# Patient Record
Sex: MALE | Race: WHITE | Employment: OTHER | URBAN - METROPOLITAN AREA
[De-identification: names, ages, dates, MRNs, and addresses within clinical notes are randomized per-mention and may not be internally consistent; named-entity substitution may affect disease eponyms.]

---

## 2017-01-27 ENCOUNTER — OFFICE VISIT (OUTPATIENT)
Dept: FAMILY MEDICINE | Facility: CLINIC | Age: 62
End: 2017-01-27
Payer: COMMERCIAL

## 2017-01-27 ENCOUNTER — TELEPHONE (OUTPATIENT)
Dept: LAB | Facility: CLINIC | Age: 62
End: 2017-01-27

## 2017-01-27 VITALS
HEART RATE: 75 BPM | DIASTOLIC BLOOD PRESSURE: 76 MMHG | SYSTOLIC BLOOD PRESSURE: 134 MMHG | HEIGHT: 72 IN | WEIGHT: 261 LBS | TEMPERATURE: 96.7 F | OXYGEN SATURATION: 96 % | BODY MASS INDEX: 35.35 KG/M2

## 2017-01-27 DIAGNOSIS — E78.5 HYPERLIPIDEMIA LDL GOAL <100: ICD-10-CM

## 2017-01-27 DIAGNOSIS — I10 HYPERTENSION GOAL BP (BLOOD PRESSURE) < 140/90: ICD-10-CM

## 2017-01-27 DIAGNOSIS — Z23 PNEUMOCOCCAL VACCINATION ADMINISTERED AT CURRENT VISIT: ICD-10-CM

## 2017-01-27 DIAGNOSIS — R11.0 NAUSEA: ICD-10-CM

## 2017-01-27 DIAGNOSIS — E11.42 CONTROLLED TYPE 2 DIABETES MELLITUS WITH DIABETIC POLYNEUROPATHY, WITHOUT LONG-TERM CURRENT USE OF INSULIN (H): Primary | ICD-10-CM

## 2017-01-27 LAB
ANION GAP SERPL CALCULATED.3IONS-SCNC: 8 MMOL/L (ref 3–14)
BUN SERPL-MCNC: 17 MG/DL (ref 7–30)
CALCIUM SERPL-MCNC: 9.1 MG/DL (ref 8.5–10.1)
CHLORIDE SERPL-SCNC: 104 MMOL/L (ref 94–109)
CO2 SERPL-SCNC: 25 MMOL/L (ref 20–32)
CREAT SERPL-MCNC: 0.82 MG/DL (ref 0.66–1.25)
GFR SERPL CREATININE-BSD FRML MDRD: ABNORMAL ML/MIN/1.7M2
GLUCOSE SERPL-MCNC: 163 MG/DL (ref 70–99)
HBA1C MFR BLD: 7.1 % (ref 4.3–6)
POTASSIUM SERPL-SCNC: 4.3 MMOL/L (ref 3.4–5.3)
SODIUM SERPL-SCNC: 137 MMOL/L (ref 133–144)

## 2017-01-27 PROCEDURE — 83036 HEMOGLOBIN GLYCOSYLATED A1C: CPT | Performed by: INTERNAL MEDICINE

## 2017-01-27 PROCEDURE — 99207 C FOOT EXAM  NO CHARGE: CPT | Performed by: INTERNAL MEDICINE

## 2017-01-27 PROCEDURE — 90670 PCV13 VACCINE IM: CPT | Performed by: INTERNAL MEDICINE

## 2017-01-27 PROCEDURE — 90471 IMMUNIZATION ADMIN: CPT | Performed by: INTERNAL MEDICINE

## 2017-01-27 PROCEDURE — 80048 BASIC METABOLIC PNL TOTAL CA: CPT | Performed by: INTERNAL MEDICINE

## 2017-01-27 PROCEDURE — 99214 OFFICE O/P EST MOD 30 MIN: CPT | Mod: 25 | Performed by: INTERNAL MEDICINE

## 2017-01-27 PROCEDURE — 36415 COLL VENOUS BLD VENIPUNCTURE: CPT | Performed by: INTERNAL MEDICINE

## 2017-01-27 RX ORDER — SIMVASTATIN 20 MG
20 TABLET ORAL DAILY
Qty: 90 TABLET | Refills: 3 | Status: SHIPPED | OUTPATIENT
Start: 2017-01-27 | End: 2017-02-06

## 2017-01-27 RX ORDER — GLIMEPIRIDE 2 MG/1
TABLET ORAL
Qty: 90 TABLET | Refills: 1 | Status: SHIPPED | OUTPATIENT
Start: 2017-01-27 | End: 2017-02-06

## 2017-01-27 RX ORDER — LISINOPRIL 5 MG/1
5 TABLET ORAL DAILY
Qty: 90 TABLET | Refills: 3 | Status: SHIPPED | OUTPATIENT
Start: 2017-01-27 | End: 2017-02-06
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
## 2017-01-27 NOTE — NURSING NOTE
Chief Complaint   Patient presents with     Diabetes       Initial /76 mmHg  Pulse 75  Temp(Src) 96.7  F (35.9  C) (Oral)  Ht 6' (1.829 m)  Wt 261 lb (118.389 kg)  BMI 35.39 kg/m2  SpO2 96% Estimated body mass index is 35.39 kg/(m^2) as calculated from the following:    Height as of this encounter: 6' (1.829 m).    Weight as of this encounter: 261 lb (118.389 kg).  BP completed using cuff size: cody Olson

## 2017-01-27 NOTE — Clinical Note
[facility] [address] [place] [address]    January 30, 2017    Florian Lawrence  [address] [facility] [id number]          Dear Florian,    These numbers are fine Mr. Lawrence, things look fine. Please let me know if you have any questions for me about all of these lab tests, take care.    Results for orders placed or performed in visit on 01/27/17   Hemoglobin A1c   Result Value Ref Range    Hemoglobin A1C 7.1 (H) 4.3 - 6.0 %   Basic metabolic panel   Result Value Ref Range    Sodium 137 133 - 144 mmol/L    Potassium 4.3 3.4 - 5.3 mmol/L    Chloride 104 94 - 109 mmol/L    Carbon Dioxide 25 20 - 32 mmol/L    Anion Gap 8 3 - 14 mmol/L    Glucose 163 (H) 70 - 99 mg/dL    Urea Nitrogen 17 7 - 30 mg/dL    Creatinine 0.82 0.66 - 1.25 mg/dL    GFR Estimate >90  Non  GFR Calc   >60 mL/min/1.7m2    GFR Estimate If Black >90   GFR Calc   >60 mL/min/1.7m2    Calcium 9.1 8.5 - 10.1 mg/dL       If you have any questions or concerns, please me or my clinic team at [phone].      Sincerely,        Cecilio Pro MD/bt

## 2017-01-27 NOTE — TELEPHONE ENCOUNTER
Dr Pro,     The patient did not leave a urine specimen. The Albumin Random Urine was put in as a future.   If the test is needed the patient will need to be contacted.    Thank you   Lab Staff

## 2017-01-27 NOTE — PATIENT INSTRUCTIONS
[facility]    If you have any questions regarding to your visit please contact your care team:     Team Pink:   Clinic Hours Telephone Number   Internal Medicine:  Dr. Melissa Honeycutt NP       7am-7pm  Monday - Thursday   7am-5pm  Fridays  [phone]  (Appointment scheduling available 24/7)    Questions about your visit?  Team Line  [phone]   Urgent Care - [place] and [facility] - 11am-9pm Monday-Friday Saturday-Sunday- 9am-5pm   [place] - 5pm-9pm Monday-Friday Saturday-Sunday- 9am-5pm  [phone] - [place]   [phone] - [place]       What options do I have for visits at the clinic other than the traditional office visit?  To expand how we care for you, many of our providers are utilizing electronic visits (e-visits) and telephone visits, when medically appropriate, for interactions with their patients rather than a visit in the clinic.   We also offer nurse visits for many medical concerns. Just like any other service, we will bill your insurance company for this type of visit based on time spent on the phone with your provider. Not all insurance companies cover these visits. Please check with your medical insurance if this type of visit is covered. You will be responsible for any charges that are not paid by your insurance.      E-visits via Vidyard:  generally incur a $35.00 fee.  Telephone visits:  Time spent on the phone: *charged based on time that is spent on the phone in increments of 10 minutes. Estimated cost:   5-10 mins $30.00   11-20 mins. $59.00   21-30 mins. $85.00   Use RoboteXt (secure email communication and access to your chart) to send your primary care provider a message or make an appointment. Ask someone on your Team how to sign up for Vidyard.    For a Price Quote for your services, please call our Consumer Price Line at [phone].    As always, Thank you for trusting us with your health care needs!  Nick  BENEDICT Olson

## 2017-01-27 NOTE — MR AVS SNAPSHOT
After Visit Summary   1/27/2017    Florian Lawrence    MRN: [id number]           Patient Information     Date Of Birth          1955        Visit Information        Provider Department      1/27/2017 8:50 AM Cecilio Pro MD [facility]        Today's Diagnoses     Controlled type 2 diabetes mellitus with diabetic polyneuropathy, without long-term current use of insulin (H)    -  1     Hyperlipidemia LDL goal <100         Hypertension goal BP (blood pressure) < 140/90         Nausea         Pneumococcal vaccination administered at current visit           Care Instructions    [facility]    If you have any questions regarding to your visit please contact your care team:     Team Pink:   Clinic Hours Telephone Number   Internal Medicine:  Dr. Melissa Honeycutt NP       7am-7pm  Monday - Thursday   7am-5pm  Fridays  [phone]  (Appointment scheduling available 24/7)    Questions about your visit?  Team Line  [phone]   Urgent Care - [place] - 11am-9pm Monday-Friday Saturday-Sunday- 9am-5pm   [place] - 5pm-9pm Monday-Friday Saturday-Sunday- 9am-5pm  [phone] - [place]   [phone] - [place]       What options do I have for visits at the clinic other than the traditional office visit?  To expand how we care for you, many of our providers are utilizing electronic visits (e-visits) and telephone visits, when medically appropriate, for interactions with their patients rather than a visit in the clinic.   We also offer nurse visits for many medical concerns. Just like any other service, we will bill your insurance company for this type of visit based on time spent on the phone with your provider. Not all insurance companies cover these visits. Please check with your medical insurance if this type of visit is covered. You will be responsible for any charges that are not paid by your insurance.       E-visits via [email or web address]:  generally incur a $35.00 fee.  Telephone visits:  Time spent on the phone: *charged based on time that is spent on the phone in increments of 10 minutes. Estimated cost:   5-10 mins $30.00   11-20 mins. $59.00   21-30 mins. $85.00   Use [email or web address] (secure email communication and access to your chart) to send your primary care provider a message or make an appointment. Ask someone on your Team how to sign up for PerspecSyst.    For a Price Quote for your services, please call our [employer] Line at [phone].    As always, Thank you for trusting us with your health care needs!  Nick MCMULLEN FLyestefanitad          Follow-ups after your visit        Who to contact     If you have questions or need follow up information about today's clinic visit or your schedule please contact [facility] directly at [phone].  Normal or non-critical lab and imaging results will be communicated to you by [email or web address], letter or phone within 4 business days after the clinic has received the results. If you do not hear from us within 7 days, please contact the clinic through PerspecSyst or phone. If you have a critical or abnormal lab result, we will notify you by phone as soon as possible.  Submit refill requests through BooRah or call your pharmacy and they will forward the refill request to us. Please allow 3 business days for your refill to be completed.          Additional Information About Your Visit        [email or web address] Information     BooRah gives you secure access to your electronic health record. If you see a primary care provider, you can also send messages to your care team and make appointments. If you have questions, please call your primary care clinic.  If you do not have a primary care provider, please call [phone] and they will assist you.        Care EveryWhere ID     This is your Care EveryWhere ID. This could be used by other organizations to access your [facility]  records  [id number]        Your Vitals Were     Pulse Temperature Height BMI (Body Mass Index) Pulse Oximetry       75 96.7  F (35.9  C) (Oral) 6' (1.829 m) 35.39 kg/m2 96%        Blood Pressure from Last 3 Encounters:   01/27/17 134/76   11/21/16 148/80   06/27/16 132/74    Weight from Last 3 Encounters:   01/27/17 261 lb (118.389 kg)   11/21/16 259 lb (117.482 kg)   06/27/16 259 lb (117.482 kg)              We Performed the Following     Albumin Random Urine Quantitative     Basic metabolic panel     FOOT EXAM     Hemoglobin A1c     PNEUMOCOCCAL CONJ VACCINE 13 VALENT IM (PREVNAR 13)          Today's Medication Changes          These changes are accurate as of: 1/27/17  9:51 AM.  If you have any questions, ask your nurse or doctor.               These medicines have changed or have updated prescriptions.        Dose/Directions    glimepiride 2 MG tablet   Commonly known as:  AMARYL   This may have changed:  See the new instructions.   Used for:  Controlled type 2 diabetes mellitus with diabetic polyneuropathy, without long-term current use of insulin (H)   Changed by:  Cecilio Pro MD        TAKE 1 TABLET EVERY MORNING BEFORE BREAKFAST   Quantity:  90 tablet   Refills:  1       insulin glargine 100 UNIT/ML injection   Commonly known as:  LANTUS SOLOSTAR   This may have changed:  additional instructions   Used for:  Controlled type 2 diabetes mellitus with diabetic polyneuropathy, without long-term current use of insulin (H)   Changed by:  Cecilio Pro MD        Inject 60 units at bedtime.   Quantity:  60 mL   Refills:  3       metFORMIN 1000 MG tablet   Commonly known as:  GLUCOPHAGE   This may have changed:  additional instructions   Used for:  Controlled type 2 diabetes mellitus with diabetic polyneuropathy, without long-term current use of insulin (H)   Changed by:  Cecilio Pro MD        Take 1 Tablet twice daily with meals.   Quantity:  180 tablet   Refills:  1            Where to get your medicines       These medications were sent to [email or web address] - [place], [place] - [address]  [address] [facility] [address]     Phone:  [phone]    - glimepiride 2 MG tablet  - insulin glargine 100 UNIT/ML injection  - insulin pen needle 31G X 6 MM  - lisinopril 5 MG tablet  - metFORMIN 1000 MG tablet  - simvastatin 20 MG tablet             Primary Care Provider Office Phone # Fax #    Cecilio Pro [phone]       [address] [address]        Thank you!     Thank you for choosing [facility]  for your care. Our goal is always to provide you with excellent care. Hearing back from our patients is one way we can continue to improve our services. Please take a few minutes to complete the written survey that you may receive in the mail after your visit with us. Thank you!             Your Updated Medication List - Protect others around you: Learn how to safely use, store and throw away your medicines at www.disposemymeds.org.          This list is accurate as of: 1/27/17  9:51 AM.  Always use your most recent med list.                   Brand Name Dispense Instructions for use    aspirin 325 MG EC tablet     100 tablet    Take 1 tablet by mouth daily.       blood glucose calibration NORMAL solution     1 Bottle    Use to check blood glucose monitor as needed as directed.       glimepiride 2 MG tablet    AMARYL    90 tablet    TAKE 1 TABLET EVERY MORNING BEFORE BREAKFAST       ibuprofen 200 MG tablet    ADVIL/MOTRIN     Take 200 mg by mouth every 4 hours as needed       insulin glargine 100 UNIT/ML injection    LANTUS SOLOSTAR    60 mL    Inject 60 units at bedtime.       insulin pen needle 31G X 6 MM    ULTICARE MINI    200 each    1 Box 3 times daily Use 3 daily or as directed.       lisinopril 5 MG tablet    PRINIVIL/ZESTRIL    90 tablet    Take 1 tablet (5 mg) by mouth daily       metFORMIN 1000 MG tablet     GLUCOPHAGE    180 tablet    Take 1 Tablet twice daily with meals.       MULTIPLE VITAMIN PO      Take 1 tablet by mouth 2 times daily.       ONE TOUCH ULTRA test strip   Generic drug:  blood glucose monitoring     300 each    USE TO TEST TWICE A DAY       order for DME     3 Box    Equipment being ordered: glucostrips for the  one touch ultra 2 glucometer 2 times a day       sildenafil 100 MG cap/tab    VIAGRA    6 tablet    Take 1 tablet (100 mg) by mouth daily as needed for erectile dysfunction Take 30 min to 4 hours before intercourse.  Never use with nitroglycerin, terazosin or doxazosin.       simvastatin 20 MG tablet    ZOCOR    90 tablet    Take 1 tablet (20 mg) by mouth daily       vitamin D 400 UNITS tablet      Take 400 Units by mouth 2 times daily.

## 2017-03-22 ENCOUNTER — MYC MEDICAL ADVICE (OUTPATIENT)
Dept: FAMILY MEDICINE | Facility: CLINIC | Age: 62
End: 2017-03-22

## 2017-03-22 DIAGNOSIS — L30.9 ECZEMA, UNSPECIFIED TYPE: Primary | ICD-10-CM

## 2017-03-23 PROBLEM — L30.9 ECZEMA, UNSPECIFIED TYPE: Status: ACTIVE | Noted: 2017-03-23

## 2017-03-23 RX ORDER — CLOBETASOL PROPIONATE 0.5 MG/G
CREAM TOPICAL
Qty: 45 G | Refills: 3 | Status: SHIPPED | OUTPATIENT
Start: 2017-03-23 | End: 2018-09-25

## 2017-03-23 NOTE — TELEPHONE ENCOUNTER
clobetasol (TEMOVATE) 0.05 % cream      Last Written Prescription Date:  ?  Last Fill Quantity: ?,   # refills: ?  Last Office Visit with [facility], [facility] or [facility] prescribing provider: 1-27-17  Future Office visit:       Routing refill request to provider for review/approval because:  Drug not active on patient's medication list
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
## 2017-06-09 ENCOUNTER — OFFICE VISIT (OUTPATIENT)
Dept: FAMILY MEDICINE | Facility: CLINIC | Age: 62
End: 2017-06-09
Payer: COMMERCIAL

## 2017-06-09 VITALS
BODY MASS INDEX: 35.53 KG/M2 | SYSTOLIC BLOOD PRESSURE: 136 MMHG | WEIGHT: 262 LBS | OXYGEN SATURATION: 96 % | HEART RATE: 72 BPM | DIASTOLIC BLOOD PRESSURE: 70 MMHG | TEMPERATURE: 97 F

## 2017-06-09 DIAGNOSIS — E11.42 CONTROLLED TYPE 2 DIABETES MELLITUS WITH DIABETIC POLYNEUROPATHY, WITHOUT LONG-TERM CURRENT USE OF INSULIN (H): Primary | ICD-10-CM

## 2017-06-09 DIAGNOSIS — K21.9 GASTROESOPHAGEAL REFLUX DISEASE, ESOPHAGITIS PRESENCE NOT SPECIFIED: ICD-10-CM

## 2017-06-09 DIAGNOSIS — Z13.29 SCREENING FOR THYROID DISORDER: ICD-10-CM

## 2017-06-09 DIAGNOSIS — L30.9 ECZEMA, UNSPECIFIED TYPE: ICD-10-CM

## 2017-06-09 DIAGNOSIS — E78.5 HYPERLIPIDEMIA LDL GOAL <100: ICD-10-CM

## 2017-06-09 DIAGNOSIS — I10 HYPERTENSION GOAL BP (BLOOD PRESSURE) < 140/90: ICD-10-CM

## 2017-06-09 LAB
CHOLEST SERPL-MCNC: 134 MG/DL
HBA1C MFR BLD: 6.6 % (ref 4.3–6)
HDLC SERPL-MCNC: 27 MG/DL
LDLC SERPL CALC-MCNC: ABNORMAL MG/DL
LDLC SERPL DIRECT ASSAY-MCNC: 64 MG/DL
NONHDLC SERPL-MCNC: 107 MG/DL
TRIGL SERPL-MCNC: 441 MG/DL
TSH SERPL DL<=0.005 MIU/L-ACNC: 2.52 MU/L (ref 0.4–4)

## 2017-06-09 PROCEDURE — 36415 COLL VENOUS BLD VENIPUNCTURE: CPT | Performed by: INTERNAL MEDICINE

## 2017-06-09 PROCEDURE — 84443 ASSAY THYROID STIM HORMONE: CPT | Performed by: INTERNAL MEDICINE

## 2017-06-09 PROCEDURE — 83036 HEMOGLOBIN GLYCOSYLATED A1C: CPT | Performed by: INTERNAL MEDICINE

## 2017-06-09 PROCEDURE — 99214 OFFICE O/P EST MOD 30 MIN: CPT | Performed by: INTERNAL MEDICINE

## 2017-06-09 PROCEDURE — 83721 ASSAY OF BLOOD LIPOPROTEIN: CPT | Mod: 59 | Performed by: INTERNAL MEDICINE

## 2017-06-09 PROCEDURE — 80061 LIPID PANEL: CPT | Performed by: INTERNAL MEDICINE

## 2017-06-09 ASSESSMENT — PAIN SCALES - GENERAL: PAINLEVEL: NO PAIN (0)

## 2017-06-09 NOTE — LETTER
[address] [place] [address]    June 13, 2017    Florian Lawrence  [facility] [facility] [id number]          Dear Florian,    All of these tests are within acceptable limits , see you in 9 months !     Results for orders placed or performed in visit on 06/09/17   Lipid panel reflex to direct LDL   Result Value Ref Range    Cholesterol 134 <200 mg/dL    Triglycerides 441 (H) <150 mg/dL    HDL Cholesterol 27 (L) >39 mg/dL    LDL Cholesterol Calculated  <100 mg/dL     Cannot estimate LDL when triglyceride exceeds 400 mg/dL    Non HDL Cholesterol 107 <130 mg/dL   TSH WITH FREE T4 REFLEX   Result Value Ref Range    TSH 2.52 0.40 - 4.00 mU/L   Hemoglobin A1c   Result Value Ref Range    Hemoglobin A1C 6.6 (H) 4.3 - 6.0 %   LDL cholesterol direct   Result Value Ref Range    LDL Cholesterol Direct 64 <100 mg/dL   If you have any questions or concerns, please me or my clinic team at [phone].      Sincerely,      Cecilio Pro MD/bt

## 2017-06-09 NOTE — NURSING NOTE
Chief Complaint   Patient presents with     Diabetes     follow-up       Initial /70  Pulse 72  Temp 97  F (36.1  C) (Oral)  Wt 262 lb (118.8 kg)  SpO2 96%  BMI 35.53 kg/m2 Estimated body mass index is 35.53 kg/(m^2) as calculated from the following:    Height as of 1/27/17: 6' (1.829 m).    Weight as of this encounter: 262 lb (118.8 kg).  Medication Reconciliation: complete   CLIFFORD/MA

## 2017-06-09 NOTE — PATIENT INSTRUCTIONS
"Follow up in 9 months or sooner if any concerns arise.    Honoring Choices - Your Rights: Making Your Own Health Care Treatment Decisions  [place] Law:  [place] law allows you to inform others of your health care wishes. You have the right to state your wishes or appoint an agent in writing so that others will know what you want if you can't tell them because of illness or injury. The information that follows tells about health care directives and how to prepare them. It does not give every detail of the law.  What is a health care directive?  A health care directive is a written document that informs others of your wishes about health care. It allows you to name a person (\"agent\") to decide for you if you are unable to decide. It also allows you to name an agent if you want someone else to decide for you while you still have capacity. You must be at least 18 years old to make a health care directive.  Why have a health care directive?  A health care directive is important if your attending physician determines you can't communicate your health care choices (because of physical or mental incapacity). It is also important if you wish to have someone else make your health care decisions. In some circumstances, your directive may state that you want someone other than an attending physician to decide when you cannot make your own decisions.  Must I have a health care directive? What happens if I don't have one?  You don't have to have a health care directive. But, writing one helps to make sure your wishes are followed. You will still receive medical treatment if you don't have a written directive. Health care providers will listen to what people close to you say about your treatment preferences, but the best way to be sure your wishes are followed is to have a health care directive.  How do I make a health care directive?  There are forms for health care directives. You don't have to use a form, but your health " care directive must meet the following requirements to be legal:    Be in writing, dated, and state your name.    Be signed by you or someone you authorize to sign for you when you can understand and communicate your health care wishes.    Have your signature verified by a  or two witnesses (notaries and witnesses cannot also be named as agent).    Include the appointment of an agent to make health care decisions for you and/or instructions about the health care choices you wish to make.  Before you prepare or revise your directive, you should discuss your health care wishes with your doctor or other health care provider. Information about where to get health care directive forms is given at the end of this document.  What can I put in a health care directive?  You have many choices of what to put in your health care directive. For example, you may include:    The person you trust as your agent to make health care decisions for you. You can name alternate agents, in case the first agent is unavailable, or joint agents.    Your goals, values, preferences, and cultural beliefs about health care.    The types of medical treatment you would want (or not want).    How you want your agent or agents to decide.    Where you want to receive care.    Instructions about artificial nutrition and hydration.    Mental health treatments that use electroshock therapy or neuroleptic medications.    Instructions if you are pregnant.    Donation of organs, tissues and eyes.     arrangements.    Who you would like as your guardian or conservator if there is a court action.  You may be as specific or as general as you wish. You can choose which issues or treatments to deal with in your health care directive.  Are there any limits to what I can put in my health care directive?  There are some limits about what you can put in your health care directive. For instance:    Your agent must be at least 18 years of  age.    Your agent cannot be your health care provider, unless the health care provider is a family member or you give reasons for the naming of the agent in your directive.    You cannot request health care treatment that is outside of reasonable medical practice.    You cannot request assisted suicide.  How long does a health care directive last? Can I change it?  Your health care directive lasts until you change or cancel it. As long as the changes meet the health care directive requirements listed above, you may cancel your directive by any of the following:    A written statement saying you want to cancel it    Destroying it    Telling at least two other people you want to cancel it    Writing a new health care directive.  What should I do with my health care directive after I have signed it?  You should inform others of your health care directive and give people copies of it. You may wish to inform family members, your health care agent or agents, and your health care providers that you have a health care directive. You should give them a copy. It's a good idea to review and update your directive as your needs change. Keep it in a safe place where it is easily found.   We are committed to making your health care wishes known. You may give a copy of your directive to any care team member or bring or mail a copy to any of our locations, and we will keep it in your medical record.  What if I've already prepared a health care document? Is it still good?  Before August 1, 1998, [place] law provided for several other types of directives, including living farr, durable health care wetzel of  and mental health declarations. The law changed so people can use one form for all their health care instructions. Forms created before August 1, 1998 are still legal if they followed the law in effect when written. They are also legal if they meet the requirements of the new law (described above). You may want to  review any existing documents to make sure they say what you want and meet all requirements.  I prepared my directive in another state. Is it still good?  Health care directives prepared in other states are legal if they meet the requirements of the other state's laws or the [place] requirements. But requests for assisted suicide will not be followed.  What if my health care provider refuses to follow my health care directive?  Your health care provider generally will follow your health care directive, or any instructions from your agent, as long as the health care follows reasonable medical practice. But, you or your agent cannot request treatment that will not help you or which the provider cannot provide. If the provider cannot follow the agent's directions about life-sustaining treatment, the provider must inform the agent. The provider must also document the notice in your medical record. The provider must allow the agency to arrange to transfer you to another provider who will follow the agent's directions.  What if I believe a health care provider has not followed health care directive requirements?  Complaints of this type can be filed with the Office of Health Facility Complaints at [phone] ([facility]) or toll free at [phone].  What if I believe a health plan has not followed health care directive requirements?  Complaints of this type can be filed with the [place] Health Information Clearinghouse at [phone] or toll free at [phone].  How to obtain more information  Ask any care team member for information, materials or how to register for a free class on advance care planning and creating a health care directive. Or, visit [email or web address].org/choices, or call [phone] or [phone].   Also: [place] Board on Aging's Senior LinkAge Line, [phone]. Another health care directive form is at: www.[place]aging.  For informational purposes only. Not to replace the advice of your  health care provider.  Copyright   2012 [facility]. All rights reserved. [employer] 1626 - REV 06/16.       [facility]    If you have any questions regarding to your visit please contact your care team:     Team Pink:   Clinic Hours Telephone Number   Internal Medicine:  Dr. Melissa Honeycutt, NP       7am-7pm  Monday - Thursday   7am-5pm  Fridays  [phone]  (Appointment scheduling available 24/7)    Questions about your visit?  Team Line  [phone]   Urgent Care - [place] - 11am-9pm Monday-Friday Saturday-Sunday- 9am-5pm   [place] - 5pm-9pm Monday-Friday Saturday-Sunday- 9am-5pm  [phone] - [place]   [phone] - [place]       What options do I have for visits at the clinic other than the traditional office visit?  To expand how we care for you, many of our providers are utilizing electronic visits (e-visits) and telephone visits, when medically appropriate, for interactions with their patients rather than a visit in the clinic.   We also offer nurse visits for many medical concerns. Just like any other service, we will bill your insurance company for this type of visit based on time spent on the phone with your provider. Not all insurance companies cover these visits. Please check with your medical insurance if this type of visit is covered. You will be responsible for any charges that are not paid by your insurance.      E-visits via Appinions:  generally incur a $35.00 fee.  Telephone visits:  Time spent on the phone: *charged based on time that is spent on the phone in increments of 10 minutes. Estimated cost:   5-10 mins $30.00   11-20 mins. $59.00   21-30 mins. $85.00   Use GLAMSQUADt (secure email communication and access to your chart) to send your primary care provider a message or make an appointment. Ask someone on your Team how to sign up for Appinions.    For a Price Quote for your services, please  call our Consumer Price Line at [phone].    As always, Thank you for trusting us with your health care needs!    Discharged by Daisy KIRKLAND CMA ([facility])

## 2017-06-09 NOTE — MR AVS SNAPSHOT
"              After Visit Summary   6/9/2017    Florian Lawrence    MRN: [id number]           Patient Information     Date Of Birth          1955        Visit Information        Provider Department      6/9/2017 8:50 AM Cecilio Pro MD [facility]        Today's Diagnoses     Controlled type 2 diabetes mellitus with diabetic polyneuropathy, without long-term current use of insulin (H)    -  1    Hyperlipidemia LDL goal <100        Hypertension goal BP (blood pressure) < 140/90        Gastroesophageal reflux disease, esophagitis presence not specified        Eczema, unspecified type          Care Instructions    Follow up in 9 months or sooner if any concerns arise.    Honoring Choices - Your Rights: Making Your Own Health Care Treatment Decisions  [place] Law:  [place] law allows you to inform others of your health care wishes. You have the right to state your wishes or appoint an agent in writing so that others will know what you want if you can't tell them because of illness or injury. The information that follows tells about health care directives and how to prepare them. It does not give every detail of the law.  What is a health care directive?  A health care directive is a written document that informs others of your wishes about health care. It allows you to name a person (\"agent\") to decide for you if you are unable to decide. It also allows you to name an agent if you want someone else to decide for you while you still have capacity. You must be at least 18 years old to make a health care directive.  Why have a health care directive?  A health care directive is important if your attending physician determines you can't communicate your health care choices (because of physical or mental incapacity). It is also important if you wish to have someone else make your health care decisions. In some circumstances, your directive may state that you want someone other than an attending " physician to decide when you cannot make your own decisions.  Must I have a health care directive? What happens if I don't have one?  You don't have to have a health care directive. But, writing one helps to make sure your wishes are followed. You will still receive medical treatment if you don't have a written directive. Health care providers will listen to what people close to you say about your treatment preferences, but the best way to be sure your wishes are followed is to have a health care directive.  How do I make a health care directive?  There are forms for health care directives. You don't have to use a form, but your health care directive must meet the following requirements to be legal:    Be in writing, dated, and state your name.    Be signed by you or someone you authorize to sign for you when you can understand and communicate your health care wishes.    Have your signature verified by a  or two witnesses (notaries and witnesses cannot also be named as agent).    Include the appointment of an agent to make health care decisions for you and/or instructions about the health care choices you wish to make.  Before you prepare or revise your directive, you should discuss your health care wishes with your doctor or other health care provider. Information about where to get health care directive forms is given at the end of this document.  What can I put in a health care directive?  You have many choices of what to put in your health care directive. For example, you may include:    The person you trust as your agent to make health care decisions for you. You can name alternate agents, in case the first agent is unavailable, or joint agents.    Your goals, values, preferences, and cultural beliefs about health care.    The types of medical treatment you would want (or not want).    How you want your agent or agents to decide.    Where you want to receive care.    Instructions about artificial  nutrition and hydration.    Mental health treatments that use electroshock therapy or neuroleptic medications.    Instructions if you are pregnant.    Donation of organs, tissues and eyes.     arrangements.    Who you would like as your guardian or conservator if there is a court action.  You may be as specific or as general as you wish. You can choose which issues or treatments to deal with in your health care directive.  Are there any limits to what I can put in my health care directive?  There are some limits about what you can put in your health care directive. For instance:    Your agent must be at least 18 years of age.    Your agent cannot be your health care provider, unless the health care provider is a family member or you give reasons for the naming of the agent in your directive.    You cannot request health care treatment that is outside of reasonable medical practice.    You cannot request assisted suicide.  How long does a health care directive last? Can I change it?  Your health care directive lasts until you change or cancel it. As long as the changes meet the health care directive requirements listed above, you may cancel your directive by any of the following:    A written statement saying you want to cancel it    Destroying it    Telling at least two other people you want to cancel it    Writing a new health care directive.  What should I do with my health care directive after I have signed it?  You should inform others of your health care directive and give people copies of it. You may wish to inform family members, your health care agent or agents, and your health care providers that you have a health care directive. You should give them a copy. It's a good idea to review and update your directive as your needs change. Keep it in a safe place where it is easily found.   We are committed to making your health care wishes known. You may give a copy of your directive to any care team member  or bring or mail a copy to any of our locations, and we will keep it in your medical record.  What if I've already prepared a health care document? Is it still good?  Before August 1, 1998, [place] law provided for several other types of directives, including living farr, durable health care wetzel of  and mental health declarations. The law changed so people can use one form for all their health care instructions. Forms created before August 1, 1998 are still legal if they followed the law in effect when written. They are also legal if they meet the requirements of the new law (described above). You may want to review any existing documents to make sure they say what you want and meet all requirements.  I prepared my directive in another state. Is it still good?  Health care directives prepared in other states are legal if they meet the requirements of the other state's laws or the [place] requirements. But requests for assisted suicide will not be followed.  What if my health care provider refuses to follow my health care directive?  Your health care provider generally will follow your health care directive, or any instructions from your agent, as long as the health care follows reasonable medical practice. But, you or your agent cannot request treatment that will not help you or which the provider cannot provide. If the provider cannot follow the agent's directions about life-sustaining treatment, the provider must inform the agent. The provider must also document the notice in your medical record. The provider must allow the agency to arrange to transfer you to another provider who will follow the agent's directions.  What if I believe a health care provider has not followed health care directive requirements?  Complaints of this type can be filed with the Office of Health Facility Complaints at [phone] ([facility]) or toll free at [phone].  What if I believe a health plan has not  followed health care directive requirements?  Complaints of this type can be filed with the [place] Health Information Clearinghouse at [phone] or toll free at [phone].  How to obtain more information  Ask any care team member for information, materials or how to register for a free class on advance care planning and creating a health care directive. Or, visit [email or web address], or call [phone] or [phone].   Also: [place] Board on Aging's Senior LinkAge Line, [phone]. Another health care directive form is at: www.mnaging.  For informational purposes only. Not to replace the advice of your health care provider.  Copyright   2012 [employer]. All rights reserved. [employer] 1626 - REV 06/16.       [facility]    If you have any questions regarding to your visit please contact your care team:     Team Pink:   Clinic Hours Telephone Number   Internal Medicine:  Dr. Melissa Honeycutt NP       7am-7pm  Monday - Thursday   7am-5pm  Fridays  [phone]  (Appointment scheduling available 24/7)    Questions about your visit?  Team Line  [phone]   Urgent Care - [facility] [facility] - 11am-9pm Monday-Friday Saturday-Sunday- 9am-5pm   [place] - 5pm-9pm Monday-Friday Saturday-Sunday- 9am-5pm  [phone] - Mae   [phone] - Luke       What options do I have for visits at the clinic other than the traditional office visit?  To expand how we care for you, many of our providers are utilizing electronic visits (e-visits) and telephone visits, when medically appropriate, for interactions with their patients rather than a visit in the clinic.   We also offer nurse visits for many medical concerns. Just like any other service, we will bill your insurance company for this type of visit based on time spent on the phone with your provider. Not all insurance companies cover these visits. Please check  with your medical insurance if this type of visit is covered. You will be responsible for any charges that are not paid by your insurance.      E-visits via [employer]:  generally incur a $35.00 fee.  Telephone visits:  Time spent on the phone: *charged based on time that is spent on the phone in increments of 10 minutes. Estimated cost:   5-10 mins $30.00   11-20 mins. $59.00   21-30 mins. $85.00   Use [employer] (secure email communication and access to your chart) to send your primary care provider a message or make an appointment. Ask someone on your Team how to sign up for [employer].    For a Price Quote for your services, please call our [employer] Line at [phone].    As always, Thank you for trusting us with your health care needs!    Discharged by Daisy KIRKLAND CMA ([facility])            Follow-ups after your visit        Who to contact     If you have questions or need follow up information about today's clinic visit or your schedule please contact [facility] directly at [phone].  Normal or non-critical lab and imaging results will be communicated to you by [employer], letter or phone within 4 business days after the clinic has received the results. If you do not hear from us within 7 days, please contact the clinic through [employer] or phone. If you have a critical or abnormal lab result, we will notify you by phone as soon as possible.  Submit refill requests through [employer] or call your pharmacy and they will forward the refill request to us. Please allow 3 business days for your refill to be completed.          Additional Information About Your Visit        [employer] [facility] Information     [employer] gives you secure access to your electronic health record. If you see a primary care provider, you can also send messages to your care team and make appointments. If you have questions, please call your primary care clinic.  If you do not have a primary care provider, please call [phone] and they will assist  you.        Care EveryWhere ID     This is your Care EveryWhere ID. This could be used by other organizations to access your [facility] medical records  [id number]        Your Vitals Were     Pulse Temperature Pulse Oximetry BMI (Body Mass Index)          72 97  F (36.1  C) (Oral) 96% 35.53 kg/m2         Blood Pressure from Last 3 Encounters:   06/09/17 136/70   01/27/17 134/76   11/21/16 148/80    Weight from Last 3 Encounters:   06/09/17 262 lb (118.8 kg)   01/27/17 261 lb (118.4 kg)   11/21/16 259 lb (117.5 kg)              We Performed the Following     Albumin Random Urine Quantitative     Hemoglobin A1c     Lipid panel reflex to direct LDL     TSH WITH FREE T4 REFLEX          Today's Medication Changes          These changes are accurate as of: 6/9/17  9:19 AM.  If you have any questions, ask your nurse or doctor.               These medicines have changed or have updated prescriptions.        Dose/Directions    blood glucose monitoring test strip   Commonly known as:  ONE TOUCH ULTRA   This may have changed:  See the new instructions.   Used for:  Controlled type 2 diabetes mellitus with diabetic polyneuropathy, without long-term current use of insulin (H)   Changed by:  Cecilio Pro MD        Dose:  1 strip   1 strip by In Vitro route 2 times daily Use to test blood sugar 2 times daily or as directed.   Quantity:  300 each   Refills:  0            Where to get your medicines      These medications were sent to [id number] - [facility], [address] [place] [address]  [address] [facility] [address], [facility] [address]     Phone:  [phone]     blood glucose monitoring test strip    metFORMIN 1000 MG tablet                Primary Care Provider Office Phone # Fax #    Cecilio Pro [phone]       [address] [address]        Thank you!     Thank you for choosing [facility]  for your care. Our goal is  always to provide you with excellent care. Hearing back from our patients is one way we can continue to improve our services. Please take a few minutes to complete the written survey that you may receive in the mail after your visit with us. Thank you!             Your Updated Medication List - Protect others around you: Learn how to safely use, store and throw away your medicines at www.disposemymeds.org.          This list is accurate as of: 6/9/17  9:19 AM.  Always use your most recent med list.                   Brand Name Dispense Instructions for use    aspirin 325 MG EC tablet     100 tablet    Take 1 tablet by mouth daily.       blood glucose calibration NORMAL solution     1 Bottle    Use to check blood glucose monitor as needed as directed.       blood glucose monitoring test strip    ONE TOUCH ULTRA    300 each    1 strip by In Vitro route 2 times daily Use to test blood sugar 2 times daily or as directed.       clobetasol 0.05 % cream    TEMOVATE    45 g    Apply two times a day to affected areas until cleared       glimepiride 2 MG tablet    AMARYL    90 tablet    TAKE 1 TABLET EVERY MORNING BEFORE BREAKFAST       ibuprofen 200 MG tablet    ADVIL/MOTRIN     Take 200 mg by mouth every 4 hours as needed       insulin glargine 100 UNIT/ML injection    LANTUS SOLOSTAR    60 mL    Inject 60 units at bedtime.       insulin pen needle 31G X 6 MM    ULTICARE MINI    100 each    Use 1 per day or as directed       lisinopril 5 MG tablet    PRINIVIL/ZESTRIL    90 tablet    Take 1 tablet (5 mg) by mouth daily       metFORMIN 1000 MG tablet    GLUCOPHAGE    180 tablet    Take 1 Tablet twice daily with meals.       MULTIPLE VITAMIN PO      Take 1 tablet by mouth 2 times daily.       order for DME     3 Box    Equipment being ordered: glucostrips for the  one touch ultra 2 glucometer 2 times a day       sildenafil 100 MG cap/tab    VIAGRA    6 tablet    Take 1 tablet (100 mg) by mouth daily as needed for erectile  dysfunction Take 30 min to 4 hours before intercourse.  Never use with nitroglycerin, terazosin or doxazosin.       simvastatin 20 MG tablet    ZOCOR    90 tablet    Take 1 tablet (20 mg) by mouth daily       vitamin D 400 UNITS tablet      Take 400 Units by mouth 2 times daily.

## 2017-06-23 DIAGNOSIS — E11.42 CONTROLLED TYPE 2 DIABETES MELLITUS WITH DIABETIC POLYNEUROPATHY, WITHOUT LONG-TERM CURRENT USE OF INSULIN (H): ICD-10-CM

## 2017-06-23 RX ORDER — GLIMEPIRIDE 2 MG/1
TABLET ORAL
Qty: 90 TABLET | Refills: 1 | Status: SHIPPED | OUTPATIENT
Start: 2017-06-23 | End: 2018-01-31

## 2017-06-23 NOTE — TELEPHONE ENCOUNTER
glimepiride (AMARYL) 2 MG tablet         Last Written Prescription Date: 2/6/2017  Last Fill Quantity: 90, # refills: 1  Last Office Visit with [facility], [facility] or [facility] prescribing provider:  6/9/2017        BP Readings from Last 3 Encounters:   06/09/17 136/70   01/27/17 134/76   11/21/16 148/80     Lab Results   Component Value Date    MICROL 9 12/04/2015     Lab Results   Component Value Date    UMALCR 6.84 12/04/2015     Creatinine   Date Value Ref Range Status   01/27/2017 0.82 0.66 - 1.25 mg/dL Final   ]  GFR Estimate   Date Value Ref Range Status   01/27/2017 >90  Non  GFR Calc   >60 mL/min/1.7m2 Final   06/27/2016 >90  Non  GFR Calc   >60 mL/min/1.7m2 Final   12/04/2015 >90  Non  GFR Calc   >60 mL/min/1.7m2 Final     GFR Estimate If Black   Date Value Ref Range Status   01/27/2017 >90   GFR Calc   >60 mL/min/1.7m2 Final   06/27/2016 >90   GFR Calc   >60 mL/min/1.7m2 Final   12/04/2015 >90   GFR Calc   >60 mL/min/1.7m2 Final     Lab Results   Component Value Date    CHOL 134 06/09/2017     Lab Results   Component Value Date    HDL 27 06/09/2017     Lab Results   Component Value Date    LDL  06/09/2017     Cannot estimate LDL when triglyceride exceeds 400 mg/dL    LDL 64 06/09/2017     Lab Results   Component Value Date    TRIG 441 06/09/2017     Lab Results   Component Value Date    CHOLHDLRATIO 4.6 06/24/2015     Lab Results   Component Value Date    AST 31 06/24/2015     Lab Results   Component Value Date    ALT 39 06/27/2016     Lab Results   Component Value Date    A1C 6.6 06/09/2017    A1C 7.1 01/27/2017    A1C 6.8 06/27/2016    A1C 6.2 12/04/2015    A1C 6.0 06/19/2015     Potassium   Date Value Ref Range Status   01/27/2017 4.3 3.4 - 5.3 mmol/L Final

## 2017-06-23 NOTE — TELEPHONE ENCOUNTER
Prescription approved per [facility] Refill Protocol.  Scarlett Braun, RN - BC

## 2017-10-19 ENCOUNTER — TELEPHONE (OUTPATIENT)
Dept: INTERNAL MEDICINE | Facility: CLINIC | Age: 62
End: 2017-10-19

## 2017-10-19 NOTE — TELEPHONE ENCOUNTER
Panel Management Review      Patient has the following on his problem list: None      Composite cancer screening  Chart review shows that this patient is due/due soon for the following Colonoscopy  Summary:    Patient is due/failing the following:   COLONOSCOPY    Action needed:   Patient needs office visit for Colonoscopy.    Type of outreach:    Sent letter.    Questions for provider review:    None                                                                                                                                    Caimla Sanchez CMA       Chart routed to  .

## 2017-10-19 NOTE — LETTER
[address] [address] [phone]        October 19, 2017          Florian Lawrence,  [facility] [facility] [address]        Dear Florian Lawrence      Monitoring and managing your preventative and chronic health conditions are very important to us. Our records indicate that you have not scheduled for a Colonoscopy  which was recommended by Dr. Pro for Colon Cancer screening.      If you have received your health care elsewhere, please call the clinic so the information can be documented in your chart.    Please call [phone] or message us through your [employer] account to schedule an appointment or provide information for your chart.     Feel free to contact us if you have any questions or concerns!    I look forward to seeing you and working with you on your health care needs.     Sincerely,         Cecilio Pro / Camila Sanchez, CMA

## 2017-10-29 DIAGNOSIS — E11.42 CONTROLLED TYPE 2 DIABETES MELLITUS WITH DIABETIC POLYNEUROPATHY, WITHOUT LONG-TERM CURRENT USE OF INSULIN (H): ICD-10-CM

## 2017-10-30 ENCOUNTER — TELEPHONE (OUTPATIENT)
Dept: INTERNAL MEDICINE | Facility: CLINIC | Age: 62
End: 2017-10-30

## 2017-10-30 DIAGNOSIS — Z87.19 HISTORY OF DIVERTICULOSIS: Primary | ICD-10-CM

## 2017-10-30 NOTE — TELEPHONE ENCOUNTER
Patient would like to have a referral put in for a Colonoscopy please call patient when order is put in with number.  Chani Nava,

## 2017-11-01 NOTE — TELEPHONE ENCOUNTER
Routing refill request to provider for review/approval because:  Labs not current:  Microalbumin      Scarlett Braun RN - BC

## 2017-11-13 ENCOUNTER — TRANSFERRED RECORDS (OUTPATIENT)
Dept: HEALTH INFORMATION MANAGEMENT | Facility: CLINIC | Age: 62
End: 2017-11-13

## 2017-12-08 ENCOUNTER — HOSPITAL ENCOUNTER (OUTPATIENT)
Facility: AMBULATORY SURGERY CENTER | Age: 62
Discharge: HOME | End: 2017-12-08
Attending: SPECIALIST | Admitting: SPECIALIST
Payer: COMMERCIAL

## 2017-12-08 ENCOUNTER — SURGERY (OUTPATIENT)
Age: 62
End: 2017-12-08

## 2017-12-08 VITALS
OXYGEN SATURATION: 94 % | RESPIRATION RATE: 16 BRPM | TEMPERATURE: 98 F | DIASTOLIC BLOOD PRESSURE: 73 MMHG | SYSTOLIC BLOOD PRESSURE: 111 MMHG

## 2017-12-08 LAB
COLONOSCOPY: NORMAL
GLUCOSE BLDC GLUCOMTR-MCNC: 180 MG/DL (ref 70–99)

## 2017-12-08 PROCEDURE — G8918 PT W/O PREOP ORDER IV AB PRO: HCPCS

## 2017-12-08 PROCEDURE — 45381 COLONOSCOPY SUBMUCOUS NJX: CPT

## 2017-12-08 PROCEDURE — 45385 COLONOSCOPY W/LESION REMOVAL: CPT

## 2017-12-08 PROCEDURE — 88305 TISSUE EXAM BY PATHOLOGIST: CPT | Performed by: SPECIALIST

## 2017-12-08 PROCEDURE — G8907 PT DOC NO EVENTS ON DISCHARG: HCPCS

## 2017-12-08 RX ORDER — LIDOCAINE 40 MG/G
CREAM TOPICAL
Status: DISCONTINUED | OUTPATIENT
Start: 2017-12-08 | End: 2017-12-09 | Stop reason: HOSPADM

## 2017-12-08 RX ORDER — FENTANYL CITRATE 50 UG/ML
INJECTION, SOLUTION INTRAMUSCULAR; INTRAVENOUS PRN
Status: DISCONTINUED | OUTPATIENT
Start: 2017-12-08 | End: 2017-12-08 | Stop reason: HOSPADM

## 2017-12-08 RX ORDER — ONDANSETRON 2 MG/ML
4 INJECTION INTRAMUSCULAR; INTRAVENOUS
Status: DISCONTINUED | OUTPATIENT
Start: 2017-12-08 | End: 2017-12-09 | Stop reason: HOSPADM

## 2017-12-08 RX ADMIN — FENTANYL CITRATE 50 MCG: 50 INJECTION, SOLUTION INTRAMUSCULAR; INTRAVENOUS at 09:06

## 2017-12-08 RX ADMIN — FENTANYL CITRATE 50 MCG: 50 INJECTION, SOLUTION INTRAMUSCULAR; INTRAVENOUS at 09:09

## 2017-12-08 NOTE — BRIEF OP NOTE
[facility] Brief Operative Note    Pre-operative diagnosis: History of diverticulosis, Colonoscopy, Dr Pro referring, BMI 34.9, [facility]; [phone]   Post-operative diagnosis 2 polyps, diverticulosis     Procedure: Procedure(s):  History of diverticulosis, Colonoscopy, Dr Pro referring, BMI 34.9, [facility]; [phone] - Wound Class: II-Clean Contaminated  saline lift and hot snare - Wound Class: II-Clean Contaminated   Surgeon(s): Surgeon(s) and Role:     * Gabriel Matias MD - Primary   Estimated blood loss: * No values recorded between 12/8/2017  9:04 AM and 12/8/2017  9:38 AM *    Specimens:   ID Type Source Tests Collected by Time Destination   A : with saline lift and hot snare Polyp Large Intestine, Cecum SURGICAL PATHOLOGY EXAM Mary Montez RN 12/8/2017  9:19 AM    B : with hot snare Polyp Large Intestine, Sigmoid SURGICAL PATHOLOGY EXAM Mary Montez RN 12/8/2017  9:31 AM       Findings: Please see ProVation procedure note in Chart Review
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
## 2017-12-12 LAB — COPATH REPORT: NORMAL

## 2018-01-31 DIAGNOSIS — E11.42 CONTROLLED TYPE 2 DIABETES MELLITUS WITH DIABETIC POLYNEUROPATHY, WITHOUT LONG-TERM CURRENT USE OF INSULIN (H): ICD-10-CM

## 2018-02-01 RX ORDER — GLIMEPIRIDE 2 MG/1
TABLET ORAL
Qty: 90 TABLET | Refills: 0 | Status: SHIPPED | OUTPATIENT
Start: 2018-02-01 | End: 2018-02-12

## 2018-02-01 NOTE — TELEPHONE ENCOUNTER
"Routing refill request to provider for review/approval because:  Failed [facility] refill protocol, see below:        Requested Prescriptions   Pending Prescriptions Disp Refills     glimepiride (AMARYL) 2 MG tablet [Pharmacy Med Name: GLIMEPIRIDE  2MG  TAB] 90 tablet      Sig: TAKE 1 TABLET BY MOUTH  EVERY MORNING BEFORE  BREAKFAST    Sulfonylurea Agents Failed    2/1/2018  8:41 AM       Failed - Patient has had a Microalbumin in the past 12 mos.    Recent Labs   Lab Test  12/04/15   0947   MICROL  9   UMALCR  6.84            Failed - Patient has documented A1c within the specified period of time.    Recent Labs   Lab Test  06/09/17   0849   A1C  6.6*            Failed - Patient has a recent creatinine (normal) within the past 12 mos.    Recent Labs   Lab Test  01/27/17   0904   CR  0.82            Failed - Patient has had an appointment with authorizing provider within the past 6 mos. or  within next 30 days    Patient had office visit in the last 6 months or has a visit in the next 30 days with authorizing provider.  See \"Patient Info\" tab in inbasket, or \"Choose Columns\" in Meds & Orders section of the refill encounter.           Passed - Patient's BP is less than 140/90    BP Readings from Last 3 Encounters:   12/08/17 111/73   06/09/17 136/70   01/27/17 134/76                Passed - Patient has documented LDL within the past 12 mos.    Recent Labs   Lab Test  06/09/17   0849   LDL  Cannot estimate LDL when triglyceride exceeds 400 mg/dL  64            Passed - Patient is age 18 or older        Scarlett Braun RN - BC      "

## 2018-02-08 DIAGNOSIS — E11.42 CONTROLLED TYPE 2 DIABETES MELLITUS WITH DIABETIC POLYNEUROPATHY, WITHOUT LONG-TERM CURRENT USE OF INSULIN (H): Primary | ICD-10-CM

## 2018-02-09 NOTE — TELEPHONE ENCOUNTER
Please help see to it that appointment is generated , please make appointment within 30 days     Cecilio Pro MD

## 2018-02-09 NOTE — TELEPHONE ENCOUNTER
Spoke to patient in regards to message below. Patient states understanding and scheduled him to see Dr. Pro on 2/12/18.      Camila Sanchez CMA

## 2018-02-09 NOTE — TELEPHONE ENCOUNTER
"Routing refill request to provider for review/approval because:  Failed [facility] refill protocol, see below:      Requested Prescriptions   Pending Prescriptions Disp Refills     metFORMIN (GLUCOPHAGE) 1000 MG tablet [Pharmacy Med Name: MetFORMIN 1000MG TABLET] 180 tablet      Sig: TAKE 1 TABLET BY MOUTH  TWICE A DAY WITH MEALS    Biguanide Agents Failed    2/9/2018  7:02 AM       Failed - Patient has had a Microalbumin in the past 12 mos.    Recent Labs   Lab Test  12/04/15   0947   MICROL  9   UMALCR  6.84            Failed - Patient has documented A1c within the specified period of time.    Recent Labs   Lab Test  06/09/17   0849   A1C  6.6*            Failed - Recent (6 mos) or future visit with authorizing provider's specialty    Patient had office visit in the last 6 months or has a visit in the next 30 days with authorizing provider.  See \"Patient Info\" tab in inbasket, or \"Choose Columns\" in Meds & Orders section of the refill encounter.           Passed - Patient's BP is less than 140/90    BP Readings from Last 3 Encounters:   12/08/17 111/73   06/09/17 136/70   01/27/17 134/76                Passed - Patient has documented LDL within the past 12 mos.    Recent Labs   Lab Test  06/09/17   0849   LDL  Cannot estimate LDL when triglyceride exceeds 400 mg/dL  64            Passed - Patient is age 10 or older       Passed - Patient's CR is NOT>1.4 OR Patient's EGFR is NOT<45 within past 12 mos.    Recent Labs   Lab Test  01/27/17   0904   GFRESTIMATED  >90  Non  GFR Calc     GFRESTBLACK  >90   GFR Calc         Recent Labs   Lab Test  01/27/17   0904   CR  0.82            Passed - Patient does NOT have a diagnosis of CHF.        Scarlett Braun RN - BC      "

## 2018-02-12 ENCOUNTER — OFFICE VISIT (OUTPATIENT)
Dept: INTERNAL MEDICINE | Facility: CLINIC | Age: 63
End: 2018-02-12
Payer: COMMERCIAL

## 2018-02-12 VITALS
DIASTOLIC BLOOD PRESSURE: 82 MMHG | HEART RATE: 92 BPM | HEIGHT: 72 IN | SYSTOLIC BLOOD PRESSURE: 134 MMHG | RESPIRATION RATE: 16 BRPM | OXYGEN SATURATION: 97 % | TEMPERATURE: 98.7 F | WEIGHT: 255 LBS | BODY MASS INDEX: 34.54 KG/M2

## 2018-02-12 DIAGNOSIS — J11.1 INFLUENZA-LIKE ILLNESS: ICD-10-CM

## 2018-02-12 DIAGNOSIS — N52.8 OTHER MALE ERECTILE DYSFUNCTION: ICD-10-CM

## 2018-02-12 DIAGNOSIS — E11.42 CONTROLLED TYPE 2 DIABETES MELLITUS WITH DIABETIC POLYNEUROPATHY, WITHOUT LONG-TERM CURRENT USE OF INSULIN (H): Primary | ICD-10-CM

## 2018-02-12 DIAGNOSIS — I10 HYPERTENSION GOAL BP (BLOOD PRESSURE) < 140/90: ICD-10-CM

## 2018-02-12 DIAGNOSIS — E78.5 HYPERLIPIDEMIA LDL GOAL <100: ICD-10-CM

## 2018-02-12 LAB
DEPRECATED S PYO AG THROAT QL EIA: NORMAL
FLUAV+FLUBV AG SPEC QL: NEGATIVE
FLUAV+FLUBV AG SPEC QL: NEGATIVE
HBA1C MFR BLD: 6.9 % (ref 4.3–6)
SPECIMEN SOURCE: NORMAL
SPECIMEN SOURCE: NORMAL

## 2018-02-12 PROCEDURE — 82043 UR ALBUMIN QUANTITATIVE: CPT | Performed by: INTERNAL MEDICINE

## 2018-02-12 PROCEDURE — 36415 COLL VENOUS BLD VENIPUNCTURE: CPT | Performed by: INTERNAL MEDICINE

## 2018-02-12 PROCEDURE — 87081 CULTURE SCREEN ONLY: CPT | Performed by: INTERNAL MEDICINE

## 2018-02-12 PROCEDURE — 83036 HEMOGLOBIN GLYCOSYLATED A1C: CPT | Performed by: INTERNAL MEDICINE

## 2018-02-12 PROCEDURE — 99214 OFFICE O/P EST MOD 30 MIN: CPT | Performed by: INTERNAL MEDICINE

## 2018-02-12 PROCEDURE — 80048 BASIC METABOLIC PNL TOTAL CA: CPT | Performed by: INTERNAL MEDICINE

## 2018-02-12 PROCEDURE — 87804 INFLUENZA ASSAY W/OPTIC: CPT | Performed by: INTERNAL MEDICINE

## 2018-02-12 PROCEDURE — 87880 STREP A ASSAY W/OPTIC: CPT | Performed by: INTERNAL MEDICINE

## 2018-02-12 RX ORDER — SIMVASTATIN 20 MG
20 TABLET ORAL DAILY
Qty: 90 TABLET | Refills: 3 | Status: SHIPPED | OUTPATIENT
Start: 2018-02-12 | End: 2018-09-25

## 2018-02-12 RX ORDER — GLIMEPIRIDE 2 MG/1
TABLET ORAL
Qty: 90 TABLET | Refills: 1 | Status: SHIPPED | OUTPATIENT
Start: 2018-02-12 | End: 2018-04-01

## 2018-02-12 RX ORDER — LISINOPRIL 5 MG/1
5 TABLET ORAL DAILY
Qty: 90 TABLET | Refills: 3 | Status: SHIPPED | OUTPATIENT
Start: 2018-02-12 | End: 2018-09-25

## 2018-02-12 RX ORDER — SILDENAFIL 100 MG/1
100 TABLET, FILM COATED ORAL DAILY PRN
Qty: 6 TABLET | Refills: 1 | Status: SHIPPED | OUTPATIENT
Start: 2018-02-12 | End: 2018-09-25

## 2018-02-12 NOTE — PATIENT INSTRUCTIONS
[facility]    If you have any questions regarding to your visit please contact your care team:     Team Pink:   Clinic Hours Telephone Number   Internal Medicine:  Dr. Melissa Honeycutt NP       7am-7pm  Monday - Thursday   7am-5pm  Fridays  [phone]  (Appointment scheduling available 24/7)    Questions about your visit?  Team Line  [phone]   Urgent Care - [place] and [facility] - 11am-9pm Monday-Friday Saturday-Sunday- 9am-5pm   [place] - 5pm-9pm Monday-Friday Saturday-Sunday- 9am-5pm  [phone] - [place]   [phone] - [place]       What options do I have for visits at the clinic other than the traditional office visit?  To expand how we care for you, many of our providers are utilizing electronic visits (e-visits) and telephone visits, when medically appropriate, for interactions with their patients rather than a visit in the clinic.   We also offer nurse visits for many medical concerns. Just like any other service, we will bill your insurance company for this type of visit based on time spent on the phone with your provider. Not all insurance companies cover these visits. Please check with your medical insurance if this type of visit is covered. You will be responsible for any charges that are not paid by your insurance.      E-visits via [employer]:  generally incur a $35.00 fee.  Telephone visits:  Time spent on the phone: *charged based on time that is spent on the phone in increments of 10 minutes. Estimated cost:   5-10 mins $30.00   11-20 mins. $59.00   21-30 mins. $85.00   Use [employer] (secure email communication and access to your chart) to send your primary care provider a message or make an appointment. Ask someone on your Team how to sign up for [employer].    For a Price Quote for your services, please call our Consumer Price Line at [phone].    As always, Thank you for trusting us with your health care  needs!    Daisy KIRKLAND CMA ([facility])

## 2018-02-12 NOTE — LETTER
[address] [place] [address]    February 16, 2018    Florian Lawrence  [facility] [facility] [address]          Dear Ira Du is a copy of your results. Negative rapid strep test and negative Influenza A test. Otherwise, all of these tests are within acceptable limits. Things look good !     Results for orders placed or performed in visit on 02/12/18   Hemoglobin A1c   Result Value Ref Range    Hemoglobin A1C 6.9 (H) 4.3 - 6.0 %   Albumin Random Urine Quantitative with Creat Ratio   Result Value Ref Range    Creatinine Urine 160 mg/dL    Albumin Urine mg/L 26 mg/L    Albumin Urine mg/g Cr 16.25 0 - 17 mg/g Cr   Basic metabolic panel   Result Value Ref Range    Sodium 135 133 - 144 mmol/L    Potassium 4.0 3.4 - 5.3 mmol/L    Chloride 103 94 - 109 mmol/L    Carbon Dioxide 20 20 - 32 mmol/L    Anion Gap 12 3 - 14 mmol/L    Glucose 216 (H) 70 - 99 mg/dL    Urea Nitrogen 16 7 - 30 mg/dL    Creatinine 0.82 0.66 - 1.25 mg/dL    GFR Estimate >90 >60 mL/min/1.7m2    GFR Estimate If Black >90 >60 mL/min/1.7m2    Calcium 8.5 8.5 - 10.1 mg/dL   Influenza A/B antigen   Result Value Ref Range    Influenza A/B Agn Specimen Nasal     Influenza A Negative NEG^Negative    Influenza B Negative NEG^Negative   Strep, Rapid Screen   Result Value Ref Range    Specimen Description Throat     Rapid Strep A Screen       NEGATIVE: No Group A streptococcal antigen detected by immunoassay, await culture report.   Beta strep group A culture   Result Value Ref Range    Specimen Description Throat     Culture Micro No beta hemolytic Streptococcus Group A isolated      If you have any questions or concerns, please call myself or my nurse at [phone].    Sincerely,        Cecilio Pro MD/garland
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
## 2018-02-12 NOTE — MR AVS SNAPSHOT
After Visit Summary   2/12/2018    Florian Lawrence    MRN: [id number]           Patient Information     Date Of Birth          1955        Visit Information        Provider Department      2/12/2018 4:10 PM Cecilio Pro MD [facility]        Today's Diagnoses     Controlled type 2 diabetes mellitus with diabetic polyneuropathy, without long-term current use of insulin (H)    -  1    Influenza-like illness        Hypertension goal BP (blood pressure) < 140/90        Hyperlipidemia LDL goal <100        Other male erectile dysfunction          Care Instructions    [facility]    If you have any questions regarding to your visit please contact your care team:     Team Pink:   Clinic Hours Telephone Number   Internal Medicine:  Dr. Melissa Honeycutt NP       7am-7pm  Monday - Thursday   7am-5pm  Fridays  [phone]  (Appointment scheduling available 24/7)    Questions about your visit?  Team Line  [phone]   Urgent Care - [place] - 11am-9pm Monday-Friday Saturday-Sunday- 9am-5pm   [place] - 5pm-9pm Monday-Friday Saturday-Sunday- 9am-5pm  [phone] - [place]   [phone] - [place]       What options do I have for visits at the clinic other than the traditional office visit?  To expand how we care for you, many of our providers are utilizing electronic visits (e-visits) and telephone visits, when medically appropriate, for interactions with their patients rather than a visit in the clinic.   We also offer nurse visits for many medical concerns. Just like any other service, we will bill your insurance company for this type of visit based on time spent on the phone with your provider. Not all insurance companies cover these visits. Please check with your medical insurance if this type of visit is covered. You will be responsible for any charges that are not paid by your insurance.      E-visits  via RotaBan:  generally incur a $35.00 fee.  Telephone visits:  Time spent on the phone: *charged based on time that is spent on the phone in increments of 10 minutes. Estimated cost:   5-10 mins $30.00   11-20 mins. $59.00   21-30 mins. $85.00   Use Luxofthart (secure email communication and access to your chart) to send your primary care provider a message or make an appointment. Ask someone on your Team how to sign up for [employer] Software[employer].    For a Price Quote for your services, please call our [employer] Line at [phone].    As always, Thank you for trusting us with your health care needs!    Daisy KIRKLAND CMA ([facility])            Follow-ups after your visit        Who to contact     If you have questions or need follow up information about today's clinic visit or your schedule please contact [facility] directly at [phone].  Normal or non-critical lab and imaging results will be communicated to you by Luxofthart, letter or phone within 4 business days after the clinic has received the results. If you do not hear from us within 7 days, please contact the clinic through Luxofthart or phone. If you have a critical or abnormal lab result, we will notify you by phone as soon as possible.  Submit refill requests through RotaBan or call your pharmacy and they will forward the refill request to us. Please allow 3 business days for your refill to be completed.          Additional Information About Your Visit        Luxofthart Information     RotaBan gives you secure access to your electronic health record. If you see a primary care provider, you can also send messages to your care team and make appointments. If you have questions, please call your primary care clinic.  If you do not have a primary care provider, please call [phone] and they will assist you.        Care EveryWhere ID     This is your Care EveryWhere ID. This could be used by other organizations to access your [place] medical records  [id number]         Your Vitals Were     Pulse Temperature Respirations Height Pulse Oximetry BMI (Body Mass Index)    92 98.7  F (37.1  C) (Oral) 16 6' (1.829 m) 97% 34.58 kg/m2       Blood Pressure from Last 3 Encounters:   02/12/18 134/82   12/08/17 111/73   06/09/17 136/70    Weight from Last 3 Encounters:   02/12/18 255 lb (115.7 kg)   06/09/17 262 lb (118.8 kg)   01/27/17 261 lb (118.4 kg)              We Performed the Following     Albumin Random Urine Quantitative with Creat Ratio     Basic metabolic panel     Hemoglobin A1c     Influenza A/B antigen     Strep, Rapid Screen          Today's Medication Changes          These changes are accurate as of 2/12/18  4:37 PM.  If you have any questions, ask your nurse or doctor.               These medicines have changed or have updated prescriptions.        Dose/Directions    glimepiride 2 MG tablet   Commonly known as:  AMARYL   This may have changed:  See the new instructions.   Used for:  Controlled type 2 diabetes mellitus with diabetic polyneuropathy, without long-term current use of insulin (H)   Changed by:  Cecilio Pro MD        TAKE 1 TABLET BY MOUTH  EVERY MORNING BEFORE  BREAKFAST   Quantity:  90 tablet   Refills:  1       insulin glargine 100 UNIT/ML injection   Commonly known as:  LANTUS SOLOSTAR   This may have changed:    - how much to take  - how to take this  - when to take this  - additional instructions   Used for:  Controlled type 2 diabetes mellitus with diabetic polyneuropathy, without long-term current use of insulin (H)   Changed by:  Cecilio Pro MD        Dose:  60 Units   Inject 60 Units Subcutaneous At Bedtime Inject 60 units at bedtime. Send patient a 3 month supply   Quantity:  60 mL   Refills:  1       metFORMIN 1000 MG tablet   Commonly known as:  GLUCOPHAGE   This may have changed:  Another medication with the same name was removed. Continue taking this medication, and follow the directions you see here.   Used for:  Controlled type 2  diabetes mellitus with diabetic polyneuropathy, without long-term current use of insulin (H)   Changed by:  Cecilio Pro MD        Dose:  1000 mg   Take 1 tablet (1,000 mg) by mouth 2 times daily (with meals) Please make appointment within 30 days   Quantity:  180 tablet   Refills:  1       sildenafil 100 MG tablet   Commonly known as:  VIAGRA   This may have changed:  reasons to take this   Used for:  Other male erectile dysfunction   Changed by:  Cecilio Pro MD        Dose:  100 mg   Take 1 tablet (100 mg) by mouth daily as needed Take 30 min to 4 hours before intercourse.  Never use with nitroglycerin, terazosin or doxazosin.   Quantity:  6 tablet   Refills:  1            Where to get your medicines      These medications were sent to [id number] - [facility], [address], [place] [address]     Phone:  [phone]     blood glucose monitoring test strip    glimepiride 2 MG tablet    insulin glargine 100 UNIT/ML injection    lisinopril 5 MG tablet    metFORMIN 1000 MG tablet    simvastatin 20 MG tablet         Some of these will need a paper prescription and others can be bought over the counter.  Ask your nurse if you have questions.     Bring a paper prescription for each of these medications     sildenafil 100 MG tablet                Primary Care Provider Office Phone # Fax #    Cecilio Pro [phone] [address] [facility] [address]        Equal Access to Services     DENY FERNANDEZ AH: Hadii jez millardo Somika, waaxda luqadaha, qaybta kaalmada adeegyada, mary ann ji ademaris shanks. So [facility] [phone].    ATENCIÓN: Si habla español, tiene a scott disposición servicios gratuitos de asistencia lingüística. Llrosaline al [phone].    We comply with applicable federal civil rights laws and [place] laws. We do not discriminate on the basis of race, color, national origin, age,  disability, sex, sexual orientation, or gender identity.            Thank you!     Thank you for choosing [facility]  for your care. Our goal is always to provide you with excellent care. Hearing back from our patients is one way we can continue to improve our services. Please take a few minutes to complete the written survey that you may receive in the mail after your visit with us. Thank you!             Your Updated Medication List - Protect others around you: Learn how to safely use, store and throw away your medicines at www.disposemymeds.org.          This list is accurate as of 2/12/18  4:37 PM.  Always use your most recent med list.                   Brand Name Dispense Instructions for use Diagnosis    aspirin 325 MG EC tablet     100 tablet    Take 1 tablet by mouth daily.    Type 2 diabetes, HbA1c goal < 7% (H)       blood glucose calibration NORMAL solution     1 Bottle    Use to check blood glucose monitor as needed as directed.    Type 2 diabetes, controlled, with neuropathy (H)       blood glucose monitoring test strip    ONETOUCH ULTRA    200 each    1 strip by In Vitro route 2 times daily Use to test blood sugar 2 times daily or as directed.    Controlled type 2 diabetes mellitus with diabetic polyneuropathy, without long-term current use of insulin (H)       clobetasol 0.05 % cream    TEMOVATE    45 g    Apply two times a day to affected areas until cleared    Eczema, unspecified type       glimepiride 2 MG tablet    AMARYL    90 tablet    TAKE 1 TABLET BY MOUTH  EVERY MORNING BEFORE  BREAKFAST    Controlled type 2 diabetes mellitus with diabetic polyneuropathy, without long-term current use of insulin (H)       ibuprofen 200 MG tablet    ADVIL/MOTRIN     Take 200 mg by mouth every 4 hours as needed        insulin glargine 100 UNIT/ML injection    LANTUS SOLOSTAR    60 mL    Inject 60 Units Subcutaneous At Bedtime Inject 60 units at bedtime. Send patient a 3 month supply    Controlled  type 2 diabetes mellitus with diabetic polyneuropathy, without long-term current use of insulin (H)       insulin pen needle 31G X 6 MM    ULTICARE MINI    100 each    Use 1 per day or as directed    Controlled type 2 diabetes mellitus with diabetic polyneuropathy, without long-term current use of insulin (H)       lisinopril 5 MG tablet    PRINIVIL/ZESTRIL    90 tablet    Take 1 tablet (5 mg) by mouth daily    Hypertension goal BP (blood pressure) < 140/90, Controlled type 2 diabetes mellitus with diabetic polyneuropathy, without long-term current use of insulin (H)       metFORMIN 1000 MG tablet    GLUCOPHAGE    180 tablet    Take 1 tablet (1,000 mg) by mouth 2 times daily (with meals) Please make appointment within 30 days    Controlled type 2 diabetes mellitus with diabetic polyneuropathy, without long-term current use of insulin (H)       MULTIPLE VITAMIN PO      Take 1 tablet by mouth 2 times daily.        order for DME     3 Box    Equipment being ordered: glucostrips for the  one touch ultra 2 glucometer 2 times a day    Type 2 diabetes, controlled, with neuropathy (H)       sildenafil 100 MG tablet    VIAGRA    6 tablet    Take 1 tablet (100 mg) by mouth daily as needed Take 30 min to 4 hours before intercourse.  Never use with nitroglycerin, terazosin or doxazosin.    Other male erectile dysfunction       simvastatin 20 MG tablet    ZOCOR    90 tablet    Take 1 tablet (20 mg) by mouth daily    Hyperlipidemia LDL goal <100       vitamin D 400 UNITS tablet      Take 400 Units by mouth 2 times daily.

## 2018-02-13 LAB
ANION GAP SERPL CALCULATED.3IONS-SCNC: 12 MMOL/L (ref 3–14)
BACTERIA SPEC CULT: NORMAL
BUN SERPL-MCNC: 16 MG/DL (ref 7–30)
CALCIUM SERPL-MCNC: 8.5 MG/DL (ref 8.5–10.1)
CHLORIDE SERPL-SCNC: 103 MMOL/L (ref 94–109)
CO2 SERPL-SCNC: 20 MMOL/L (ref 20–32)
CREAT SERPL-MCNC: 0.82 MG/DL (ref 0.66–1.25)
CREAT UR-MCNC: 160 MG/DL
GFR SERPL CREATININE-BSD FRML MDRD: >90 ML/MIN/1.7M2
GLUCOSE SERPL-MCNC: 216 MG/DL (ref 70–99)
MICROALBUMIN UR-MCNC: 26 MG/L
MICROALBUMIN/CREAT UR: 16.25 MG/G CR (ref 0–17)
POTASSIUM SERPL-SCNC: 4 MMOL/L (ref 3.4–5.3)
SODIUM SERPL-SCNC: 135 MMOL/L (ref 133–144)
SPECIMEN SOURCE: NORMAL

## 2018-02-16 ENCOUNTER — OFFICE VISIT (OUTPATIENT)
Dept: FAMILY MEDICINE | Facility: CLINIC | Age: 63
End: 2018-02-16
Payer: COMMERCIAL

## 2018-02-16 VITALS
SYSTOLIC BLOOD PRESSURE: 132 MMHG | OXYGEN SATURATION: 97 % | DIASTOLIC BLOOD PRESSURE: 79 MMHG | TEMPERATURE: 98.2 F | WEIGHT: 255 LBS | HEART RATE: 74 BPM | BODY MASS INDEX: 34.58 KG/M2

## 2018-02-16 DIAGNOSIS — J06.9 VIRAL URI WITH COUGH: Primary | ICD-10-CM

## 2018-02-16 PROCEDURE — 99213 OFFICE O/P EST LOW 20 MIN: CPT | Performed by: NURSE PRACTITIONER

## 2018-02-16 RX ORDER — FLUTICASONE PROPIONATE 50 MCG
1-2 SPRAY, SUSPENSION (ML) NASAL DAILY
Qty: 1 BOTTLE | Refills: 1 | Status: SHIPPED | OUTPATIENT
Start: 2018-02-16 | End: 2019-12-17

## 2018-02-16 ASSESSMENT — PAIN SCALES - GENERAL: PAINLEVEL: NO PAIN (0)

## 2018-02-16 NOTE — MR AVS SNAPSHOT
After Visit Summary   2/16/2018    Florian Lawrence    MRN: [id number]           Patient Information     Date Of Birth          1955        Visit Information        Provider Department      2/16/2018 8:40 AM Cintia Krishnan APRN CNP [facility]        Today's Diagnoses     Viral URI with cough    -  1       Follow-ups after your visit        Who to contact     If you have questions or need follow up information about today's clinic visit or your schedule please contact [facility] directly at [phone].  Normal or non-critical lab and imaging results will be communicated to you by Gliphohart, letter or phone within 4 business days after the clinic has received the results. If you do not hear from us within 7 days, please contact the clinic through Gliphohart or phone. If you have a critical or abnormal lab result, we will notify you by phone as soon as possible.  Submit refill requests through Mozy or call your pharmacy and they will forward the refill request to us. Please allow 3 business days for your refill to be completed.          Additional Information About Your Visit        MyChart Information     Mozy gives you secure access to your electronic health record. If you see a primary care provider, you can also send messages to your care team and make appointments. If you have questions, please call your primary care clinic.  If you do not have a primary care provider, please call [phone] and they will assist you.        Care EveryWhere ID     This is your Care EveryWhere ID. This could be used by other organizations to access your [place] medical records  [id number]        Your Vitals Were     Pulse Temperature Pulse Oximetry BMI (Body Mass Index)          74 98.2  F (36.8  C) (Oral) 97% 34.58 kg/m2         Blood Pressure from Last 3 Encounters:   02/16/18 132/79   02/12/18 134/82   12/08/17 111/73    Weight from Last 3  Encounters:   02/16/18 255 lb (115.7 kg)   02/12/18 255 lb (115.7 kg)   06/09/17 262 lb (118.8 kg)              Today, you had the following     No orders found for display         Today's Medication Changes          These changes are accurate as of 2/16/18  9:14 AM.  If you have any questions, ask your nurse or doctor.               Start taking these medicines.        Dose/Directions    fluticasone 50 MCG/ACT spray   Commonly known as:  FLONASE   Used for:  Viral URI with cough   Started by:  Cintia Krishnan APRN CNP        Dose:  1-2 spray   Spray 1-2 sprays into both nostrils daily   Quantity:  1 Bottle   Refills:  1            Where to get your medicines      These medications were sent to [id number] - [place], [place] - [address] AT [place] [address]  [address], [place] [address]     Phone:  [phone]     fluticasone 50 MCG/ACT spray                Primary Care Provider Office Phone # Fax #    Cecilio Pro [phone] [address] [facility] [address]        Equal Access to Services     DENY FERNANDEZ AH: Hadii jez castro hadasho Soomaali, waaxda luqadaha, qaybta kaalmada adeegyada, mary ann shanks. So [facility] [phone].    ATENCIÓN: Si habla español, tiene a scott disposición servicios gratuitos de asistencia lingüística. Llame al [phone].    We comply with applicable federal civil rights laws and [place] laws. We do not discriminate on the basis of race, color, national origin, age, disability, sex, sexual orientation, or gender identity.            Thank you!     Thank you for choosing [facility]  for your care. Our goal is always to provide you with excellent care. Hearing back from our patients is one way we can continue to improve our services. Please take a few minutes to complete the written survey that you may receive in the mail after your visit with us. Thank you!              Your Updated Medication List - Protect others around you: Learn how to safely use, store and throw away your medicines at www.disposemymeds.org.          This list is accurate as of 2/16/18  9:14 AM.  Always use your most recent med list.                   Brand Name Dispense Instructions for use Diagnosis    aspirin 325 MG EC tablet     100 tablet    Take 1 tablet by mouth daily.    Type 2 diabetes, HbA1c goal < 7% (H)       blood glucose calibration NORMAL solution     1 Bottle    Use to check blood glucose monitor as needed as directed.    Type 2 diabetes, controlled, with neuropathy (H)       blood glucose monitoring test strip    ONETOUCH ULTRA    200 each    1 strip by In Vitro route 2 times daily Use to test blood sugar 2 times daily or as directed.    Controlled type 2 diabetes mellitus with diabetic polyneuropathy, without long-term current use of insulin (H)       clobetasol 0.05 % cream    TEMOVATE    45 g    Apply two times a day to affected areas until cleared    Eczema, unspecified type       fluticasone 50 MCG/ACT spray    FLONASE    1 Bottle    Spray 1-2 sprays into both nostrils daily    Viral URI with cough       glimepiride 2 MG tablet    AMARYL    90 tablet    TAKE 1 TABLET BY MOUTH  EVERY MORNING BEFORE  BREAKFAST    Controlled type 2 diabetes mellitus with diabetic polyneuropathy, without long-term current use of insulin (H)       ibuprofen 200 MG tablet    ADVIL/MOTRIN     Take 200 mg by mouth every 4 hours as needed        insulin glargine 100 UNIT/ML injection    LANTUS SOLOSTAR    60 mL    Inject 60 Units Subcutaneous At Bedtime Inject 60 units at bedtime. Send patient a 3 month supply    Controlled type 2 diabetes mellitus with diabetic polyneuropathy, without long-term current use of insulin (H)       insulin pen needle 31G X 6 MM    ULTICARE MINI    100 each    Use 1 per day or as directed    Controlled type 2 diabetes mellitus with diabetic polyneuropathy, without long-term  current use of insulin (H)       lisinopril 5 MG tablet    PRINIVIL/ZESTRIL    90 tablet    Take 1 tablet (5 mg) by mouth daily    Hypertension goal BP (blood pressure) < 140/90, Controlled type 2 diabetes mellitus with diabetic polyneuropathy, without long-term current use of insulin (H)       metFORMIN 1000 MG tablet    GLUCOPHAGE    180 tablet    Take 1 tablet (1,000 mg) by mouth 2 times daily (with meals) Please make appointment within 30 days    Controlled type 2 diabetes mellitus with diabetic polyneuropathy, without long-term current use of insulin (H)       MULTIPLE VITAMIN PO      Take 1 tablet by mouth 2 times daily.        order for DME     3 Box    Equipment being ordered: glucostrips for the  one touch ultra 2 glucometer 2 times a day    Type 2 diabetes, controlled, with neuropathy (H)       sildenafil 100 MG tablet    VIAGRA    6 tablet    Take 1 tablet (100 mg) by mouth daily as needed Take 30 min to 4 hours before intercourse.  Never use with nitroglycerin, terazosin or doxazosin.    Other male erectile dysfunction       simvastatin 20 MG tablet    ZOCOR    90 tablet    Take 1 tablet (20 mg) by mouth daily    Hyperlipidemia LDL goal <100       vitamin D 400 UNITS tablet      Take 400 Units by mouth 2 times daily.
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
## 2018-02-25 DIAGNOSIS — I10 HYPERTENSION GOAL BP (BLOOD PRESSURE) < 140/90: ICD-10-CM

## 2018-02-25 DIAGNOSIS — E78.5 HYPERLIPIDEMIA LDL GOAL <100: ICD-10-CM

## 2018-02-25 DIAGNOSIS — E11.42 CONTROLLED TYPE 2 DIABETES MELLITUS WITH DIABETIC POLYNEUROPATHY, WITHOUT LONG-TERM CURRENT USE OF INSULIN (H): ICD-10-CM

## 2018-02-26 RX ORDER — LISINOPRIL 5 MG/1
TABLET ORAL
Qty: 90 TABLET | Refills: 3 | Status: SHIPPED | OUTPATIENT
Start: 2018-02-26 | End: 2018-12-10

## 2018-02-26 RX ORDER — INSULIN GLARGINE 100 [IU]/ML
INJECTION, SOLUTION SUBCUTANEOUS
Qty: 60 ML | Refills: 1 | Status: SHIPPED | OUTPATIENT
Start: 2018-02-26 | End: 2018-08-10

## 2018-02-26 RX ORDER — SIMVASTATIN 20 MG
TABLET ORAL
Qty: 90 TABLET | Refills: 3 | Status: SHIPPED | OUTPATIENT
Start: 2018-02-26 | End: 2019-04-11

## 2018-02-26 NOTE — TELEPHONE ENCOUNTER
Request is from mail order pharmacy    Resent to [facility] mail order pharmacy      Odette Madden RN

## 2018-04-01 DIAGNOSIS — E11.42 CONTROLLED TYPE 2 DIABETES MELLITUS WITH DIABETIC POLYNEUROPATHY, WITHOUT LONG-TERM CURRENT USE OF INSULIN (H): ICD-10-CM

## 2018-04-04 RX ORDER — GLIMEPIRIDE 2 MG/1
TABLET ORAL
Qty: 90 TABLET | Refills: 0 | Status: SHIPPED | OUTPATIENT
Start: 2018-04-04 | End: 2018-07-16

## 2018-04-04 NOTE — TELEPHONE ENCOUNTER
Prescription approved per [facility] [place] [place] Refill Protocol.    Katy Graham RN  [facility]

## 2018-08-10 DIAGNOSIS — E11.42 CONTROLLED TYPE 2 DIABETES MELLITUS WITH DIABETIC POLYNEUROPATHY, WITHOUT LONG-TERM CURRENT USE OF INSULIN (H): ICD-10-CM

## 2018-09-06 DIAGNOSIS — E11.42 CONTROLLED TYPE 2 DIABETES MELLITUS WITH DIABETIC POLYNEUROPATHY, WITHOUT LONG-TERM CURRENT USE OF INSULIN (H): Primary | ICD-10-CM

## 2018-09-07 RX ORDER — INSULIN GLARGINE 100 [IU]/ML
INJECTION, SOLUTION SUBCUTANEOUS
Qty: 30 ML | Refills: 0 | Status: SHIPPED | OUTPATIENT
Start: 2018-09-07 | End: 2018-09-25
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
## 2018-09-25 ENCOUNTER — OFFICE VISIT (OUTPATIENT)
Dept: FAMILY MEDICINE | Facility: CLINIC | Age: 63
End: 2018-09-25
Payer: COMMERCIAL

## 2018-09-25 ENCOUNTER — RADIANT APPOINTMENT (OUTPATIENT)
Dept: GENERAL RADIOLOGY | Facility: CLINIC | Age: 63
End: 2018-09-25
Attending: INTERNAL MEDICINE
Payer: COMMERCIAL

## 2018-09-25 VITALS
TEMPERATURE: 96.8 F | SYSTOLIC BLOOD PRESSURE: 144 MMHG | BODY MASS INDEX: 35.53 KG/M2 | DIASTOLIC BLOOD PRESSURE: 80 MMHG | WEIGHT: 262 LBS | OXYGEN SATURATION: 96 % | HEART RATE: 74 BPM | RESPIRATION RATE: 18 BRPM

## 2018-09-25 DIAGNOSIS — E11.42 CONTROLLED TYPE 2 DIABETES MELLITUS WITH DIABETIC POLYNEUROPATHY, WITHOUT LONG-TERM CURRENT USE OF INSULIN (H): Primary | ICD-10-CM

## 2018-09-25 DIAGNOSIS — Z13.89 SCREENING FOR DIABETIC PERIPHERAL NEUROPATHY: ICD-10-CM

## 2018-09-25 DIAGNOSIS — Z23 NEED FOR PROPHYLACTIC VACCINATION AND INOCULATION AGAINST INFLUENZA: ICD-10-CM

## 2018-09-25 DIAGNOSIS — I10 HYPERTENSION GOAL BP (BLOOD PRESSURE) < 140/90: ICD-10-CM

## 2018-09-25 DIAGNOSIS — M16.51 POST-TRAUMATIC OSTEOARTHRITIS OF RIGHT HIP: ICD-10-CM

## 2018-09-25 DIAGNOSIS — Z11.4 SCREENING FOR HIV (HUMAN IMMUNODEFICIENCY VIRUS): ICD-10-CM

## 2018-09-25 DIAGNOSIS — L30.9 ECZEMA, UNSPECIFIED TYPE: ICD-10-CM

## 2018-09-25 DIAGNOSIS — N52.8 OTHER MALE ERECTILE DYSFUNCTION: ICD-10-CM

## 2018-09-25 DIAGNOSIS — E78.5 HYPERLIPIDEMIA LDL GOAL <100: ICD-10-CM

## 2018-09-25 LAB
CHOLEST SERPL-MCNC: 122 MG/DL
HBA1C MFR BLD: 6.9 % (ref 0–5.6)
HDLC SERPL-MCNC: 32 MG/DL
HIV 1+2 AB+HIV1 P24 AG SERPL QL IA: NONREACTIVE
LDLC SERPL CALC-MCNC: 18 MG/DL
NONHDLC SERPL-MCNC: 90 MG/DL
TRIGL SERPL-MCNC: 360 MG/DL

## 2018-09-25 PROCEDURE — 87389 HIV-1 AG W/HIV-1&-2 AB AG IA: CPT | Performed by: INTERNAL MEDICINE

## 2018-09-25 PROCEDURE — 99214 OFFICE O/P EST MOD 30 MIN: CPT | Mod: 25 | Performed by: INTERNAL MEDICINE

## 2018-09-25 PROCEDURE — 90471 IMMUNIZATION ADMIN: CPT | Performed by: INTERNAL MEDICINE

## 2018-09-25 PROCEDURE — 83036 HEMOGLOBIN GLYCOSYLATED A1C: CPT | Performed by: INTERNAL MEDICINE

## 2018-09-25 PROCEDURE — 80061 LIPID PANEL: CPT | Performed by: INTERNAL MEDICINE

## 2018-09-25 PROCEDURE — 90686 IIV4 VACC NO PRSV 0.5 ML IM: CPT | Performed by: INTERNAL MEDICINE

## 2018-09-25 PROCEDURE — 99207 C FOOT EXAM  NO CHARGE: CPT | Performed by: INTERNAL MEDICINE

## 2018-09-25 PROCEDURE — 72170 X-RAY EXAM OF PELVIS: CPT

## 2018-09-25 PROCEDURE — 36415 COLL VENOUS BLD VENIPUNCTURE: CPT | Performed by: INTERNAL MEDICINE

## 2018-09-25 RX ORDER — GLIMEPIRIDE 2 MG/1
2 TABLET ORAL
Qty: 90 TABLET | Refills: 3 | Status: SHIPPED | OUTPATIENT
Start: 2018-09-25 | End: 2019-10-14

## 2018-09-25 RX ORDER — CLOBETASOL PROPIONATE 0.5 MG/G
CREAM TOPICAL
Qty: 45 G | Refills: 3 | Status: SHIPPED | OUTPATIENT
Start: 2018-09-25 | End: 2022-12-13

## 2018-09-25 RX ORDER — LISINOPRIL 5 MG/1
5 TABLET ORAL DAILY
Qty: 90 TABLET | Refills: 3 | Status: SHIPPED | OUTPATIENT
Start: 2018-09-25 | End: 2018-12-10

## 2018-09-25 RX ORDER — SIMVASTATIN 20 MG
20 TABLET ORAL DAILY
Qty: 90 TABLET | Refills: 3 | Status: SHIPPED | OUTPATIENT
Start: 2018-09-25 | End: 2019-02-14

## 2018-09-25 RX ORDER — SILDENAFIL 100 MG/1
100 TABLET, FILM COATED ORAL DAILY PRN
Qty: 6 TABLET | Refills: 1 | Status: SHIPPED | OUTPATIENT
Start: 2018-09-25 | End: 2019-04-11

## 2018-09-25 NOTE — MR AVS SNAPSHOT
After Visit Summary   9/25/2018    Florian Lawrence    MRN: [id number]           Patient Information     Date Of Birth          1955        Visit Information        Provider Department      9/25/2018 9:10 AM Cecilio Pro MD [facility]        Today's Diagnoses     Controlled type 2 diabetes mellitus with diabetic polyneuropathy, without long-term current use of insulin (H)    -  1    Screening for diabetic peripheral neuropathy        Need for prophylactic vaccination and inoculation against influenza        Hypertension goal BP (blood pressure) < 140/90        Hyperlipidemia LDL goal <100        Other male erectile dysfunction        Lump        Screening for HIV (human immunodeficiency virus)        Eczema, unspecified type        Post-traumatic osteoarthritis of right hip          Care Instructions    We discussed the new Shingrix vaccine. Take a look at it and if you decide you want to get vaccinated you can get it at basically any pharmacy.            Follow-ups after your visit        Additional Services     PODIATRY/FOOT & ANKLE SURGERY REFERRAL       Your provider has referred you to: [facility]: [facility] [phone]   [email or web address]    Please be aware that coverage of these services is subject to the terms and limitations of your health insurance plan.  Call member services at your health plan with any benefit or coverage questions.      Please bring the following to your appointment:  >>   Any x-rays, CTs or MRIs which have been performed.  Contact the facility where they were done to arrange for  prior to your scheduled appointment.    >>   List of current medications   >>   This referral request   >>   Any documents/labs given to you for this referral                  Future tests that were ordered for you today     Open Future Orders        Priority Expected Expires Ordered    XR Pelvis 1/2 Views Routine 9/25/2018  9/25/2019 9/25/2018            Who to contact     If you have questions or need follow up information about today's clinic visit or your schedule please contact [facility] [place] directly at [phone].  Normal or non-critical lab and imaging results will be communicated to you by MyChart, letter or phone within 4 business days after the clinic has received the results. If you do not hear from us within 7 days, please contact the clinic through [facility] or phone. If you have a critical or abnormal lab result, we will notify you by phone as soon as possible.  Submit refill requests through [facility] or call your pharmacy and they will forward the refill request to us. Please allow 3 business days for your refill to be completed.          Additional Information About Your Visit        [facility] Information     [facility] gives you secure access to your electronic health record. If you see a primary care provider, you can also send messages to your care team and make appointments. If you have questions, please call your primary care clinic.  If you do not have a primary care provider, please call [phone] and they will assist you.        Care EveryWhere ID     This is your Care EveryWhere ID. This could be used by other organizations to access your [facility] medical records  [id number]        Your Vitals Were     Pulse Temperature Respirations Pulse Oximetry BMI (Body Mass Index)       74 96.8  F (36  C) (Oral) 18 96% 35.53 kg/m2        Blood Pressure from Last 3 Encounters:   09/25/18 144/80   02/16/18 132/79   02/12/18 134/82    Weight from Last 3 Encounters:   09/25/18 262 lb (118.8 kg)   02/16/18 255 lb (115.7 kg)   02/12/18 255 lb (115.7 kg)              We Performed the Following     FOOT EXAM  NO CHARGE [30276.114]     HEMOGLOBIN A1C     HIV Screening     Lipid panel reflex to direct LDL Fasting     PODIATRY/FOOT & ANKLE SURGERY REFERRAL          Today's Medication Changes          These changes are  accurate as of 9/25/18  9:44 AM.  If you have any questions, ask your nurse or doctor.               These medicines have changed or have updated prescriptions.        Dose/Directions    glimepiride 2 MG tablet   Commonly known as:  AMARYL   This may have changed:  additional instructions   Used for:  Controlled type 2 diabetes mellitus with diabetic polyneuropathy, without long-term current use of insulin (H)   Changed by:  Cecilio Pro MD        Dose:  2 mg   Take 1 tablet (2 mg) by mouth every morning (before breakfast)   Quantity:  90 tablet   Refills:  3       insulin glargine 100 UNIT/ML injection   Commonly known as:  LANTUS SOLOSTAR   This may have changed:  Another medication with the same name was removed. Continue taking this medication, and follow the directions you see here.   Used for:  Controlled type 2 diabetes mellitus with diabetic polyneuropathy, without long-term current use of insulin (H)   Changed by:  Cecilio Pro MD        Dose:  60 Units   Inject 60 Units Subcutaneous At Bedtime Inject 60 units at bedtime. Send patient a 3 month supply   Quantity:  60 mL   Refills:  1            Where to get your medicines      These medications were sent to [facility] - [address], [facility] [address]     Phone:  [phone]     blood glucose monitoring test strip    clobetasol 0.05 % cream    glimepiride 2 MG tablet    insulin glargine 100 UNIT/ML injection    insulin pen needle 31G X 6 MM    lisinopril 5 MG tablet    metFORMIN 1000 MG tablet    simvastatin 20 MG tablet         Some of these will need a paper prescription and others can be bought over the counter.  Ask your nurse if you have questions.     Bring a paper prescription for each of these medications     sildenafil 100 MG tablet                Primary Care Provider Office Phone # Fax #    Cecilio Pro [phone] [address] [facility]  [place]  [address]        Equal Access to Services     [facility]: Hadii aad ku hadjazzyduyen Powerali, waranda luqisiha, qaybta nevinmikemary ann sorto. So [facility] [phone].    ATENCIÓN: Si habla español, tiene a scott disposición servicios gratuitos de asistencia lingüística. Hallieame al [phone].    We comply with applicable federal civil rights laws and [place] laws. We do not discriminate on the basis of race, color, national origin, age, disability, sex, sexual orientation, or gender identity.            Thank you!     Thank you for choosing [facility]  for your care. Our goal is always to provide you with excellent care. Hearing back from our patients is one way we can continue to improve our services. Please take a few minutes to complete the written survey that you may receive in the mail after your visit with us. Thank you!             Your Updated Medication List - Protect others around you: Learn how to safely use, store and throw away your medicines at www.disposemymeds.org.          This list is accurate as of 9/25/18  9:44 AM.  Always use your most recent med list.                   Brand Name Dispense Instructions for use Diagnosis    aspirin 325 MG EC tablet     100 tablet    Take 1 tablet by mouth daily.    Type 2 diabetes, HbA1c goal < 7% (H)       blood glucose calibration Normal solution     1 Bottle    Use to check blood glucose monitor as needed as directed.    Type 2 diabetes, controlled, with neuropathy (H)       clobetasol 0.05 % cream    TEMOVATE    45 g    Apply two times a day to affected areas until cleared    Eczema, unspecified type       fluticasone 50 MCG/ACT spray    FLONASE    1 Bottle    Spray 1-2 sprays into both nostrils daily    Viral URI with cough       glimepiride 2 MG tablet    AMARYL    90 tablet    Take 1 tablet (2 mg) by mouth every morning (before breakfast)    Controlled type 2 diabetes mellitus with diabetic polyneuropathy, without  long-term current use of insulin (H)       ibuprofen 200 MG tablet    ADVIL/MOTRIN     Take 200 mg by mouth every 4 hours as needed        insulin glargine 100 UNIT/ML injection    LANTUS SOLOSTAR    60 mL    Inject 60 Units Subcutaneous At Bedtime Inject 60 units at bedtime. Send patient a 3 month supply    Controlled type 2 diabetes mellitus with diabetic polyneuropathy, without long-term current use of insulin (H)       insulin pen needle 31G X 6 MM    ULTICARE MINI    100 each    Use 1 per day or as directed    Controlled type 2 diabetes mellitus with diabetic polyneuropathy, without long-term current use of insulin (H)       * lisinopril 5 MG tablet    PRINIVIL/ZESTRIL    90 tablet    TAKE 1 TABLET BY MOUTH  DAILY    Hypertension goal BP (blood pressure) < 140/90, Controlled type 2 diabetes mellitus with diabetic polyneuropathy, without long-term current use of insulin (H)       * lisinopril 5 MG tablet    PRINIVIL/ZESTRIL    90 tablet    Take 1 tablet (5 mg) by mouth daily    Hypertension goal BP (blood pressure) < 140/90, Controlled type 2 diabetes mellitus with diabetic polyneuropathy, without long-term current use of insulin (H)       metFORMIN 1000 MG tablet    GLUCOPHAGE    180 tablet    TAKE 1 TABLET BY MOUTH TWO  TIMES DAILY WITH MEALS    Controlled type 2 diabetes mellitus with diabetic polyneuropathy, without long-term current use of insulin (H)       MULTIPLE VITAMIN PO      Take 1 tablet by mouth 2 times daily.        * ONETOUCH ULTRA test strip   Generic drug:  blood glucose monitoring     200 strip    CHECK BLOOD SUGAR TWO TIMES DAILY OR AS DIRECTED    Controlled type 2 diabetes mellitus with diabetic polyneuropathy, without long-term current use of insulin (H)       * blood glucose monitoring test strip    ONETOUCH ULTRA    200 each    1 strip by In Vitro route 2 times daily Use to test blood sugar 2 times daily or as directed.    Controlled type 2 diabetes mellitus with diabetic polyneuropathy,  without long-term current use of insulin (H)       order for DME     3 Box    Equipment being ordered: glucostrips for the  one touch ultra 2 glucometer 2 times a day    Type 2 diabetes, controlled, with neuropathy (H)       sildenafil 100 MG tablet    VIAGRA    6 tablet    Take 1 tablet (100 mg) by mouth daily as needed Take 30 min to 4 hours before intercourse.  Never use with nitroglycerin, terazosin or doxazosin.    Other male erectile dysfunction       * simvastatin 20 MG tablet    ZOCOR    90 tablet    TAKE 1 TABLET BY MOUTH  DAILY    Hyperlipidemia LDL goal <100       * simvastatin 20 MG tablet    ZOCOR    90 tablet    Take 1 tablet (20 mg) by mouth daily    Hyperlipidemia LDL goal <100       vitamin D 400 units tablet      Take 400 Units by mouth 2 times daily.        * Notice:  This list has 6 medication(s) that are the same as other medications prescribed for you. Read the directions carefully, and ask your doctor or other care provider to review them with you.

## 2018-09-25 NOTE — PATIENT INSTRUCTIONS
We discussed the new Shingrix vaccine. Take a look at it and if you decide you want to get vaccinated you can get it at basically any pharmacy.

## 2018-10-18 ENCOUNTER — OFFICE VISIT (OUTPATIENT)
Dept: FAMILY MEDICINE | Facility: CLINIC | Age: 63
End: 2018-10-18
Payer: COMMERCIAL

## 2018-10-18 VITALS
RESPIRATION RATE: 16 BRPM | TEMPERATURE: 97.6 F | HEART RATE: 77 BPM | SYSTOLIC BLOOD PRESSURE: 142 MMHG | HEIGHT: 72 IN | BODY MASS INDEX: 34.65 KG/M2 | WEIGHT: 255.8 LBS | OXYGEN SATURATION: 97 % | DIASTOLIC BLOOD PRESSURE: 70 MMHG

## 2018-10-18 DIAGNOSIS — B34.9 VIRAL ILLNESS: ICD-10-CM

## 2018-10-18 DIAGNOSIS — R09.81 NASAL CONGESTION: ICD-10-CM

## 2018-10-18 DIAGNOSIS — R05.9 COUGH: Primary | ICD-10-CM

## 2018-10-18 PROCEDURE — 99213 OFFICE O/P EST LOW 20 MIN: CPT | Performed by: NURSE PRACTITIONER

## 2018-10-18 RX ORDER — PREDNISONE 20 MG/1
40 TABLET ORAL DAILY
Qty: 10 TABLET | Refills: 0 | Status: SHIPPED | OUTPATIENT
Start: 2018-10-18 | End: 2019-02-05

## 2018-10-18 ASSESSMENT — PAIN SCALES - GENERAL: PAINLEVEL: MILD PAIN (2)
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
## 2018-10-18 NOTE — MR AVS SNAPSHOT
After Visit Summary   10/18/2018    Florian Lawrence    MRN: [id number]           Patient Information     Date Of Birth          1955        Visit Information        Provider Department      10/18/2018 2:40 PM Amelia Adams NP [facility]        Today's Diagnoses     Cough    -  1    Viral illness          Care Instructions    Flonase nasal spray to clean the sinuses          Follow-ups after your visit        Follow-up notes from your care team     Return for if symptoms worsen or fail to improve.      Who to contact     If you have questions or need follow up information about today's clinic visit or your schedule please contact [facility] directly at [phone].  Normal or non-critical lab and imaging results will be communicated to you by MyChart, letter or phone within 4 business days after the clinic has received the results. If you do not hear from us within 7 days, please contact the clinic through [facility] or phone. If you have a critical or abnormal lab result, we will notify you by phone as soon as possible.  Submit refill requests through [employer] or call your pharmacy and they will forward the refill request to us. Please allow 3 business days for your refill to be completed.          Additional Information About Your Visit        MyChart Information     [employer] gives you secure access to your electronic health record. If you see a primary care provider, you can also send messages to your care team and make appointments. If you have questions, please call your primary care clinic.  If you do not have a primary care provider, please call [phone] and they will assist you.        Care EveryWhere ID     This is your Care EveryWhere ID. This could be used by other organizations to access your [place] medical records  [id number]        Your Vitals Were     Pulse Temperature Respirations Height Pulse Oximetry BMI (Body Mass Index)    77 97.6   F (36.4  C) (Oral) 16 6' (1.829 m) 97% 34.69 kg/m2       Blood Pressure from Last 3 Encounters:   10/18/18 142/70   09/25/18 144/80   02/16/18 132/79    Weight from Last 3 Encounters:   10/18/18 255 lb 12.8 oz (116 kg)   09/25/18 262 lb (118.8 kg)   02/16/18 255 lb (115.7 kg)              Today, you had the following     No orders found for display         Today's Medication Changes          These changes are accurate as of 10/18/18  3:20 PM.  If you have any questions, ask your nurse or doctor.               Start taking these medicines.        Dose/Directions    predniSONE 20 MG tablet   Commonly known as:  DELTASONE   Used for:  Cough   Started by:  Amelia Adams NP        Dose:  40 mg   Take 2 tablets (40 mg) by mouth daily for 5 days   Quantity:  10 tablet   Refills:  0            Where to get your medicines      [facility] [address] [place] [address] AT [place] [address], [facility] [address]     Phone:  [phone]     predniSONE 20 MG tablet                Primary Care Provider Office Phone # Fax #    Cecilio Pro [phone]       [address] [facility] [address]        Equal Access to Services     [facility] AH: Hadii jez castro hadasho Somika, waaxda luqadaha, qaybta kaalmada adeegyada, mary ann webb . So [facility] [phone].    ATENCIÓN: Si habla español, tiene a scott disposición servicios gratuitos de asistencia lingüística. Llame al [phone].    We comply with applicable federal civil rights laws and [place] laws. We do not discriminate on the basis of race, color, national origin, age, disability, sex, sexual orientation, or gender identity.            Thank you!     Thank you for choosing [facility]  for your care. Our goal is always to provide you with excellent care. Hearing back from our patients is one way we can continue to improve our  services. Please take a few minutes to complete the written survey that you may receive in the mail after your visit with us. Thank you!             Your Updated Medication List - Protect others around you: Learn how to safely use, store and throw away your medicines at www.disposemymeds.org.          This list is accurate as of 10/18/18  3:20 PM.  Always use your most recent med list.                   Brand Name Dispense Instructions for use Diagnosis    aspirin 325 MG EC tablet     100 tablet    Take 1 tablet by mouth daily.    Type 2 diabetes, HbA1c goal < 7% (H)       blood glucose calibration Normal solution     1 Bottle    Use to check blood glucose monitor as needed as directed.    Type 2 diabetes, controlled, with neuropathy (H)       clobetasol 0.05 % cream    TEMOVATE    45 g    Apply two times a day to affected areas until cleared    Eczema, unspecified type       fluticasone 50 MCG/ACT spray    FLONASE    1 Bottle    Spray 1-2 sprays into both nostrils daily    Viral URI with cough       glimepiride 2 MG tablet    AMARYL    90 tablet    Take 1 tablet (2 mg) by mouth every morning (before breakfast)    Controlled type 2 diabetes mellitus with diabetic polyneuropathy, without long-term current use of insulin (H)       ibuprofen 200 MG tablet    ADVIL/MOTRIN     Take 200 mg by mouth every 4 hours as needed        insulin glargine 100 UNIT/ML injection    LANTUS SOLOSTAR    60 mL    Inject 60 Units Subcutaneous At Bedtime Inject 60 units at bedtime. Send patient a 3 month supply    Controlled type 2 diabetes mellitus with diabetic polyneuropathy, without long-term current use of insulin (H)       insulin pen needle 31G X 6 MM    ULTICARE MINI    100 each    Use 1 per day or as directed    Controlled type 2 diabetes mellitus with diabetic polyneuropathy, without long-term current use of insulin (H)       * lisinopril 5 MG tablet    PRINIVIL/ZESTRIL    90 tablet    TAKE 1 TABLET BY MOUTH  DAILY     Hypertension goal BP (blood pressure) < 140/90, Controlled type 2 diabetes mellitus with diabetic polyneuropathy, without long-term current use of insulin (H)       * lisinopril 5 MG tablet    PRINIVIL/ZESTRIL    90 tablet    Take 1 tablet (5 mg) by mouth daily    Hypertension goal BP (blood pressure) < 140/90, Controlled type 2 diabetes mellitus with diabetic polyneuropathy, without long-term current use of insulin (H)       metFORMIN 1000 MG tablet    GLUCOPHAGE    180 tablet    TAKE 1 TABLET BY MOUTH TWO  TIMES DAILY WITH MEALS    Controlled type 2 diabetes mellitus with diabetic polyneuropathy, without long-term current use of insulin (H)       MULTIPLE VITAMIN PO      Take 1 tablet by mouth 2 times daily.        * ONETOUCH ULTRA test strip   Generic drug:  blood glucose monitoring     200 strip    CHECK BLOOD SUGAR TWO TIMES DAILY OR AS DIRECTED    Controlled type 2 diabetes mellitus with diabetic polyneuropathy, without long-term current use of insulin (H)       * blood glucose monitoring test strip    ONETOUCH ULTRA    200 each    1 strip by In Vitro route 2 times daily Use to test blood sugar 2 times daily or as directed.    Controlled type 2 diabetes mellitus with diabetic polyneuropathy, without long-term current use of insulin (H)       order for DME     3 Box    Equipment being ordered: glucostrips for the  one touch ultra 2 glucometer 2 times a day    Type 2 diabetes, controlled, with neuropathy (H)       predniSONE 20 MG tablet    DELTASONE    10 tablet    Take 2 tablets (40 mg) by mouth daily for 5 days    Cough       sildenafil 100 MG tablet    VIAGRA    6 tablet    Take 1 tablet (100 mg) by mouth daily as needed Take 30 min to 4 hours before intercourse.  Never use with nitroglycerin, terazosin or doxazosin.    Other male erectile dysfunction       * simvastatin 20 MG tablet    ZOCOR    90 tablet    TAKE 1 TABLET BY MOUTH  DAILY    Hyperlipidemia LDL goal <100       * simvastatin 20 MG tablet     ZOCOR    90 tablet    Take 1 tablet (20 mg) by mouth daily    Hyperlipidemia LDL goal <100       vitamin D 400 units tablet      Take 400 Units by mouth 2 times daily.        * Notice:  This list has 6 medication(s) that are the same as other medications prescribed for you. Read the directions carefully, and ask your doctor or other care provider to review them with you.

## 2018-10-23 ENCOUNTER — OFFICE VISIT (OUTPATIENT)
Dept: FAMILY MEDICINE | Facility: CLINIC | Age: 63
End: 2018-10-23
Payer: COMMERCIAL

## 2018-10-23 VITALS
OXYGEN SATURATION: 96 % | DIASTOLIC BLOOD PRESSURE: 72 MMHG | HEIGHT: 71 IN | TEMPERATURE: 96.6 F | WEIGHT: 257 LBS | BODY MASS INDEX: 35.98 KG/M2 | RESPIRATION RATE: 20 BRPM | SYSTOLIC BLOOD PRESSURE: 118 MMHG | HEART RATE: 66 BPM

## 2018-10-23 DIAGNOSIS — Z13.5 SCREENING FOR DIABETIC RETINOPATHY: ICD-10-CM

## 2018-10-23 DIAGNOSIS — J20.9 ACUTE BRONCHITIS, UNSPECIFIED ORGANISM: ICD-10-CM

## 2018-10-23 DIAGNOSIS — J98.8 WHEEZING-ASSOCIATED RESPIRATORY INFECTION (WARI): Primary | ICD-10-CM

## 2018-10-23 PROCEDURE — 99213 OFFICE O/P EST LOW 20 MIN: CPT | Performed by: INTERNAL MEDICINE

## 2018-10-23 RX ORDER — ALBUTEROL SULFATE 90 UG/1
2 AEROSOL, METERED RESPIRATORY (INHALATION) EVERY 6 HOURS PRN
Qty: 1 INHALER | Refills: 1 | Status: SHIPPED | OUTPATIENT
Start: 2018-10-23 | End: 2019-12-17

## 2018-10-23 ASSESSMENT — PAIN SCALES - GENERAL: PAINLEVEL: NO PAIN (0)
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
## 2018-10-23 NOTE — MR AVS SNAPSHOT
"              After Visit Summary   10/23/2018    Florian Lawrence    MRN: [id number]           Patient Information     Date Of Birth          1955        Visit Information        Provider Department      10/23/2018 2:30 PM Cecilio Pro MD [facility]        Today's Diagnoses     Wheezing-associated respiratory infection (WARI)    -  1    Screening for diabetic retinopathy        Acute bronchitis, unspecified organism           Follow-ups after your visit        Who to contact     If you have questions or need follow up information about today's clinic visit or your schedule please contact [facility] directly at [phone].  Normal or non-critical lab and imaging results will be communicated to you by MyChart, letter or phone within 4 business days after the clinic has received the results. If you do not hear from us within 7 days, please contact the clinic through Quoforehart or phone. If you have a critical or abnormal lab result, we will notify you by phone as soon as possible.  Submit refill requests through Green A or call your pharmacy and they will forward the refill request to us. Please allow 3 business days for your refill to be completed.          Additional Information About Your Visit        MyChart Information     Green A gives you secure access to your electronic health record. If you see a primary care provider, you can also send messages to your care team and make appointments. If you have questions, please call your primary care clinic.  If you do not have a primary care provider, please call [phone] and they will assist you.        Care EveryWhere ID     This is your Care EveryWhere ID. This could be used by other organizations to access your [place] medical records  [id number]        Your Vitals Were     Pulse Temperature Respirations Height Pulse Oximetry BMI (Body Mass Index)    66 96.6  F (35.9  C) (Oral) 20 5' 11\" (1.803 m) 96% 35.84 kg/m2       Blood " Pressure from Last 3 Encounters:   10/23/18 118/72   10/18/18 142/70   09/25/18 144/80    Weight from Last 3 Encounters:   10/23/18 257 lb (116.6 kg)   10/18/18 255 lb 12.8 oz (116 kg)   09/25/18 262 lb (118.8 kg)              Today, you had the following     No orders found for display         Today's Medication Changes          These changes are accurate as of 10/23/18 11:59 PM.  If you have any questions, ask your nurse or doctor.               Start taking these medicines.        Dose/Directions    albuterol 108 (90 Base) MCG/ACT inhaler   Commonly known as:  PROAIR HFA/PROVENTIL HFA/VENTOLIN HFA   Used for:  Acute bronchitis, unspecified organism   Started by:  Cecilio Pro MD        Dose:  2 puff   Inhale 2 puffs into the lungs every 6 hours as needed for shortness of breath / dyspnea or wheezing   Quantity:  1 Inhaler   Refills:  1            Where to get your medicines      These medications were sent to [facility] [address] [place] [address] AT [place] [address], [facility] [address]     Phone:  [phone]     albuterol 108 (90 Base) MCG/ACT inhaler                Primary Care Provider Office Phone # Fax #    Cecilio Pro [phone] [address] [facility] [address]        Equal Access to Services     [facility]: Hadii jez castro hadjazzyo Sotiffanieali, waaxda luqadaha, qaybta kaalmada adeegyada, mary ann shanks. So [facility] [phone].    ATENCIÓN: Si habla español, tiene a scott disposición servicios gratuitos de asistencia lingüística. Llame al [phone].    We comply with applicable federal civil rights laws and [place] laws. We do not discriminate on the basis of race, color, national origin, age, disability, sex, sexual orientation, or gender identity.            Thank you!     Thank you for choosing [facility]  for your care. Our goal is always to provide you with excellent care.  Hearing back from our patients is one way we can continue to improve our services. Please take a few minutes to complete the written survey that you may receive in the mail after your visit with us. Thank you!             Your Updated Medication List - Protect others around you: Learn how to safely use, store and throw away your medicines at www.disposemymeds.org.          This list is accurate as of 10/23/18 11:59 PM.  Always use your most recent med list.                   Brand Name Dispense Instructions for use Diagnosis    albuterol 108 (90 Base) MCG/ACT inhaler    PROAIR HFA/PROVENTIL HFA/VENTOLIN HFA    1 Inhaler    Inhale 2 puffs into the lungs every 6 hours as needed for shortness of breath / dyspnea or wheezing    Acute bronchitis, unspecified organism       aspirin 325 MG EC tablet     100 tablet    Take 1 tablet by mouth daily.    Type 2 diabetes, HbA1c goal < 7% (H)       blood glucose calibration Normal solution     1 Bottle    Use to check blood glucose monitor as needed as directed.    Type 2 diabetes, controlled, with neuropathy (H)       clobetasol 0.05 % cream    TEMOVATE    45 g    Apply two times a day to affected areas until cleared    Eczema, unspecified type       fluticasone 50 MCG/ACT spray    FLONASE    1 Bottle    Spray 1-2 sprays into both nostrils daily    Viral URI with cough       glimepiride 2 MG tablet    AMARYL    90 tablet    Take 1 tablet (2 mg) by mouth every morning (before breakfast)    Controlled type 2 diabetes mellitus with diabetic polyneuropathy, without long-term current use of insulin (H)       ibuprofen 200 MG tablet    ADVIL/MOTRIN     Take 200 mg by mouth every 4 hours as needed        insulin glargine 100 UNIT/ML injection    LANTUS SOLOSTAR    60 mL    Inject 60 Units Subcutaneous At Bedtime Inject 60 units at bedtime. Send patient a 3 month supply    Controlled type 2 diabetes mellitus with diabetic polyneuropathy, without long-term current use of insulin (H)        insulin pen needle 31G X 6 MM    ULTICARE MINI    100 each    Use 1 per day or as directed    Controlled type 2 diabetes mellitus with diabetic polyneuropathy, without long-term current use of insulin (H)       * lisinopril 5 MG tablet    PRINIVIL/ZESTRIL    90 tablet    TAKE 1 TABLET BY MOUTH  DAILY    Hypertension goal BP (blood pressure) < 140/90, Controlled type 2 diabetes mellitus with diabetic polyneuropathy, without long-term current use of insulin (H)       * lisinopril 5 MG tablet    PRINIVIL/ZESTRIL    90 tablet    Take 1 tablet (5 mg) by mouth daily    Hypertension goal BP (blood pressure) < 140/90, Controlled type 2 diabetes mellitus with diabetic polyneuropathy, without long-term current use of insulin (H)       metFORMIN 1000 MG tablet    GLUCOPHAGE    180 tablet    TAKE 1 TABLET BY MOUTH TWO  TIMES DAILY WITH MEALS    Controlled type 2 diabetes mellitus with diabetic polyneuropathy, without long-term current use of insulin (H)       MULTIPLE VITAMIN PO      Take 1 tablet by mouth 2 times daily.        * ONETOUCH ULTRA test strip   Generic drug:  blood glucose monitoring     200 strip    CHECK BLOOD SUGAR TWO TIMES DAILY OR AS DIRECTED    Controlled type 2 diabetes mellitus with diabetic polyneuropathy, without long-term current use of insulin (H)       * blood glucose monitoring test strip    ONETOUCH ULTRA    200 each    1 strip by In Vitro route 2 times daily Use to test blood sugar 2 times daily or as directed.    Controlled type 2 diabetes mellitus with diabetic polyneuropathy, without long-term current use of insulin (H)       order for DME     3 Box    Equipment being ordered: glucostrips for the  one touch ultra 2 glucometer 2 times a day    Type 2 diabetes, controlled, with neuropathy (H)       predniSONE 20 MG tablet    DELTASONE    10 tablet    Take 2 tablets (40 mg) by mouth daily for 5 days    Cough       sildenafil 100 MG tablet    VIAGRA    6 tablet    Take 1 tablet (100 mg) by mouth  daily as needed Take 30 min to 4 hours before intercourse.  Never use with nitroglycerin, terazosin or doxazosin.    Other male erectile dysfunction       * simvastatin 20 MG tablet    ZOCOR    90 tablet    TAKE 1 TABLET BY MOUTH  DAILY    Hyperlipidemia LDL goal <100       * simvastatin 20 MG tablet    ZOCOR    90 tablet    Take 1 tablet (20 mg) by mouth daily    Hyperlipidemia LDL goal <100       vitamin D 400 units tablet      Take 400 Units by mouth 2 times daily.        * Notice:  This list has 6 medication(s) that are the same as other medications prescribed for you. Read the directions carefully, and ask your doctor or other care provider to review them with you.

## 2018-11-15 ENCOUNTER — TRANSFERRED RECORDS (OUTPATIENT)
Dept: HEALTH INFORMATION MANAGEMENT | Facility: CLINIC | Age: 63
End: 2018-11-15

## 2018-11-15 LAB — RETINOPATHY: NEGATIVE

## 2018-12-07 ENCOUNTER — OFFICE VISIT (OUTPATIENT)
Dept: FAMILY MEDICINE | Facility: CLINIC | Age: 63
End: 2018-12-07
Payer: COMMERCIAL

## 2018-12-07 VITALS
SYSTOLIC BLOOD PRESSURE: 118 MMHG | OXYGEN SATURATION: 95 % | TEMPERATURE: 98.2 F | BODY MASS INDEX: 36.32 KG/M2 | WEIGHT: 260.4 LBS | DIASTOLIC BLOOD PRESSURE: 70 MMHG | HEART RATE: 80 BPM

## 2018-12-07 DIAGNOSIS — R22.0 NASAL SWELLING: ICD-10-CM

## 2018-12-07 DIAGNOSIS — R22.0 SWELLING OF FACE: Primary | ICD-10-CM

## 2018-12-07 PROCEDURE — 99213 OFFICE O/P EST LOW 20 MIN: CPT | Performed by: INTERNAL MEDICINE

## 2018-12-07 ASSESSMENT — PAIN SCALES - GENERAL: PAINLEVEL: NO PAIN (0)
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
## 2018-12-07 NOTE — MR AVS SNAPSHOT
After Visit Summary   12/7/2018    Florian Lawrence    MRN: [id number]           Patient Information     Date Of Birth          1955        Visit Information        Provider Department      12/7/2018 2:30 PM Cecilio Pro MD [facility]        Today's Diagnoses     Swelling of face    -  1    Nasal swelling          Care Instructions    [facility]    If you have any questions regarding to your visit please contact your care team:     Team Pink:   Clinic Hours Telephone Number   Internal Medicine:  Dr. Melissa Honeycutt NP 7am-7pm  Monday - Thursday   7am-5pm  Fridays  [phone]  (Appointment scheduling available 24/7)   Urgent Care - [place] - 11am-9pm Monday-Friday Saturday-Sunday- 9am-5pm   [place] - 5pm-9pm Monday-Friday Saturday-Sunday- 9am-5pm  [phone] - [place]  [phone] - [place]       What options do I have for a visit other than an office visit? We offer electronic visits (e-visits) and telephone visits, when medically appropriate.  Please check with your medical insurance to see if these types of visits are covered, as you will be responsible for any charges that are not paid by your insurance.      You can use DataC[employer]d (secure electronic communication) to access to your chart, send your primary care provider a message, or make an appointment. Ask a team member how to get started.     For a price quote for your services, please call our Consumer Price Line at [phone] or our Imaging Cost estimation line at [phone] (for imaging tests).  Daisy KIRKLAND CMA ([facility])            Follow-ups after your visit        Additional Services     ALLERGY/ASTHMA ADULT REFERRAL       Your provider has referred you to: [facility]: [facility] [phone]  [email or web address]    Please be aware that coverage of these services is subject to the terms and  limitations of your health insurance plan.  Call member services at your health plan with any benefit or coverage questions.      Please bring the following with you to your appointment:    (1) Any X-Rays, CTs or MRIs which have been performed.  Contact the facility where they were done to arrange for  prior to your scheduled appointment.    (2) List of current medications  (3) This referral request   (4) Any documents/labs given to you for this referral                  Who to contact     If you have questions or need follow up information about today's clinic visit or your schedule please contact [facility] directly at [phone].  Normal or non-critical lab and imaging results will be communicated to you by MyChart, letter or phone within 4 business days after the clinic has received the results. If you do not hear from us within 7 days, please contact the clinic through Kampylehart or phone. If you have a critical or abnormal lab result, we will notify you by phone as soon as possible.  Submit refill requests through [employer] or call your pharmacy and they will forward the refill request to us. Please allow 3 business days for your refill to be completed.          Additional Information About Your Visit        Kampylehart Information     [employer] gives you secure access to your electronic health record. If you see a primary care provider, you can also send messages to your care team and make appointments. If you have questions, please call your primary care clinic.  If you do not have a primary care provider, please call [phone] and they will assist you.        Care EveryWhere ID     This is your Care EveryWhere ID. This could be used by other organizations to access your [place] medical records  [id number]        Your Vitals Were     Pulse Temperature Pulse Oximetry BMI (Body Mass Index)          80 98.2  F (36.8  C) (Oral) 95% 36.32 kg/m2         Blood Pressure from Last 3 Encounters:    12/07/18 118/70   10/23/18 118/72   10/18/18 142/70    Weight from Last 3 Encounters:   12/07/18 260 lb 6.4 oz (118.1 kg)   10/23/18 257 lb (116.6 kg)   10/18/18 255 lb 12.8 oz (116 kg)              We Performed the Following     ALLERGY/ASTHMA ADULT REFERRAL        Primary Care Provider Office Phone # Fax #    Cecilio Pro [phone] [address] [facility] [address]        Equal Access to Services     [facility]: Hadii aad ku hadasho Soomaali, waaxda luqadaha, qaybta kaalmada adeegyada, mary ann gonzalez haybelén webb . So [facility] [phone].    ATENCIÓN: Si habla español, tiene a scott disposición servicios gratuitos de asistencia lingüística. [facility] [phone].    We comply with applicable federal civil rights laws and [place] laws. We do not discriminate on the basis of race, color, national origin, age, disability, sex, sexual orientation, or gender identity.            Thank you!     Thank you for choosing [facility]  for your care. Our goal is always to provide you with excellent care. Hearing back from our patients is one way we can continue to improve our services. Please take a few minutes to complete the written survey that you may receive in the mail after your visit with us. Thank you!             Your Updated Medication List - Protect others around you: Learn how to safely use, store and throw away your medicines at www.disposemymeds.org.          This list is accurate as of 12/7/18  3:09 PM.  Always use your most recent med list.                   Brand Name Dispense Instructions for use Diagnosis    albuterol 108 (90 Base) MCG/ACT inhaler    PROAIR HFA/PROVENTIL HFA/VENTOLIN HFA    1 Inhaler    Inhale 2 puffs into the lungs every 6 hours as needed for shortness of breath / dyspnea or wheezing    Acute bronchitis, unspecified organism       aspirin 325 MG EC tablet    ASA    100 tablet    Take 1 tablet by mouth daily.    Type 2 diabetes,  HbA1c goal < 7% (H)       blood glucose calibration Normal solution     1 Bottle    Use to check blood glucose monitor as needed as directed.    Type 2 diabetes, controlled, with neuropathy (H)       clobetasol 0.05 % external cream    TEMOVATE    45 g    Apply two times a day to affected areas until cleared    Eczema, unspecified type       fluticasone 50 MCG/ACT nasal spray    FLONASE    1 Bottle    Spray 1-2 sprays into both nostrils daily    Viral URI with cough       glimepiride 2 MG tablet    AMARYL    90 tablet    Take 1 tablet (2 mg) by mouth every morning (before breakfast)    Controlled type 2 diabetes mellitus with diabetic polyneuropathy, without long-term current use of insulin (H)       ibuprofen 200 MG tablet    ADVIL/MOTRIN     Take 200 mg by mouth every 4 hours as needed        insulin glargine 100 UNIT/ML pen    LANTUS SOLOSTAR    60 mL    Inject 60 Units Subcutaneous At Bedtime Inject 60 units at bedtime. Send patient a 3 month supply    Controlled type 2 diabetes mellitus with diabetic polyneuropathy, without long-term current use of insulin (H)       insulin pen needle 31G X 6 MM miscellaneous    ULTICARE MINI    100 each    Use 1 per day or as directed    Controlled type 2 diabetes mellitus with diabetic polyneuropathy, without long-term current use of insulin (H)       * lisinopril 5 MG tablet    PRINIVIL/ZESTRIL    90 tablet    TAKE 1 TABLET BY MOUTH  DAILY    Hypertension goal BP (blood pressure) < 140/90, Controlled type 2 diabetes mellitus with diabetic polyneuropathy, without long-term current use of insulin (H)       * lisinopril 5 MG tablet    PRINIVIL/ZESTRIL    90 tablet    Take 1 tablet (5 mg) by mouth daily    Hypertension goal BP (blood pressure) < 140/90, Controlled type 2 diabetes mellitus with diabetic polyneuropathy, without long-term current use of insulin (H)       metFORMIN 1000 MG tablet    GLUCOPHAGE    180 tablet    TAKE 1 TABLET BY MOUTH TWO  TIMES DAILY WITH MEALS     Controlled type 2 diabetes mellitus with diabetic polyneuropathy, without long-term current use of insulin (H)       MULTIPLE VITAMIN PO      Take 1 tablet by mouth 2 times daily.        * ONETOUCH ULTRA test strip   Generic drug:  blood glucose monitoring     200 strip    CHECK BLOOD SUGAR TWO TIMES DAILY OR AS DIRECTED    Controlled type 2 diabetes mellitus with diabetic polyneuropathy, without long-term current use of insulin (H)       * blood glucose monitoring test strip    ONETOUCH ULTRA    200 each    1 strip by In Vitro route 2 times daily Use to test blood sugar 2 times daily or as directed.    Controlled type 2 diabetes mellitus with diabetic polyneuropathy, without long-term current use of insulin (H)       order for DME     3 Box    Equipment being ordered: glucostrips for the  one touch ultra 2 glucometer 2 times a day    Type 2 diabetes, controlled, with neuropathy (H)       sildenafil 100 MG tablet    VIAGRA    6 tablet    Take 1 tablet (100 mg) by mouth daily as needed Take 30 min to 4 hours before intercourse.  Never use with nitroglycerin, terazosin or doxazosin.    Other male erectile dysfunction       * simvastatin 20 MG tablet    ZOCOR    90 tablet    TAKE 1 TABLET BY MOUTH  DAILY    Hyperlipidemia LDL goal <100       * simvastatin 20 MG tablet    ZOCOR    90 tablet    Take 1 tablet (20 mg) by mouth daily    Hyperlipidemia LDL goal <100       vitamin D 400 units tablet      Take 400 Units by mouth 2 times daily.        * Notice:  This list has 6 medication(s) that are the same as other medications prescribed for you. Read the directions carefully, and ask your doctor or other care provider to review them with you.

## 2018-12-07 NOTE — PATIENT INSTRUCTIONS
[facility]    If you have any questions regarding to your visit please contact your care team:     Team Pink:   Clinic Hours Telephone Number   Internal Medicine:  Dr. Melissa Honeycutt NP 7am-7pm  Monday - Thursday   7am-5pm  Fridays  [phone]  (Appointment scheduling available 24/7)   Urgent Care - [place] and [facility] - 11am-9pm Monday-Friday Saturday-Sunday- 9am-5pm   [place] - 5pm-9pm Monday-Friday Saturday-Sunday- 9am-5pm  [phone] - [place]  [phone] - [place]       What options do I have for a visit other than an office visit? We offer electronic visits (e-visits) and telephone visits, when medically appropriate.  Please check with your medical insurance to see if these types of visits are covered, as you will be responsible for any charges that are not paid by your insurance.      You can use [employer] (secure electronic communication) to access to your chart, send your primary care provider a message, or make an appointment. Ask a team member how to get started.     For a price quote for your services, please call our Consumer Price Line at [phone] or our Imaging Cost estimation line at [phone] (for imaging tests).  Daisy KIRKLAND CMA ([facility])

## 2018-12-10 ENCOUNTER — OFFICE VISIT (OUTPATIENT)
Dept: ALLERGY | Facility: CLINIC | Age: 63
End: 2018-12-10
Payer: COMMERCIAL

## 2018-12-10 VITALS
WEIGHT: 257 LBS | HEART RATE: 90 BPM | RESPIRATION RATE: 14 BRPM | SYSTOLIC BLOOD PRESSURE: 122 MMHG | OXYGEN SATURATION: 98 % | DIASTOLIC BLOOD PRESSURE: 67 MMHG | TEMPERATURE: 97.2 F | BODY MASS INDEX: 35.84 KG/M2

## 2018-12-10 DIAGNOSIS — T78.3XXA ANGIOEDEMA, INITIAL ENCOUNTER: Primary | ICD-10-CM

## 2018-12-10 DIAGNOSIS — Z88.0 PENICILLIN ALLERGY: ICD-10-CM

## 2018-12-10 LAB
ALBUMIN SERPL-MCNC: 3.4 G/DL (ref 3.4–5)
ALP SERPL-CCNC: 52 U/L (ref 40–150)
ALT SERPL W P-5'-P-CCNC: 30 U/L (ref 0–70)
ANION GAP SERPL CALCULATED.3IONS-SCNC: 9 MMOL/L (ref 3–14)
AST SERPL W P-5'-P-CCNC: 29 U/L (ref 0–45)
BASOPHILS # BLD AUTO: 0 10E9/L (ref 0–0.2)
BASOPHILS NFR BLD AUTO: 0.7 %
BILIRUB SERPL-MCNC: 0.7 MG/DL (ref 0.2–1.3)
BUN SERPL-MCNC: 17 MG/DL (ref 7–30)
CALCIUM SERPL-MCNC: 8.6 MG/DL (ref 8.5–10.1)
CHLORIDE SERPL-SCNC: 106 MMOL/L (ref 94–109)
CO2 SERPL-SCNC: 23 MMOL/L (ref 20–32)
CREAT SERPL-MCNC: 0.85 MG/DL (ref 0.66–1.25)
DIFFERENTIAL METHOD BLD: NORMAL
EOSINOPHIL # BLD AUTO: 0.1 10E9/L (ref 0–0.7)
EOSINOPHIL NFR BLD AUTO: 2.8 %
ERYTHROCYTE [DISTWIDTH] IN BLOOD BY AUTOMATED COUNT: 13.4 % (ref 10–15)
GFR SERPL CREATININE-BSD FRML MDRD: >90 ML/MIN/1.7M2
GLUCOSE SERPL-MCNC: 229 MG/DL (ref 70–99)
HCT VFR BLD AUTO: 41 % (ref 40–53)
HGB BLD-MCNC: 14.6 G/DL (ref 13.3–17.7)
LYMPHOCYTES # BLD AUTO: 0.8 10E9/L (ref 0.8–5.3)
LYMPHOCYTES NFR BLD AUTO: 18.5 %
MCH RBC QN AUTO: 31.6 PG (ref 26.5–33)
MCHC RBC AUTO-ENTMCNC: 35.6 G/DL (ref 31.5–36.5)
MCV RBC AUTO: 89 FL (ref 78–100)
MONOCYTES # BLD AUTO: 0.4 10E9/L (ref 0–1.3)
MONOCYTES NFR BLD AUTO: 9.7 %
NEUTROPHILS # BLD AUTO: 3 10E9/L (ref 1.6–8.3)
NEUTROPHILS NFR BLD AUTO: 68.3 %
PLATELET # BLD AUTO: 199 10E9/L (ref 150–450)
POTASSIUM SERPL-SCNC: 4.2 MMOL/L (ref 3.4–5.3)
PROT SERPL-MCNC: 7.5 G/DL (ref 6.8–8.8)
RBC # BLD AUTO: 4.62 10E12/L (ref 4.4–5.9)
SODIUM SERPL-SCNC: 138 MMOL/L (ref 133–144)
TSH SERPL DL<=0.005 MIU/L-ACNC: 1.84 MU/L (ref 0.4–4)
WBC # BLD AUTO: 4.3 10E9/L (ref 4–11)

## 2018-12-10 PROCEDURE — 99000 SPECIMEN HANDLING OFFICE-LAB: CPT | Performed by: ALLERGY & IMMUNOLOGY

## 2018-12-10 PROCEDURE — 85025 COMPLETE CBC W/AUTO DIFF WBC: CPT | Performed by: ALLERGY & IMMUNOLOGY

## 2018-12-10 PROCEDURE — 99204 OFFICE O/P NEW MOD 45 MIN: CPT | Performed by: ALLERGY & IMMUNOLOGY

## 2018-12-10 PROCEDURE — 86160 COMPLEMENT ANTIGEN: CPT | Performed by: ALLERGY & IMMUNOLOGY

## 2018-12-10 PROCEDURE — 36415 COLL VENOUS BLD VENIPUNCTURE: CPT | Performed by: ALLERGY & IMMUNOLOGY

## 2018-12-10 PROCEDURE — 84443 ASSAY THYROID STIM HORMONE: CPT | Performed by: ALLERGY & IMMUNOLOGY

## 2018-12-10 PROCEDURE — 80053 COMPREHEN METABOLIC PANEL: CPT | Performed by: ALLERGY & IMMUNOLOGY

## 2018-12-10 PROCEDURE — 86160 COMPLEMENT ANTIGEN: CPT | Mod: 90 | Performed by: ALLERGY & IMMUNOLOGY

## 2018-12-10 PROCEDURE — 86161 COMPLEMENT/FUNCTION ACTIVITY: CPT | Mod: 90 | Performed by: ALLERGY & IMMUNOLOGY
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
## 2018-12-10 NOTE — Clinical Note
Brenda Pro-I saw Florian this morning. I believe his recurrent angioedema is actually secondary to his lisinopril. I advised him to stop the medication and ordered some additional lab work. It is safe for him to try an ARB as a replacement.Edmond

## 2018-12-10 NOTE — PATIENT INSTRUCTIONS
If you have any questions regarding your allergies, asthma, or what we discussed during your visit today please call the allergy clinic or contact us via [employer].    Maury Pitts/[facility]: [phone]      Your swelling is most likely related to the lisinopril. I would like you to stop this medication. You may have recurrent episodes that persist for the next few weeks to a couple of months.    I will contact Dr. Pro about switching your lisinopril to another medication - reach out to him and his team if you haven't heard from them in the next few days.

## 2018-12-12 LAB
C1INH FUNCTIONAL/C1INH TOTAL MFR SERPL: 89 %
C1INH SERPL-MCNC: 27 MG/DL (ref 21–39)
C4 SERPL-MCNC: 18 MG/DL (ref 15–50)

## 2018-12-14 ENCOUNTER — MYC MEDICAL ADVICE (OUTPATIENT)
Dept: FAMILY MEDICINE | Facility: CLINIC | Age: 63
End: 2018-12-14

## 2018-12-14 DIAGNOSIS — I10 HYPERTENSION GOAL BP (BLOOD PRESSURE) < 140/90: Primary | ICD-10-CM

## 2018-12-14 DIAGNOSIS — E11.42 CONTROLLED TYPE 2 DIABETES MELLITUS WITH DIABETIC POLYNEUROPATHY, WITHOUT LONG-TERM CURRENT USE OF INSULIN (H): ICD-10-CM

## 2018-12-17 RX ORDER — LOSARTAN POTASSIUM 25 MG/1
25 TABLET ORAL DAILY
Qty: 30 TABLET | Refills: 1 | Status: SHIPPED | OUTPATIENT
Start: 2018-12-17 | End: 2018-12-27

## 2018-12-17 RX ORDER — INSULIN GLARGINE 100 [IU]/ML
INJECTION, SOLUTION SUBCUTANEOUS
Qty: 60 ML | Refills: 0 | Status: SHIPPED | OUTPATIENT
Start: 2018-12-17 | End: 2019-06-20

## 2018-12-17 NOTE — TELEPHONE ENCOUNTER
See Recovers message     1. Make sure lisinopril is discontinued from medication administration record   2. Send in losartan ( Cozaar ) 25 milligrams  - 30 pills with refill. If patient has zero issues with this [ as I suspect ] we can extend this to 90 days supplies    Cecilio Pro MD

## 2018-12-18 LAB — C1Q SERPL-MCNC: 138 UG/ML (ref 109–242)

## 2018-12-27 DIAGNOSIS — I10 HYPERTENSION GOAL BP (BLOOD PRESSURE) < 140/90: ICD-10-CM

## 2018-12-28 RX ORDER — LOSARTAN POTASSIUM 25 MG/1
TABLET ORAL
Qty: 60 TABLET | Refills: 3 | Status: SHIPPED | OUTPATIENT
Start: 2018-12-28 | End: 2019-02-14

## 2019-02-05 ENCOUNTER — ANCILLARY PROCEDURE (OUTPATIENT)
Dept: GENERAL RADIOLOGY | Facility: CLINIC | Age: 64
End: 2019-02-05
Payer: COMMERCIAL

## 2019-02-05 ENCOUNTER — OFFICE VISIT (OUTPATIENT)
Dept: ORTHOPEDICS | Facility: CLINIC | Age: 64
End: 2019-02-05
Payer: COMMERCIAL

## 2019-02-05 VITALS
HEART RATE: 69 BPM | OXYGEN SATURATION: 96 % | SYSTOLIC BLOOD PRESSURE: 125 MMHG | WEIGHT: 260 LBS | BODY MASS INDEX: 36.4 KG/M2 | DIASTOLIC BLOOD PRESSURE: 58 MMHG | HEIGHT: 71 IN

## 2019-02-05 DIAGNOSIS — M12.811 ROTATOR CUFF ARTHROPATHY, RIGHT: ICD-10-CM

## 2019-02-05 DIAGNOSIS — S40.011A CONTUSION OF RIGHT SHOULDER, INITIAL ENCOUNTER: ICD-10-CM

## 2019-02-05 DIAGNOSIS — M75.101 TEAR OF RIGHT ROTATOR CUFF, UNSPECIFIED TEAR EXTENT: Primary | ICD-10-CM

## 2019-02-05 DIAGNOSIS — M19.011 OSTEOARTHRITIS OF RIGHT GLENOHUMERAL JOINT: ICD-10-CM

## 2019-02-05 PROCEDURE — 73030 X-RAY EXAM OF SHOULDER: CPT | Mod: RT

## 2019-02-05 PROCEDURE — 99203 OFFICE O/P NEW LOW 30 MIN: CPT | Performed by: ORTHOPAEDIC SURGERY

## 2019-02-05 ASSESSMENT — MIFFLIN-ST. JEOR: SCORE: 1996.48
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
## 2019-02-07 ENCOUNTER — TELEPHONE (OUTPATIENT)
Dept: FAMILY MEDICINE | Facility: CLINIC | Age: 64
End: 2019-02-07

## 2019-02-07 NOTE — TELEPHONE ENCOUNTER
Spoke with patient he was seen on 2/5/19 by Ortho after a fall on 2/4/19.  He was having pain across both shoulders at visit but was having difficulty moving right arm so X-rays were taken for right shoulder area. Since visit he was felt like his strength was getting better.  As pain on right side was getting better he had worsening pain on left side.  The pain is constant 3/10 during the day but with certain movements he will feel a sharp pain in left shoulder rates pain at 7/10 when this happens.   He has tried icing, this helped some, and Aleve and Advil but these do not help the sharp pain he is having.   Please advise   Dina Raymond RN

## 2019-02-07 NOTE — TELEPHONE ENCOUNTER
Was seen by ortho on 2/5/19. The fall and back pain was mentioned at the OV    Left message on voicemail for patient to return call to RN hotline at # [phone].    Odette Madden RN

## 2019-02-07 NOTE — TELEPHONE ENCOUNTER
Reason for call:  Patient reporting a symptom    Symptom or request: back pain.     Duration (how long have symptoms been present):  Monday.     Have you been treated for this before? No    Additional comments:  Patient fell. He saw dr chaudhari. He is now having back pain. Please call and advise.     Phone Number patient can be reached at:  Home number on file [phone] (home)    Best Time:   Any     Can we leave a detailed message on this number:  YES    Call taken on 2/7/2019 at 7:38 AM by Marilyn Cruz

## 2019-02-07 NOTE — TELEPHONE ENCOUNTER
Reason for call:  Other   Patient called regarding (reason for call): call back  Additional comments: Patient would like a call back - pain is getting worse throughout shoulders into left shoulder/ sharp localized pain.    Phone number to reach patient:  Cell number on file:    Telephone Information:   Mobile [phone]       Best Time:  ASAP    Can we leave a detailed message on this number?  YES  3

## 2019-02-08 ENCOUNTER — TELEPHONE (OUTPATIENT)
Dept: ORTHOPEDICS | Facility: CLINIC | Age: 64
End: 2019-02-08

## 2019-02-08 DIAGNOSIS — M75.120 COMPLETE TEAR OF ROTATOR CUFF, UNSPECIFIED LATERALITY: Primary | ICD-10-CM

## 2019-02-08 RX ORDER — HYDROCODONE BITARTRATE AND ACETAMINOPHEN 5; 325 MG/1; MG/1
1 TABLET ORAL EVERY 6 HOURS PRN
Qty: 20 TABLET | Refills: 0 | Status: SHIPPED | OUTPATIENT
Start: 2019-02-08 | End: 2019-02-14

## 2019-02-08 NOTE — TELEPHONE ENCOUNTER
Patient called and left  on RN Hotline.   Patient states he was in on 2/5/2019 for a fall and was asked if he wanted any pain medication.   Patient initially declined pain medications but stated he is still in pain and wondering if he can now have pain medications.     Janet Lee RN

## 2019-02-08 NOTE — TELEPHONE ENCOUNTER
"Advised patient Florian a prescription for:    HYDROcodone-acetaminophen (NORCO) 5-325 MG tablet 20 tablet 0 2/8/2019  --   Sig - Route: Take 1 tablet by mouth every 6 hours as needed for pain - Oral     Is at the [facility] desk for . Signs and symptoms of concern discussed. He will consider UC.ED if pain increases. I asked he call if no improvement over weekend and we will have him return for exam. He states he can use arm in general arc of motion but gets \"sharp\" pain medial aspect of mid scapula.  N other questions or concerns. Henok Du picked up envelope 2-8-19  "

## 2019-02-08 NOTE — TELEPHONE ENCOUNTER
Patient called back RN hotline stating he is in constant pain and pain is sharp.  This is making it very hard for him to sleep.  He was seen by Dr. Curry on 2/5/19 and declined pain medication at visit as he thought he wouldn't need it.  Patient is wondering if he can have something for the pain.  Patient was seen for a right shoulder injury.  Please advise   Dina Raymond RN

## 2019-02-11 ENCOUNTER — TELEPHONE (OUTPATIENT)
Dept: ORTHOPEDICS | Facility: CLINIC | Age: 64
End: 2019-02-11

## 2019-02-11 NOTE — TELEPHONE ENCOUNTER
Please advise if ok for referral to a specialist or if he needs seen by PCP first Odette Madden RN

## 2019-02-11 NOTE — TELEPHONE ENCOUNTER
Patient is calling back looking to see if he can get a referrel to see a orthopedic that see's back pain. Please call back

## 2019-02-11 NOTE — TELEPHONE ENCOUNTER
Reason for Call:  Other call back    Detailed comments: Patient was seen 2-5-19 for shoulder/arm pain. Patient is now experiencing back pain and would like to see you. Patient would like to know if he should see you or someone else.    Phone Number Patient can be reached at: Home number on file [phone] (home)    Best Time: any    Can we leave a detailed message on this number? YES    Call taken on 2/11/2019 at 7:14 AM by Carlie Holt

## 2019-02-11 NOTE — TELEPHONE ENCOUNTER
Please get more details / more history. Probably he should see me first. We have to determine if his case warrants a lumbar mri evaluation and or other radiographic image, and does he need sports medicine consultation or neurosurgical consultation or orthopedic consultation , what are his treatment options etc. Unfortunately I only have a 1/2 day clinic tomorrow and will be back in the office a week from this Thursday     Perhaps he could see one of my partners , Amanda Honeycutt, [facility] nurse practitioner  Or Dr Melissa Malin ?    Cecilio Pro MD

## 2019-02-11 NOTE — TELEPHONE ENCOUNTER
Please notify patient and ask him to f/u with Dr. Pro or one of his partners for further eval and possible imaging first  Then we can determine proper treatment or what type of specialist he should see    Odette Madden RN
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
## 2019-02-14 ENCOUNTER — OFFICE VISIT (OUTPATIENT)
Dept: FAMILY MEDICINE | Facility: CLINIC | Age: 64
End: 2019-02-14
Payer: COMMERCIAL

## 2019-02-14 ENCOUNTER — ANCILLARY PROCEDURE (OUTPATIENT)
Dept: GENERAL RADIOLOGY | Facility: CLINIC | Age: 64
End: 2019-02-14
Attending: NURSE PRACTITIONER
Payer: COMMERCIAL

## 2019-02-14 VITALS
RESPIRATION RATE: 20 BRPM | DIASTOLIC BLOOD PRESSURE: 74 MMHG | BODY MASS INDEX: 35.9 KG/M2 | OXYGEN SATURATION: 96 % | SYSTOLIC BLOOD PRESSURE: 144 MMHG | HEART RATE: 74 BPM | WEIGHT: 256.4 LBS | HEIGHT: 71 IN | TEMPERATURE: 97.8 F

## 2019-02-14 DIAGNOSIS — M54.6 ACUTE MIDLINE THORACIC BACK PAIN: ICD-10-CM

## 2019-02-14 DIAGNOSIS — I10 HYPERTENSION GOAL BP (BLOOD PRESSURE) < 140/90: ICD-10-CM
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
## 2019-02-14 DIAGNOSIS — M75.120 COMPLETE TEAR OF ROTATOR CUFF, UNSPECIFIED LATERALITY: ICD-10-CM

## 2019-02-14 PROCEDURE — 72072 X-RAY EXAM THORAC SPINE 3VWS: CPT

## 2019-02-14 PROCEDURE — 72100 X-RAY EXAM L-S SPINE 2/3 VWS: CPT

## 2019-02-14 PROCEDURE — 99213 OFFICE O/P EST LOW 20 MIN: CPT | Performed by: NURSE PRACTITIONER

## 2019-02-14 RX ORDER — LOSARTAN POTASSIUM 25 MG/1
25 TABLET ORAL DAILY
Qty: 60 TABLET | Refills: 3 | Status: SHIPPED | OUTPATIENT
Start: 2019-02-14 | End: 2019-04-11

## 2019-02-14 RX ORDER — HYDROCODONE BITARTRATE AND ACETAMINOPHEN 5; 325 MG/1; MG/1
1 TABLET ORAL EVERY 6 HOURS PRN
Qty: 20 TABLET | Refills: 0 | Status: SHIPPED | OUTPATIENT
Start: 2019-02-14 | End: 2019-12-17

## 2019-02-14 RX ORDER — METHOCARBAMOL 500 MG/1
500-1000 TABLET, FILM COATED ORAL 3 TIMES DAILY PRN
Qty: 30 TABLET | Refills: 1 | Status: SHIPPED | OUTPATIENT
Start: 2019-02-14 | End: 2019-12-17

## 2019-02-14 RX ORDER — LOSARTAN POTASSIUM 25 MG/1
25 TABLET ORAL DAILY
Qty: 60 TABLET | Refills: 3 | Status: CANCELLED | OUTPATIENT
Start: 2019-02-14

## 2019-02-14 ASSESSMENT — MIFFLIN-ST. JEOR: SCORE: 1980.15

## 2019-02-14 ASSESSMENT — PAIN SCALES - GENERAL: PAINLEVEL: MODERATE PAIN (4)

## 2019-02-14 NOTE — PATIENT INSTRUCTIONS
[facility]    If you have any questions regarding to your visit please contact your care team:     Team Pink:   Clinic Hours Telephone Number   Internal Medicine:  Dr. Melissa Honeycutt NP 7am-7pm  Monday - Thursday   7am-5pm  Fridays  [phone]  (Appointment scheduling available 24/7)   Urgent Care - [place] and [facility] - 11am-9pm Monday-Friday Saturday-Sunday- 9am-5pm   [place] - 5pm-9pm Monday-Friday Saturday-Sunday- 9am-5pm  [phone] - [place]  [phone] - [place]       What options do I have for a visit other than an office visit? We offer electronic visits (e-visits) and telephone visits, when medically appropriate.  Please check with your medical insurance to see if these types of visits are covered, as you will be responsible for any charges that are not paid by your insurance.      You can use [employer] (secure electronic communication) to access to your chart, send your primary care provider a message, or make an appointment. Ask a team member how to get started.     For a price quote for your services, please call our Consumer Price Line at [phone] or our Imaging Cost estimation line at [phone] (for imaging tests).  Jeannie GONZALEZ CMA

## 2019-02-26 ENCOUNTER — OFFICE VISIT (OUTPATIENT)
Dept: ORTHOPEDICS | Facility: CLINIC | Age: 64
End: 2019-02-26
Payer: COMMERCIAL

## 2019-02-26 VITALS — OXYGEN SATURATION: 95 % | SYSTOLIC BLOOD PRESSURE: 145 MMHG | HEART RATE: 82 BPM | DIASTOLIC BLOOD PRESSURE: 74 MMHG

## 2019-02-26 DIAGNOSIS — R07.81 RIB PAIN ON RIGHT SIDE: ICD-10-CM

## 2019-02-26 DIAGNOSIS — M19.011 OSTEOARTHRITIS OF RIGHT GLENOHUMERAL JOINT: ICD-10-CM

## 2019-02-26 DIAGNOSIS — M75.101 TEAR OF RIGHT ROTATOR CUFF, UNSPECIFIED TEAR EXTENT: Primary | ICD-10-CM

## 2019-02-26 PROCEDURE — 99213 OFFICE O/P EST LOW 20 MIN: CPT | Performed by: ORTHOPAEDIC SURGERY

## 2019-02-26 ASSESSMENT — PAIN SCALES - GENERAL: PAINLEVEL: SEVERE PAIN (6)

## 2019-02-26 NOTE — LETTER
2/26/2019         RE: Florian Lawrence  [address] [address]        Dear Colleague,    Thank you for referring your patient, Florian Lawrence, to the [facility]. Please see a copy of my visit note below.    SUBJECTIVE:  Florian Lawrence is here in follow up of  Right shoulder pain, history of proximal humerus fracture 2011, with new injury from a fall 3 weeks ago. Possible new vs old rotator cuff tear. Posttraumatic osteoarthritis of the glenohumeral joint. Here for re-exam.     Present symptoms: The pain around his shoulder and associated weakness has improved. Continues to have pain in the upper back between shoulder blades, pain with coughing or sneezing since the injury. Slow improvement. Takes 1 tablet of hydrocodone before bed. States that pain is a constant 1/10.  Treatment up to this point: Pain medication, rest    Review of Systems:  Constitutional/General: Negative for fever, chills, change in weight  Integumentary/Skin: Negative for worrisome rashes, moles, or lesions  Neuro: Negative for weakness, dizziness, or paresthesias   Psychiatric: negative for changes in mood or affect    OBJECTIVE:  Physical Exam:  /74 (BP Location: Left arm, Patient Position: Sitting, Cuff Size: Adult Regular)   Pulse 82   SpO2 95%   General Appearance: healthy, alert and no distress   Skin: no suspicious lesions or rashes  Neuro: Normal strength and tone, mentation intact and speech normal  Vascular: good pulses, and capillary refill   Lymph: no lymphadenopathy   Psych:  mentation appears normal and affect normal/bright  Resp: no increased work of breathing    Neck Exam:  Cervical range of motion: Reduced, but does not cause neck pain or reproduce shoulder pain.     Right Shoulder Exam:  Shoulder Inspection: Mild kyphosis of thoracic spine. No scapular thoracic crepitations.   Tender: mild over rhomboids and thoracic paraspinal muscles. Main area of discomfort is underneath the  scapula  Non-tender: Anterior rib cage, Posterior lower ribs.   Range of Motion:   Active: forward flexion: 140, internal rotation: L3   Passive: forward flexion: Full, external rotation: 40  Strength: forward flexion: 5/5, external rotation: 4+/5, Liftoff: Able      XRAY  Obtained 2/5/19 of the Right Shoulder: 3-views, reviewed in the office with the patient by myself today and show no obvious ribs fractures.  Obtained 2/14/19 of the Thoracic and Lumbar spine: 3-views, reviewed in the office with the patient by myself today and show no evidence of fracture, but the entire rib cage is not in view.            ASSESSMENT:  1. Scapulothoracic pain, possible rib contusion or occult rib fracture.   2. Acute shoulder pain is back to baseline, possible small right rotator cuff tear.   3. OA of right glenohumeral joint.     PLAN:  Can try massage/ physical therapy modalities, but wait another 3 weeks before attempting.   Offered to refer to physical therapy today, but patient defers at this time.   Return to clinic if back, chest or shoulder pain worsens. Signs discussed which could indicate a vascular problem, or pneumonia.    If he is still having problems with the mid-back area in 3 weeks, I suggested he call and we will arrange PT.     MARITA Curry MD  Dept. Orthopedic Surgery  [facility]    This document serves as a record of the services and decisions personally performed and made by Dr. MARITA Curry MD. It was created on his behalf by Jaleesa Mejía, a trained medical scribe. The creation of this record is based on the provider's personal observations and the statements of the patient. This document has been checked and approved by the attending provider.   Jaleesa Mejía February 26, 2019 8:15 AM    Again, thank you for allowing me to participate in the care of your patient.        Sincerely,        Atul Curry MD

## 2019-02-26 NOTE — PROGRESS NOTES
SUBJECTIVE:  Florian Lawrence is here in follow up of  Right shoulder pain, history of proximal humerus fracture 2011, with new injury from a fall 3 weeks ago. Possible new vs old rotator cuff tear. Posttraumatic osteoarthritis of the glenohumeral joint. Here for re-exam.     Present symptoms: The pain around his shoulder and associated weakness has improved. Continues to have pain in the upper back between shoulder blades, pain with coughing or sneezing since the injury. Slow improvement. Takes 1 tablet of hydrocodone before bed. States that pain is a constant 1/10.  Treatment up to this point: Pain medication, rest    Review of Systems:  Constitutional/General: Negative for fever, chills, change in weight  Integumentary/Skin: Negative for worrisome rashes, moles, or lesions  Neuro: Negative for weakness, dizziness, or paresthesias   Psychiatric: negative for changes in mood or affect    OBJECTIVE:  Physical Exam:  /74 (BP Location: Left arm, Patient Position: Sitting, Cuff Size: Adult Regular)   Pulse 82   SpO2 95%   General Appearance: healthy, alert and no distress   Skin: no suspicious lesions or rashes  Neuro: Normal strength and tone, mentation intact and speech normal  Vascular: good pulses, and capillary refill   Lymph: no lymphadenopathy   Psych:  mentation appears normal and affect normal/bright  Resp: no increased work of breathing    Neck Exam:  Cervical range of motion: Reduced, but does not cause neck pain or reproduce shoulder pain.     Right Shoulder Exam:  Shoulder Inspection: Mild kyphosis of thoracic spine. No scapular thoracic crepitations.   Tender: mild over rhomboids and thoracic paraspinal muscles. Main area of discomfort is underneath the scapula  Non-tender: Anterior rib cage, Posterior lower ribs.   Range of Motion:   Active: forward flexion: 140, internal rotation: L3   Passive: forward flexion: Full, external rotation: 40  Strength: forward flexion: 5/5, external rotation: 4+/5,  Liftoff: Able      XRAY  Obtained 2/5/19 of the Right Shoulder: 3-views, reviewed in the office with the patient by myself today and show no obvious ribs fractures.  Obtained 2/14/19 of the Thoracic and Lumbar spine: 3-views, reviewed in the office with the patient by myself today and show no evidence of fracture, but the entire rib cage is not in view.            ASSESSMENT:  1. Scapulothoracic pain, possible rib contusion or occult rib fracture.   2. Acute shoulder pain is back to baseline, possible small right rotator cuff tear.   3. OA of right glenohumeral joint.     PLAN:  Can try massage/ physical therapy modalities, but wait another 3 weeks before attempting.   Offered to refer to physical therapy today, but patient defers at this time.   Return to clinic if back, chest or shoulder pain worsens. Signs discussed which could indicate a vascular problem, or pneumonia.    If he is still having problems with the mid-back area in 3 weeks, I suggested he call and we will arrange PT.     MARITA Curry MD  Dept. Orthopedic Surgery  [facility]    This document serves as a record of the services and decisions personally performed and made by Dr. MARITA Curry MD. It was created on his behalf by Jaleesa Mejía, a trained medical scribe. The creation of this record is based on the provider's personal observations and the statements of the patient. This document has been checked and approved by the attending provider.   Jaleesa Mejía February 26, 2019 8:15 AM

## 2019-04-05 ENCOUNTER — MYC MEDICAL ADVICE (OUTPATIENT)
Dept: FAMILY MEDICINE | Facility: CLINIC | Age: 64
End: 2019-04-05

## 2019-04-05 DIAGNOSIS — E11.42 CONTROLLED TYPE 2 DIABETES MELLITUS WITH DIABETIC POLYNEUROPATHY, WITHOUT LONG-TERM CURRENT USE OF INSULIN (H): ICD-10-CM

## 2019-04-08 NOTE — TELEPHONE ENCOUNTER
It has been 6 full months since our last office visit and patient is due for a follow up appointment regarding his diabetes mellitus. We can extend all refills but only for one month. Please help see to it that appointment is generated . We can't do this again. Further refill requests without appointment should be declined.    Lab Results   Component Value Date    A1C 6.9 09/25/2018    A1C 6.9 02/12/2018    A1C 6.6 06/09/2017    A1C 7.1 01/27/2017    A1C 6.8 06/27/2016         Cecilio Pro MD

## 2019-04-08 NOTE — TELEPHONE ENCOUNTER
"Routing refill request to provider for review/approval because:  Labs out of range:  BP  Labs not current:  A1C  Patient has appointment scheduled for 4/11/2019    Requested Prescriptions   Pending Prescriptions Disp Refills     metFORMIN (GLUCOPHAGE) 1000 MG tablet 180 tablet 3     Sig: TAKE 1 TABLET BY MOUTH TWO  TIMES DAILY WITH MEALS       Biguanide Agents Failed - 4/8/2019  7:55 AM        Failed - Blood pressure less than 140/90 in past 6 months     BP Readings from Last 3 Encounters:   02/26/19 145/74   02/14/19 144/74   02/05/19 125/58                 Failed - Patient has documented A1c within the specified period of time.     If HgbA1C is 8 or greater, it needs to be on file within the past 3 months.  If less than 8, must be on file within the past 6 months.     Recent Labs   Lab Test 09/25/18  0901   A1C 6.9*             Passed - Patient has documented LDL within the past 12 mos.     Recent Labs   Lab Test 09/25/18  0901   LDL 18             Passed - Patient has had a Microalbumin in the past 15 mos.     Recent Labs   Lab Test 02/12/18  1645   MICROL 26   UMALCR 16.25             Passed - Patient is age 10 or older        Passed - Patient's CR is NOT>1.4 OR Patient's EGFR is NOT<45 within past 12 mos.     Recent Labs   Lab Test 12/10/18  0836   GFRESTIMATED >90   GFRESTBLACK >90       Recent Labs   Lab Test 12/10/18  0836   CR 0.85             Passed - Patient does NOT have a diagnosis of CHF.        Passed - Medication is active on med list        Passed - Recent (6 mo) or future (30 days) visit within the authorizing provider's specialty     Patient had office visit in the last 6 months or has a visit in the next 30 days with authorizing provider or within the authorizing provider's specialty.  See \"Patient Info\" tab in inbasket, or \"Choose Columns\" in Meds & Orders section of the refill encounter.            Jnaet Lee RN  "

## 2019-04-11 ENCOUNTER — OFFICE VISIT (OUTPATIENT)
Dept: FAMILY MEDICINE | Facility: CLINIC | Age: 64
End: 2019-04-11
Payer: COMMERCIAL

## 2019-04-11 VITALS
OXYGEN SATURATION: 95 % | WEIGHT: 260 LBS | TEMPERATURE: 97.4 F | SYSTOLIC BLOOD PRESSURE: 132 MMHG | HEART RATE: 77 BPM | RESPIRATION RATE: 18 BRPM | DIASTOLIC BLOOD PRESSURE: 74 MMHG | BODY MASS INDEX: 36.26 KG/M2

## 2019-04-11 DIAGNOSIS — N52.8 OTHER MALE ERECTILE DYSFUNCTION: ICD-10-CM

## 2019-04-11 DIAGNOSIS — I10 HYPERTENSION GOAL BP (BLOOD PRESSURE) < 140/90: ICD-10-CM

## 2019-04-11 DIAGNOSIS — E78.5 HYPERLIPIDEMIA LDL GOAL <100: ICD-10-CM

## 2019-04-11 DIAGNOSIS — E11.42 CONTROLLED TYPE 2 DIABETES MELLITUS WITH DIABETIC POLYNEUROPATHY, WITHOUT LONG-TERM CURRENT USE OF INSULIN (H): Primary | ICD-10-CM

## 2019-04-11 LAB — HBA1C MFR BLD: 7.3 % (ref 0–5.6)

## 2019-04-11 PROCEDURE — 99214 OFFICE O/P EST MOD 30 MIN: CPT | Performed by: INTERNAL MEDICINE

## 2019-04-11 PROCEDURE — 36415 COLL VENOUS BLD VENIPUNCTURE: CPT | Performed by: INTERNAL MEDICINE

## 2019-04-11 PROCEDURE — 83036 HEMOGLOBIN GLYCOSYLATED A1C: CPT | Performed by: INTERNAL MEDICINE

## 2019-04-11 RX ORDER — SILDENAFIL 100 MG/1
100 TABLET, FILM COATED ORAL DAILY PRN
Qty: 6 TABLET | Refills: 1 | Status: SHIPPED | OUTPATIENT
Start: 2019-04-11 | End: 2024-07-29

## 2019-04-11 RX ORDER — LOSARTAN POTASSIUM 25 MG/1
25 TABLET ORAL DAILY
Qty: 180 TABLET | Refills: 1 | Status: SHIPPED | OUTPATIENT
Start: 2019-04-11 | End: 2019-08-22

## 2019-04-11 RX ORDER — SIMVASTATIN 20 MG
20 TABLET ORAL DAILY
Qty: 90 TABLET | Refills: 1 | Status: SHIPPED | OUTPATIENT
Start: 2019-04-11 | End: 2019-08-22

## 2019-04-11 RX ORDER — GLIMEPIRIDE 2 MG/1
2 TABLET ORAL
Qty: 90 TABLET | Refills: 3 | Status: CANCELLED | OUTPATIENT
Start: 2019-04-11

## 2019-04-11 RX ORDER — GLIMEPIRIDE 4 MG/1
4 TABLET ORAL
Qty: 90 TABLET | Refills: 1 | Status: SHIPPED | OUTPATIENT
Start: 2019-04-11 | End: 2019-08-22

## 2019-04-11 NOTE — PATIENT INSTRUCTIONS
1) Please remember to bring your Glucometer with you at next visit for hemoglobin A1C test with the lab in order that we can download your blood glucose readings. This doesn't need to be a face to face visit with me.
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
## 2019-04-15 ENCOUNTER — MYC MEDICAL ADVICE (OUTPATIENT)
Dept: FAMILY MEDICINE | Facility: CLINIC | Age: 64
End: 2019-04-15

## 2019-04-15 DIAGNOSIS — E11.42 CONTROLLED TYPE 2 DIABETES MELLITUS WITH DIABETIC POLYNEUROPATHY, WITHOUT LONG-TERM CURRENT USE OF INSULIN (H): ICD-10-CM

## 2019-04-16 ENCOUNTER — TELEPHONE (OUTPATIENT)
Dept: FAMILY MEDICINE | Facility: CLINIC | Age: 64
End: 2019-04-16

## 2019-04-16 DIAGNOSIS — E11.42 CONTROLLED TYPE 2 DIABETES MELLITUS WITH DIABETIC POLYNEUROPATHY, WITHOUT LONG-TERM CURRENT USE OF INSULIN (H): ICD-10-CM

## 2019-04-16 NOTE — TELEPHONE ENCOUNTER
Prior Authorization Retail Medication Request    Medication/Dose: insulin pen needle (ULTICARE MINI) 31G X 6 MM miscellaneous  ICD code (if different than what is on RX):    Previously Tried and Failed:    Rationale:  Insurance does not cover medication. Would you like to start PA or change medication    Insurance Name:    Insurance ID:        Pharmacy Information (if different than what is on RX)  Name:    Phone:

## 2019-04-22 NOTE — TELEPHONE ENCOUNTER
Prior Authorization Not Needed per Insurance    Medication: insulin pen needle (ULTICARE MINI) 31G X 6 MM miscellaneous  Insurance Company: [employer] - Phone [phone] Fax [phone]  Expected CoPay:      Pharmacy Filling the Rx: [facility] [id number] [place] [address] [address] [facility] [address] [place] [address]  Pharmacy Notified:  yes  Patient Notified:  no    I called the pharmacy to double check which insurance they are billing the pen needles too and they are billing his [employer] Medicare plan and they only cover BD Pen Needles.  Unless the provider would like to pursue the PA through his [employer] coverage, the pharmacy would just like a new order sent over the BD Pen Needles as they do not cover Ulticare Pen Needles.

## 2019-05-17 DIAGNOSIS — E11.42 CONTROLLED TYPE 2 DIABETES MELLITUS WITH DIABETIC POLYNEUROPATHY, WITHOUT LONG-TERM CURRENT USE OF INSULIN (H): ICD-10-CM

## 2019-05-17 NOTE — TELEPHONE ENCOUNTER
"Routing refill request to provider for review/approval because:  Labs not current:  Microalbumin    Requested Prescriptions   Pending Prescriptions Disp Refills     metFORMIN (GLUCOPHAGE) 1000 MG tablet [Pharmacy Med Name: METFORMIN 1000MG TABLETS] 60 tablet 0     Sig: TAKE 1 TABLET BY MOUTH TWICE DAILY WITH MEALS       Biguanide Agents Failed - 5/17/2019  7:35 AM        Failed - Patient has had a Microalbumin in the past 15 mos.     Recent Labs   Lab Test 02/12/18  1645   MICROL 26   UMALCR 16.25             Passed - Blood pressure less than 140/90 in past 6 months     BP Readings from Last 3 Encounters:   04/11/19 132/74   02/26/19 145/74   02/14/19 144/74                 Passed - Patient has documented LDL within the past 12 mos.     Recent Labs   Lab Test 09/25/18  0901   LDL 18             Passed - Patient is age 10 or older        Passed - Patient has documented A1c within the specified period of time.     If HgbA1C is 8 or greater, it needs to be on file within the past 3 months.  If less than 8, must be on file within the past 6 months.     Recent Labs   Lab Test 04/11/19  1644   A1C 7.3*             Passed - Patient's CR is NOT>1.4 OR Patient's EGFR is NOT<45 within past 12 mos.     Recent Labs   Lab Test 12/10/18  0836   GFRESTIMATED >90   GFRESTBLACK >90       Recent Labs   Lab Test 12/10/18  0836   CR 0.85             Passed - Patient does NOT have a diagnosis of CHF.        Passed - Medication is active on med list        Passed - Recent (6 mo) or future (30 days) visit within the authorizing provider's specialty     Patient had office visit in the last 6 months or has a visit in the next 30 days with authorizing provider or within the authorizing provider's specialty.  See \"Patient Info\" tab in inbasket, or \"Choose Columns\" in Meds & Orders section of the refill encounter.            Janet Lee RN  "

## 2019-06-20 ENCOUNTER — OFFICE VISIT (OUTPATIENT)
Dept: FAMILY MEDICINE | Facility: CLINIC | Age: 64
End: 2019-06-20
Payer: COMMERCIAL

## 2019-06-20 VITALS
TEMPERATURE: 97.4 F | OXYGEN SATURATION: 94 % | DIASTOLIC BLOOD PRESSURE: 72 MMHG | BODY MASS INDEX: 36.26 KG/M2 | SYSTOLIC BLOOD PRESSURE: 136 MMHG | RESPIRATION RATE: 20 BRPM | WEIGHT: 260 LBS | HEART RATE: 93 BPM

## 2019-06-20 DIAGNOSIS — E11.42 CONTROLLED TYPE 2 DIABETES MELLITUS WITH DIABETIC POLYNEUROPATHY, WITHOUT LONG-TERM CURRENT USE OF INSULIN (H): Primary | ICD-10-CM

## 2019-06-20 LAB — HBA1C MFR BLD: 7.1 % (ref 0–5.6)

## 2019-06-20 PROCEDURE — 83036 HEMOGLOBIN GLYCOSYLATED A1C: CPT | Performed by: INTERNAL MEDICINE

## 2019-06-20 PROCEDURE — 36415 COLL VENOUS BLD VENIPUNCTURE: CPT | Performed by: INTERNAL MEDICINE
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
## 2019-06-24 ENCOUNTER — TELEPHONE (OUTPATIENT)
Dept: FAMILY MEDICINE | Facility: CLINIC | Age: 64
End: 2019-06-24

## 2019-06-24 NOTE — TELEPHONE ENCOUNTER
Fax request from pharmacy Lantus solostar is not covered and requires PA. Preferred Basaglar, Levemir, tresiba or medical necessity PA only.      Prior Authorization Retail Medication Request    Medication/Dose: Lantus solostar  ICD code (if different than what is on RX):    Previously Tried and Failed:    Rationale:      Insurance Name:  [phone]  Insurance ID:  [id number]      Pharmacy Information (if different than what is on RX)  Name:    Phone:

## 2019-07-05 NOTE — TELEPHONE ENCOUNTER
Central Prior Authorization Team   Phone: [phone]    Prior Authorization Not Needed per Insurance    Medication: insulin glargine (LANTUS SOLOSTAR PEN) 100 UNIT/ML pen- NOT NEEDED  Insurance Company: [employer] - Phone [phone] Fax [phone]  Expected CoPay:      Pharmacy Filling the Rx: [facility] [address] [place] [address] AT [place] [id number]  Pharmacy Notified: Yes  Patient Notified: Yes    Medication changed to Basaglar per insurance formulary.

## 2019-08-22 ENCOUNTER — ANCILLARY PROCEDURE (OUTPATIENT)
Dept: GENERAL RADIOLOGY | Facility: CLINIC | Age: 64
End: 2019-08-22
Attending: INTERNAL MEDICINE
Payer: COMMERCIAL

## 2019-08-22 ENCOUNTER — OFFICE VISIT (OUTPATIENT)
Dept: FAMILY MEDICINE | Facility: CLINIC | Age: 64
End: 2019-08-22
Payer: COMMERCIAL

## 2019-08-22 VITALS
RESPIRATION RATE: 20 BRPM | HEART RATE: 75 BPM | SYSTOLIC BLOOD PRESSURE: 126 MMHG | BODY MASS INDEX: 35.98 KG/M2 | DIASTOLIC BLOOD PRESSURE: 76 MMHG | OXYGEN SATURATION: 97 % | WEIGHT: 258 LBS | TEMPERATURE: 97.2 F

## 2019-08-22 DIAGNOSIS — M62.08 DIASTASIS RECTI: ICD-10-CM

## 2019-08-22 DIAGNOSIS — E78.5 HYPERLIPIDEMIA LDL GOAL <100: ICD-10-CM

## 2019-08-22 DIAGNOSIS — M16.51 POST-TRAUMATIC OSTEOARTHRITIS OF RIGHT HIP: ICD-10-CM

## 2019-08-22 DIAGNOSIS — E11.42 CONTROLLED TYPE 2 DIABETES MELLITUS WITH DIABETIC POLYNEUROPATHY, WITHOUT LONG-TERM CURRENT USE OF INSULIN (H): Primary | ICD-10-CM

## 2019-08-22 DIAGNOSIS — I10 HYPERTENSION GOAL BP (BLOOD PRESSURE) < 140/90: ICD-10-CM

## 2019-08-22 PROCEDURE — 82043 UR ALBUMIN QUANTITATIVE: CPT | Performed by: INTERNAL MEDICINE

## 2019-08-22 PROCEDURE — 73502 X-RAY EXAM HIP UNI 2-3 VIEWS: CPT

## 2019-08-22 PROCEDURE — 99214 OFFICE O/P EST MOD 30 MIN: CPT | Performed by: INTERNAL MEDICINE

## 2019-08-22 RX ORDER — SIMVASTATIN 20 MG
20 TABLET ORAL DAILY
Qty: 90 TABLET | Refills: 1 | Status: SHIPPED | OUTPATIENT
Start: 2019-08-22 | End: 2020-07-06

## 2019-08-22 RX ORDER — LOSARTAN POTASSIUM 25 MG/1
25 TABLET ORAL DAILY
Qty: 180 TABLET | Refills: 1 | Status: SHIPPED | OUTPATIENT
Start: 2019-08-22 | End: 2020-04-29

## 2019-08-22 RX ORDER — GLIMEPIRIDE 4 MG/1
4 TABLET ORAL
Qty: 90 TABLET | Refills: 1 | Status: SHIPPED | OUTPATIENT
Start: 2019-08-22 | End: 2020-05-21
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
## 2019-08-22 NOTE — LETTER
[address] [place] [address]    August 26, 2019    Florian Lawrence  [facility] [facility] [address]          Dear Florian,    All of these tests are within acceptable limits , things look good !       Enclosed is a copy of your results.     Results for orders placed or performed in visit on 08/22/19   Albumin Random Urine Quantitative with Creat Ratio   Result Value Ref Range    Creatinine Urine 228 mg/dL    Albumin Urine mg/L 27 mg/L    Albumin Urine mg/g Cr 11.97 0 - 17 mg/g Cr       If you have any questions or concerns, please call myself or my nurse at [phone].      Sincerely,        Cecilio Pro MD/sherron

## 2019-08-22 NOTE — PATIENT INSTRUCTIONS
1) I refilled your medications and your diabetes is well controlled. Come back for hemoglobin A1C test in 6 months.     2) I placed a referral for you to see orthopedics and discuss further hip issues as well as treatment options for this.

## 2019-08-22 NOTE — PROGRESS NOTES
Last appointment with me was 6-. For complete details please see most recent previous office visit with me. I had thought next appointment would be in September ? We have fallen slightly out of the usual pattern of biannual office visits. Need to re-correlate / recalibrate our schedules of laboratory studies and office visits for diabetic care !    Preventative healthcare maintenance goals including      Health Maintenance   Topic Date Due     PREVENTIVE CARE VISIT  1955     ZOSTER IMMUNIZATION (1 of 2) 03/27/2005     MICROALBUMIN  02/12/2019     INFLUENZA VACCINE (1) 09/01/2019     LIPID  09/25/2019     DIABETIC FOOT EXAM  09/25/2019     EYE EXAM  11/15/2019     BMP  12/10/2019     A1C  12/20/2019     TSH W/FREE T4 REFLEX  12/10/2020     ADVANCE CARE PLANNING  06/09/2022     DTAP/TDAP/TD IMMUNIZATION (4 - Td) 12/18/2024     COLONOSCOPY  12/08/2027     HEPATITIS C SCREENING  Completed     HIV SCREENING  Completed     PHQ-2  Completed     IPV IMMUNIZATION  Aged Out     MENINGITIS IMMUNIZATION  Aged Out        Lab Results   Component Value Date    A1C 7.1 06/20/2019    A1C 7.3 04/11/2019    A1C 6.9 09/25/2018    A1C 6.9 02/12/2018    A1C 6.6 06/09/2017

## 2019-08-23 LAB
CREAT UR-MCNC: 228 MG/DL
MICROALBUMIN UR-MCNC: 27 MG/L
MICROALBUMIN/CREAT UR: 11.97 MG/G CR (ref 0–17)
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
## 2019-08-27 ENCOUNTER — OFFICE VISIT (OUTPATIENT)
Dept: ORTHOPEDICS | Facility: CLINIC | Age: 64
End: 2019-08-27
Payer: COMMERCIAL

## 2019-08-27 VITALS
SYSTOLIC BLOOD PRESSURE: 122 MMHG | OXYGEN SATURATION: 96 % | HEIGHT: 71 IN | BODY MASS INDEX: 36.12 KG/M2 | HEART RATE: 78 BPM | WEIGHT: 258 LBS | DIASTOLIC BLOOD PRESSURE: 70 MMHG

## 2019-08-27 DIAGNOSIS — M16.51 POST-TRAUMATIC OSTEOARTHRITIS OF RIGHT HIP: Primary | ICD-10-CM

## 2019-08-27 PROCEDURE — 99214 OFFICE O/P EST MOD 30 MIN: CPT | Performed by: ORTHOPAEDIC SURGERY

## 2019-08-27 ASSESSMENT — MIFFLIN-ST. JEOR: SCORE: 1982.41
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
## 2019-08-29 DIAGNOSIS — M16.11 OSTEOARTHRITIS OF RIGHT HIP: Primary | ICD-10-CM

## 2019-08-30 ENCOUNTER — ANCILLARY PROCEDURE (OUTPATIENT)
Dept: GENERAL RADIOLOGY | Facility: CLINIC | Age: 64
End: 2019-08-30
Attending: ORTHOPAEDIC SURGERY
Payer: COMMERCIAL

## 2019-08-30 DIAGNOSIS — M16.11 OSTEOARTHRITIS OF RIGHT HIP: ICD-10-CM

## 2019-08-30 PROCEDURE — 73501 X-RAY EXAM HIP UNI 1 VIEW: CPT

## 2019-09-16 ENCOUNTER — MYC MEDICAL ADVICE (OUTPATIENT)
Dept: ORTHOPEDICS | Facility: OTHER | Age: 64
End: 2019-09-16

## 2019-09-16 DIAGNOSIS — M16.51 POST-TRAUMATIC OSTEOARTHRITIS OF RIGHT HIP: Primary | ICD-10-CM

## 2019-09-17 ENCOUNTER — TELEPHONE (OUTPATIENT)
Dept: ORTHOPEDICS | Facility: CLINIC | Age: 64
End: 2019-09-17

## 2019-09-17 NOTE — TELEPHONE ENCOUNTER
Type of surgery: right total hip arthroplasty CPT code 60284  Osteoarthritis of right hip [M16.11]  - Primary   Location of surgery: [facility]  Date and time of surgery: 10-28-19  7:30am  Surgeon: Dr Curry  Pre-Op Appt Date: 10-14-19  Post-Op Appt Date: 11-12-19   Packet sent out: Yes  Pre-cert/Authorization completed:  Has been approved for 2 days. [id number].   Date: 09/18/2019    Sent to  and was received. 09/30/2019    Insurance valid. 10/01/2019

## 2019-10-14 ENCOUNTER — OFFICE VISIT (OUTPATIENT)
Dept: FAMILY MEDICINE | Facility: CLINIC | Age: 64
End: 2019-10-14
Payer: COMMERCIAL

## 2019-10-14 VITALS
OXYGEN SATURATION: 95 % | TEMPERATURE: 96.7 F | WEIGHT: 257 LBS | SYSTOLIC BLOOD PRESSURE: 128 MMHG | BODY MASS INDEX: 35.84 KG/M2 | HEART RATE: 79 BPM | RESPIRATION RATE: 20 BRPM | DIASTOLIC BLOOD PRESSURE: 70 MMHG

## 2019-10-14 DIAGNOSIS — Z23 NEED FOR PROPHYLACTIC VACCINATION AND INOCULATION AGAINST INFLUENZA: ICD-10-CM

## 2019-10-14 DIAGNOSIS — Z01.818 PREOP GENERAL PHYSICAL EXAM: Primary | ICD-10-CM

## 2019-10-14 DIAGNOSIS — E11.42 CONTROLLED TYPE 2 DIABETES MELLITUS WITH DIABETIC POLYNEUROPATHY, WITHOUT LONG-TERM CURRENT USE OF INSULIN (H): ICD-10-CM

## 2019-10-14 LAB
ANION GAP SERPL CALCULATED.3IONS-SCNC: 9 MMOL/L (ref 3–14)
BUN SERPL-MCNC: 24 MG/DL (ref 7–30)
CALCIUM SERPL-MCNC: 9.3 MG/DL (ref 8.5–10.1)
CHLORIDE SERPL-SCNC: 105 MMOL/L (ref 94–109)
CO2 SERPL-SCNC: 23 MMOL/L (ref 20–32)
CREAT SERPL-MCNC: 0.7 MG/DL (ref 0.66–1.25)
GFR SERPL CREATININE-BSD FRML MDRD: >90 ML/MIN/{1.73_M2}
GLUCOSE SERPL-MCNC: 224 MG/DL (ref 70–99)
HBA1C MFR BLD: 6.8 % (ref 0–5.6)
HGB BLD-MCNC: 15.6 G/DL (ref 13.3–17.7)
POTASSIUM SERPL-SCNC: 4.2 MMOL/L (ref 3.4–5.3)
SODIUM SERPL-SCNC: 137 MMOL/L (ref 133–144)

## 2019-10-14 PROCEDURE — 85018 HEMOGLOBIN: CPT | Performed by: INTERNAL MEDICINE

## 2019-10-14 PROCEDURE — 90471 IMMUNIZATION ADMIN: CPT | Performed by: INTERNAL MEDICINE

## 2019-10-14 PROCEDURE — 36415 COLL VENOUS BLD VENIPUNCTURE: CPT | Performed by: INTERNAL MEDICINE

## 2019-10-14 PROCEDURE — 80048 BASIC METABOLIC PNL TOTAL CA: CPT | Performed by: INTERNAL MEDICINE

## 2019-10-14 PROCEDURE — 93000 ELECTROCARDIOGRAM COMPLETE: CPT | Performed by: INTERNAL MEDICINE

## 2019-10-14 PROCEDURE — 83036 HEMOGLOBIN GLYCOSYLATED A1C: CPT | Performed by: INTERNAL MEDICINE

## 2019-10-14 PROCEDURE — 99215 OFFICE O/P EST HI 40 MIN: CPT | Mod: 25 | Performed by: INTERNAL MEDICINE

## 2019-10-14 PROCEDURE — 90682 RIV4 VACC RECOMBINANT DNA IM: CPT | Performed by: INTERNAL MEDICINE

## 2019-10-14 NOTE — PATIENT INSTRUCTIONS
Before Your Surgery      Call your surgeon if there is any change in your health. This includes signs of a cold or flu (such as a sore throat, runny nose, cough, rash or fever).    Do not smoke, drink alcohol or take over the counter medicine (unless your surgeon or primary care doctor tells you to) for the 24 hours before and after surgery.    If you take prescribed drugs: Follow your doctor s orders about which medicines to take and which to stop until after surgery.    Eating and drinking prior to surgery: follow the instructions from your surgeon    Take a shower or bath the night before surgery. Use the soap your surgeon gave you to gently clean your skin. If you do not have soap from your surgeon, use your regular soap. Do not shave or scrub the surgery site.  Wear clean pajamas and have clean sheets on your bed.     The night before the surgery take only 40 units of Basaglar  (insulin glargine injection)   Morning of the surgery skip the Metformin ( Glucophage)  And the glimepiride [ Amaryl ]   The week before surgery no aspirin or non-steroidal anti-inflammatory drugs such as ADVIL (ibuprofen) or Naproxen [Aleve] but acetaminophen is ok    All other medications are ok to take up to and including the night before the surgery and morning of the surgery
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
## 2019-10-14 NOTE — LETTER
[address] [place] [address]    October 15, 2019    Florian Lawrence  [facility] [facility] [address]          Dear Florian,    Enclosed is a copy of your results.  These are all okay for surgery.    Results for orders placed or performed in visit on 10/14/19   Basic metabolic panel   Result Value Ref Range    Sodium 137 133 - 144 mmol/L    Potassium 4.2 3.4 - 5.3 mmol/L    Chloride 105 94 - 109 mmol/L    Carbon Dioxide 23 20 - 32 mmol/L    Anion Gap 9 3 - 14 mmol/L    Glucose 224 (H) 70 - 99 mg/dL    Urea Nitrogen 24 7 - 30 mg/dL    Creatinine 0.70 0.66 - 1.25 mg/dL    GFR Estimate >90 >60 mL/min/[1.73_m2]    GFR Estimate If Black >90 >60 mL/min/[1.73_m2]    Calcium 9.3 8.5 - 10.1 mg/dL   Hemoglobin   Result Value Ref Range    Hemoglobin 15.6 13.3 - 17.7 g/dL   Hemoglobin A1c   Result Value Ref Range    Hemoglobin A1C 6.8 (H) 0 - 5.6 %     If you have any questions or concerns, please call myself or my nurse at [phone].    Sincerely,        Cecilio Pro MD/[place]

## 2019-11-03 ENCOUNTER — HEALTH MAINTENANCE LETTER (OUTPATIENT)
Age: 64
End: 2019-11-03

## 2019-11-04 ENCOUNTER — MYC MEDICAL ADVICE (OUTPATIENT)
Dept: ORTHOPEDICS | Facility: CLINIC | Age: 64
End: 2019-11-04

## 2019-11-04 NOTE — PROGRESS NOTES
"Florian Lawrence is a 64 year old male who's here early for the first postoperative visit, status post right total hip arthroplasty and hardware removal from 10/28/2019. It has been approximately 1 week since surgery. He had a very painful pop/crack that occurred when he arose from a chair 2 days ago. The pain resolved quickly. He was asked to come in to make sure that he did not have a periprosthetic fracture. He reports today that the pop occurred while he was trying to get out of a chair. He felt a sudden snap in his right hip. He can't pinpoint where he felt the snapping. He noticed a \"line\" down his leg.    He gets occasional sharp pain in the groin area but nothing like the previous episode he had. He also mentions an allergic reaction on the bridge of his nose.    He has had persistent drainage postoperatively from the drain site in the hospital, and was put on Keflex for 7 days. He reports no drainage for the past 5 days.    Drug Hx: He continues to take Aspirin twice a day and Keflex four times a day.     Review of Systems:  Constitutional/General: Negative for fever, chills, change in weight  Integumentary/Skin: Negative for worrisome rashes, moles, or lesions  Neuro: Negative for weakness, dizziness, or paresthesias   Psychiatric: negative for changes in mood or affect    This document serves as a record of the services and decisions personally performed and made by MARITA Curry MD. It was created on his behalf by Kiara Ornelas, a trained medical scribe. The creation of this document is based the provider's statements to the medical scribe.    Scribe Kiara Ornelas 11:37 AM 11/5/2019       OBJECTIVE:  Physical Exam:  /76 (BP Location: Left arm, Patient Position: Sitting, Cuff Size: Adult Large)   Pulse 99   Temp 97.5  F (36.4  C)   Ht 1.803 m (5' 11\")   Wt 116.6 kg (257 lb)   SpO2 97%   BMI 35.84 kg/m    General Appearance: healthy, alert and no distress   Skin: no suspicious lesions " "or rashes  Neuro: Normal strength and tone, mentation intact and speech normal  Vascular: good pulses, and capillary refill   Lymph: no lymphadenopathy   Psych:  mentation appears normal and affect normal/bright  Resp: no increased work of breathing    Right Hip Exam:  Inspection: Wound looks good, no evidence of infection. No drainage. Leg length appears roughly equal.  The \"line\" he noticed is his old incision.  Tenderness: No calf tenderness  ROM:   Abduction Strength: good   Flexion Strength: slightly weak and painful   Rotation of the hip: some pain    Radiographs from 11/5/2019 were reviewed with the patient, and demonstrate: no fractures. The components are in good position.     Impression:  Doing well, status post total hip arthroplasty and hardware removal.  Unsure about what the snap was, but could be related to the capsular repair or vastus lateralis repair    Plan:  1. Scar Management.   2. Continue working on home exercise program.   3. Continue taking Aspirin twice a day.  4. Follow up in six weeks postoperatively.   5. At the end of the of the visit, he mentioned he had swelling at the bridge of his nose similar to a reaction he had to Lisinopril that was deemed to be an allergic reaction. There is no evidence of allergic reaction on exam, I advised him to continue to take antibiotics until he finishes and he will follow up with his primary care physician.    The information in this document, created by a scribe for me, accurately reflects the services I personally performed and the decisions made by me. I have reviewed and approved this document for accuracy.     MARITA Curry MD  Dept. Orthopedic Surgery  [facility]      "

## 2019-11-04 NOTE — TELEPHONE ENCOUNTER
Advised patient Dr Curry would like him to RTC for an Xray and examination of operative site. P: Appt made for 11/5/19 @ [facility] and transportation is available at that time.  Henok Jackson OPA

## 2019-11-05 ENCOUNTER — ANCILLARY PROCEDURE (OUTPATIENT)
Dept: GENERAL RADIOLOGY | Facility: CLINIC | Age: 64
End: 2019-11-05
Attending: ORTHOPAEDIC SURGERY
Payer: COMMERCIAL

## 2019-11-05 ENCOUNTER — OFFICE VISIT (OUTPATIENT)
Dept: ORTHOPEDICS | Facility: CLINIC | Age: 64
End: 2019-11-05
Payer: COMMERCIAL

## 2019-11-05 VITALS
WEIGHT: 257 LBS | OXYGEN SATURATION: 97 % | HEIGHT: 71 IN | HEART RATE: 99 BPM | SYSTOLIC BLOOD PRESSURE: 124 MMHG | TEMPERATURE: 97.5 F | DIASTOLIC BLOOD PRESSURE: 76 MMHG | BODY MASS INDEX: 35.98 KG/M2

## 2019-11-05 DIAGNOSIS — Z96.641 STATUS POST TOTAL REPLACEMENT OF RIGHT HIP: ICD-10-CM

## 2019-11-05 DIAGNOSIS — Z96.641 STATUS POST TOTAL REPLACEMENT OF RIGHT HIP: Primary | ICD-10-CM

## 2019-11-05 PROCEDURE — 73502 X-RAY EXAM HIP UNI 2-3 VIEWS: CPT

## 2019-11-05 PROCEDURE — 99024 POSTOP FOLLOW-UP VISIT: CPT | Performed by: ORTHOPAEDIC SURGERY

## 2019-11-05 ASSESSMENT — MIFFLIN-ST. JEOR: SCORE: 1977.87

## 2019-11-05 NOTE — LETTER
11/5/2019         RE: Florian Lawrence  [address] [facility] [address]        Dear Colleague,    Thank you for referring your patient, Florian Lawrence, to the [facility]. Please see a copy of my visit note below.    Florian Lawrence is a 64 year old male who's here early for the first postoperative visit, status post right total hip arthroplasty and hardware removal from 10/28/2019. It has been approximately 1 week since surgery. He had a very painful pop/crack that occurred when he arose from a chair 2 days ago. The pain resolved quickly. He was asked to come in to make sure that he did not have a periprosthetic fracture. He reports today that the pop occurred while he was trying to get out of a chair. He felt a sudden snap in his right hip. He can't pinpoint where he felt the snapping. Afterwards, he felt a sharp pain throughout his right whole leg. He gets occasional sharp pain in the groin area but nothing like the previous episode he had. He also mentions an allergic reaction on the bridge of his nose.    He has had persistent drainage postoperatively from the drain site in the hospital, and was put on Keflex for 7 days. He reports no drainage for the past 5 days.    Drug Hx: He continues to take Aspirin twice a day and Keflex four times a day.     Review of Systems:  Constitutional/General: Negative for fever, chills, change in weight  Integumentary/Skin: Negative for worrisome rashes, moles, or lesions  Neuro: Negative for weakness, dizziness, or paresthesias   Psychiatric: negative for changes in mood or affect    This document serves as a record of the services and decisions personally performed and made by MARITA Curry MD. It was created on his behalf by Kiara Ornelas, a trained medical scribe. The creation of this document is based the provider's statements to the medical scribe.    Scribe Kiara Ornelas 11:37 AM 11/5/2019       OBJECTIVE:  Physical Exam:  BP  "124/76 (BP Location: Left arm, Patient Position: Sitting, Cuff Size: Adult Large)   Pulse 99   Temp 97.5  F (36.4  C)   Ht 1.803 m (5' 11\")   Wt 116.6 kg (257 lb)   SpO2 97%   BMI 35.84 kg/m     General Appearance: healthy, alert and no distress   Skin: no suspicious lesions or rashes  Neuro: Normal strength and tone, mentation intact and speech normal  Vascular: good pulses, and capillary refill   Lymph: no lymphadenopathy   Psych:  mentation appears normal and affect normal/bright  Resp: no increased work of breathing    Right Hip Exam:  Inspection: Wound looks good, no evidence of infection. Leg length appears roughly equal.  Tenderness: No calf tenderness  ROM:   Abduction Strength: good   Flexion Strength: slightly weak and painful   Rotation of the hip: some pain    Radiographs from 11/5/2019 were reviewed with the patient, and demonstrate: no fractures. The components are in good position.     Impression:  Doing well, status post total hip arthroplasty and hardware removal.    Plan:  1. Scar Management.   2. Continue working on home exercise program.   3. Continue taking Aspirin twice a day.  4. Follow up in six weeks postoperatively.   5. At the end of the of the visit, he mentioned he had swelling at the bridge of his nose similar to a reaction he had to Lisinopril that was deemed to be an allergic reaction. There is no evidence of allergic reaction on exam, I advised him to continue to take antibiotics until he finishes and he will follow up with his primary care physician.    The information in this document, created by a scribe for me, accurately reflects the services I personally performed and the decisions made by me. I have reviewed and approved this document for accuracy.     MARITA Curry MD  Dept. Orthopedic Surgery  [facility]        Again, thank you for allowing me to participate in the care of your patient.        Sincerely,        Atul Curry MD    "

## 2019-11-18 ENCOUNTER — TRANSFERRED RECORDS (OUTPATIENT)
Dept: HEALTH INFORMATION MANAGEMENT | Facility: CLINIC | Age: 64
End: 2019-11-18

## 2019-11-25 ENCOUNTER — TELEPHONE (OUTPATIENT)
Dept: FAMILY MEDICINE | Facility: CLINIC | Age: 64
End: 2019-11-25

## 2019-11-25 ENCOUNTER — TELEPHONE (OUTPATIENT)
Dept: ORTHOPEDICS | Facility: CLINIC | Age: 64
End: 2019-11-25

## 2019-11-25 NOTE — TELEPHONE ENCOUNTER
Please abstract the following data from this visit with this patient into the appropriate field in Epic:    Tests that can be patient reported without a hard copy:      Other Tests found in the patient's chart through Chart Review/Care Everywhere:    Eye exam with ophthalmology on this date: 11/15/18-please add negative for retinopathy.    Note to [employer]: If this section is blank, no results were found via Chart Review/Care Everywhere.

## 2019-11-25 NOTE — TELEPHONE ENCOUNTER
Reason for Call:  Other call back    Detailed comments: Reva with [facility] is holding on the line millie Lawrence.    He is there for a cleaning, is 4 weeks too soon since surgery with Dr. Curry?      Phone Number Patient can be reached at: Other phone number:  n/a    Best Time: n/a    Can we leave a detailed message on this number? Not Applicable    Call taken on 11/25/2019 at 2:15 PM by Martha Holloway

## 2019-11-25 NOTE — TELEPHONE ENCOUNTER
Called and informed pt that he should wait at least 4-6 months after surgery to see the dentist, yes even for a cleaning.  Shaneka Jaquez CMA CMA 11/25/2019 2:38 PM

## 2019-12-12 NOTE — PROGRESS NOTES
"SUBJECTIVE:   Florian Lawrence is here for follow up of status post right total hip arthroplasty and hardware removal from 10/28/2019. It has been approximately 7 weeks since surgery. He gets occassional twinges in the groin area if he over does it. He reports trouble lifting his right leg with ambulation, which he thinks might be due to not walking a lot. He reports ocassional discoloration in the bottom of his right leg. He says that his ROM has improved significantly. He has finished his course of antibiotics and aspirin. Overall, he reports he's doing much better than his last visit.     Review of Systems:  Constitutional/General: Negative for fever, chills, change in weight  Integumentary/Skin: Negative for worrisome rashes, moles, or lesions  Neuro: Negative for weakness, dizziness, or paresthesias   Psychiatric: negative for changes in mood or affect    This document serves as a record of the services and decisions personally performed and made by MARITA Curry MD. It was created on his behalf by Kiara Ornelas, a trained medical scribe. The creation of this document is based the provider's statements to the medical scribe.    Scribe Kiara Ornelas 10:07 AM 12/17/2019       OBJECTIVE:  Physical Exam:  /68 (BP Location: Left arm, Patient Position: Sitting, Cuff Size: Adult Regular)   Pulse 84   Ht 1.803 m (5' 11\")   Wt 116.6 kg (257 lb)   SpO2 96%   BMI 35.84 kg/m    General Appearance: healthy, alert and no distress   Skin: no suspicious lesions or rashes  Neuro: Normal strength and tone, mentation intact and speech normal  Vascular: good pulses, and capillary refill   Lymph: no lymphadenopathy   Psych:  mentation appears normal and affect normal/bright  Resp: no increased work of breathing    Right Hip Exam:  Pelvis is level. Stands on equal weight bilaterally.  Normal gait.  Good flexion and abduction strength.  Pain in the groin area with internal rotation range of motion    ASSESSMENT: "   Doing well, status post total hip arthroplasty and hardware removal.    PLAN:   I suggested physical therapy in order to fine-tune his strength and walking ability/confidence.   We discussed the importance of dental prophylaxis.     Return to clinic: 6 weeks    The information in this document, created by a scribe for me, accurately reflects the services I personally performed and the decisions made by me. I have reviewed and approved this document for accuracy.     MARITA Curry MD  Dept. Orthopedic Surgery  [facility]

## 2019-12-17 ENCOUNTER — OFFICE VISIT (OUTPATIENT)
Dept: ORTHOPEDICS | Facility: CLINIC | Age: 64
End: 2019-12-17
Payer: COMMERCIAL

## 2019-12-17 VITALS
WEIGHT: 257 LBS | HEART RATE: 84 BPM | SYSTOLIC BLOOD PRESSURE: 114 MMHG | HEIGHT: 71 IN | OXYGEN SATURATION: 96 % | DIASTOLIC BLOOD PRESSURE: 68 MMHG | BODY MASS INDEX: 35.98 KG/M2

## 2019-12-17 DIAGNOSIS — Z96.641 STATUS POST TOTAL REPLACEMENT OF RIGHT HIP: Primary | ICD-10-CM

## 2019-12-17 PROCEDURE — 99024 POSTOP FOLLOW-UP VISIT: CPT | Performed by: ORTHOPAEDIC SURGERY

## 2019-12-17 ASSESSMENT — MIFFLIN-ST. JEOR: SCORE: 1977.87

## 2019-12-17 NOTE — LETTER
"    12/17/2019         RE: Florian Lawrence  [address] [address]        Dear Colleague,    Thank you for referring your patient, Florian Lawrence, to the [facility]. Please see a copy of my visit note below.    SUBJECTIVE:   Florian Lawrence is here for follow up of status post right total hip arthroplasty and hardware removal from 10/28/2019. It has been approximately 7 weeks since surgery. He gets occassional twinges in the groin area if he over does it. He reports trouble lifting his right leg with ambulation, which he thinks might be due to not walking a lot. He reports ocassional discoloration in the bottom of his right leg. He says that his ROM has improved significantly. He has finished his course of antibiotics and aspirin. Overall, he reports he's doing much better than his last visit.     Review of Systems:  Constitutional/General: Negative for fever, chills, change in weight  Integumentary/Skin: Negative for worrisome rashes, moles, or lesions  Neuro: Negative for weakness, dizziness, or paresthesias   Psychiatric: negative for changes in mood or affect    This document serves as a record of the services and decisions personally performed and made by MARITA Curry MD. It was created on his behalf by Kiara Ornelas, a trained medical scribe. The creation of this document is based the provider's statements to the medical scribe.    Scribe Kiara Greene Said 10:07 AM 12/17/2019       OBJECTIVE:  Physical Exam:  /68 (BP Location: Left arm, Patient Position: Sitting, Cuff Size: Adult Regular)   Pulse 84   Ht 1.803 m (5' 11\")   Wt 116.6 kg (257 lb)   SpO2 96%   BMI 35.84 kg/m     General Appearance: healthy, alert and no distress   Skin: no suspicious lesions or rashes  Neuro: Normal strength and tone, mentation intact and speech normal  Vascular: good pulses, and capillary refill   Lymph: no lymphadenopathy   Psych:  mentation appears normal and affect " normal/bright  Resp: no increased work of breathing    Right Hip Exam:  Pelvis is level. Stands on equal weight bilaterally.  Normal gait.  Good flexion and abduction strength.  Pain in the groin area with internal rotation range of motion    ASSESSMENT:   Doing well, status post total hip arthroplasty and hardware removal.    PLAN:   I suggested physical therapy in order to fine-tune his strength and walking ability/confidence.   We discussed the importance of dental prophylaxis.     Return to clinic: 6 weeks    The information in this document, created by a scribe for me, accurately reflects the services I personally performed and the decisions made by me. I have reviewed and approved this document for accuracy.     MARITA Curry MD  Dept. Orthopedic Surgery  [facility]       Again, thank you for allowing me to participate in the care of your patient.        Sincerely,        Atul Curry MD

## 2019-12-20 ENCOUNTER — THERAPY VISIT (OUTPATIENT)
Dept: PHYSICAL THERAPY | Facility: CLINIC | Age: 64
End: 2019-12-20
Attending: ORTHOPAEDIC SURGERY
Payer: COMMERCIAL

## 2019-12-20 DIAGNOSIS — R29.898 WEAKNESS OF RIGHT HIP: ICD-10-CM

## 2019-12-20 DIAGNOSIS — Z96.641 STATUS POST TOTAL REPLACEMENT OF RIGHT HIP: ICD-10-CM

## 2019-12-20 DIAGNOSIS — R53.81 PHYSICAL DECONDITIONING: ICD-10-CM

## 2019-12-20 PROCEDURE — 97110 THERAPEUTIC EXERCISES: CPT | Mod: GP | Performed by: PHYSICAL THERAPIST

## 2019-12-20 PROCEDURE — 97161 PT EVAL LOW COMPLEX 20 MIN: CPT | Mod: GP | Performed by: PHYSICAL THERAPIST

## 2019-12-20 ASSESSMENT — ACTIVITIES OF DAILY LIVING (ADL)
WALKING_DOWN_STEEP_HILLS: NO DIFFICULTY AT ALL
STEPPING_UP_AND_DOWN_CURBS: NO DIFFICULTY AT ALL
WALKING_APPROXIMATELY_10_MINUTES: MODERATE DIFFICULTY
HOS_ADL_ITEM_SCORE_TOTAL: 51
SITTING_FOR_15_MINUTES: NO DIFFICULTY AT ALL
ROLLING_OVER_IN_BED: NO DIFFICULTY AT ALL
GOING_DOWN_1_FLIGHT_OF_STAIRS: NO DIFFICULTY AT ALL
DEEP_SQUATTING: MODERATE DIFFICULTY
WALKING_UP_STEEP_HILLS: SLIGHT DIFFICULTY
GOING_UP_1_FLIGHT_OF_STAIRS: SLIGHT DIFFICULTY
HOS_ADL_SCORE(%): 75
PUTTING_ON_SOCKS_AND_SHOES: SLIGHT DIFFICULTY
STANDING_FOR_15_MINUTES: SLIGHT DIFFICULTY
GETTING_INTO_AND_OUT_OF_AN_AVERAGE_CAR: SLIGHT DIFFICULTY
LIGHT_TO_MODERATE_WORK: NO DIFFICULTY AT ALL
WALKING_15_MINUTES_OR_GREATER: MODERATE DIFFICULTY
TWISTING/PIVOTING_ON_INVOLVED_LEG: MODERATE DIFFICULTY
HOW_WOULD_YOU_RATE_YOUR_CURRENT_LEVEL_OF_FUNCTION_DURING_YOUR_USUAL_ACTIVITIES_OF_DAILY_LIVING_FROM_0_TO_100_WITH_100_BEING_YOUR_LEVEL_OF_FUNCTION_PRIOR_TO_YOUR_HIP_PROBLEM_AND_0_BEING_THE_INABILITY_TO_PERFORM_ANY_OF_YOUR_USUAL_DAILY_ACTIVITIES?: 75
HEAVY_WORK: MODERATE DIFFICULTY
HOS_ADL_HIGHEST_POTENTIAL_SCORE: 68
HOS_ADL_COUNT: 17
GETTING_INTO_AND_OUT_OF_A_BATHTUB: NO DIFFICULTY AT ALL
WALKING_INITIALLY: SLIGHT DIFFICULTY
RECREATIONAL_ACTIVITIES: MODERATE DIFFICULTY

## 2019-12-20 NOTE — PROGRESS NOTES
"Physical Therapy Initial Evaluation   R Hip  Precautions/Restrictions/MD instructions: [employer] Dr. Curry   Therapist Impression:   Pt is a 65 y/o Male, with acute on chronic history of hip pain 6 wks s/p R Hip replacement. Pt presents with s/s consistent with post-operative deconditioning. These impairments limit their ability to complete daily tasks without feelings of weakness. Skilled PT services necessary in order to reduce impairments and improve independent function.    Subjective:   Chief Complaint: R hip weakness - doesn't feel like \"his hip\" yet  DOI/onset: 10/28/2019 DOS: 10/28/2019  Location: General lateral hip/groin Quality: weakness  Frequency: Consistent Radiates: N/A  Pain scale: 1/10  Time of day: Frequently throughout Sleeping: N/A   Exacerbated by: upstairs, putting socks on,  Relieved by: Exercise Progression: Unchanging  Previous Treatment: Surgical intervention JO ANN Effect of prior treatment: N/A   PMH and/or surgical history: Overweight, diabetes. Allergies: Penicilin, Lisinopril. R JO ANN  Imaging: MRI/Xray    Occupation:  Job duties: Computer work, walking, repetitive tasks   Current HEP/Exercise regimen: Exercises from post-op Patient's goals: Get back to full activity level without feeling of weakness  Medications: HTN  General health as reported by patient: Good  Return to MD: PRN  Red Flags: None             Objective:  HIP:    SL Balance:   R: 10 sec (L hip drop)  L: 20 sec    Gait: Shortened step length on R, lateral trunk lean    Functional:   - Lateral step down: Lateral hip drop    PROM: NT - Total hip precautions    Strength:   L R   HIP     Flex 4/5 4/5   Ext 4/5 3+/5   Add (0-45-90 degrees: supine) 4/5 4+/5   Abd 4/5 3+/5   KNEE     Flex 4/5 4/5   Ext 4/5 4/5       Palpation: Non-tender        Assessment/Plan:    Patient is a 64 year old male with right side hip complaints.    Patient has the following significant findings with corresponding treatment plan.            "     Diagnosis 1:  R Hip deconditioning  Decreased strength - therapeutic exercise, therapeutic activities and home program  Impaired balance - neuro re-education, gait training, therapeutic activities and home program  Decreased proprioception - neuro re-education, gait training, therapeutic activities and home program  Impaired gait - gait training and home program  Impaired muscle performance - neuro re-education and home program  Decreased function - therapeutic activities and home program    Therapy Evaluation Codes:   1) History comprised of:   Personal factors that impact the plan of care:      None.    Comorbidity factors that impact the plan of care are:      None.     Medications impacting care: None.  2) Examination of Body Systems comprised of:   Body structures and functions that impact the plan of care:      Hip.   Activity limitations that impact the plan of care are:      Dressing, Running, Stairs, Walking and Working.  3) Clinical presentation characteristics are:   Stable/Uncomplicated.  4) Decision-Making    Low complexity using standardized patient assessment instrument and/or measureable assessment of functional outcome.  Cumulative Therapy Evaluation is: Low complexity.    Previous and current functional limitations:  (See Goal Flow Sheet for this information)    Short term and Long term goals: (See Goal Flow Sheet for this information)     Communication ability:  Patient appears to be able to clearly communicate and understand verbal and written communication and follow directions correctly.  Treatment Explanation - The following has been discussed with the patient:   RX ordered/plan of care  Anticipated outcomes  Possible risks and side effects  This patient would benefit from PT intervention to resume normal activities.   Rehab potential is excellent.    Frequency:  1 X week, once daily  Duration:  for 6 weeks  Discharge Plan:  Achieve all LTG.  Independent in home treatment program.  Reach  maximal therapeutic benefit.    Please refer to the daily flowsheet for treatment today, total treatment time and time spent performing 1:1 timed codes.

## 2019-12-23 PROBLEM — R53.81 PHYSICAL DECONDITIONING: Status: ACTIVE | Noted: 2019-12-23

## 2019-12-23 PROBLEM — R29.898 WEAKNESS OF RIGHT HIP: Status: ACTIVE | Noted: 2019-12-23

## 2020-01-03 ENCOUNTER — THERAPY VISIT (OUTPATIENT)
Dept: PHYSICAL THERAPY | Facility: CLINIC | Age: 65
End: 2020-01-03
Payer: COMMERCIAL

## 2020-01-03 DIAGNOSIS — R29.898 WEAKNESS OF RIGHT HIP: ICD-10-CM

## 2020-01-03 DIAGNOSIS — R53.81 PHYSICAL DECONDITIONING: ICD-10-CM

## 2020-01-03 PROCEDURE — 97110 THERAPEUTIC EXERCISES: CPT | Mod: GP | Performed by: PHYSICAL THERAPIST

## 2020-01-03 PROCEDURE — 97530 THERAPEUTIC ACTIVITIES: CPT | Mod: GP | Performed by: PHYSICAL THERAPIST

## 2020-01-17 ENCOUNTER — THERAPY VISIT (OUTPATIENT)
Dept: PHYSICAL THERAPY | Facility: CLINIC | Age: 65
End: 2020-01-17
Payer: COMMERCIAL

## 2020-01-17 DIAGNOSIS — R29.898 WEAKNESS OF RIGHT HIP: ICD-10-CM

## 2020-01-17 DIAGNOSIS — R53.81 PHYSICAL DECONDITIONING: ICD-10-CM

## 2020-01-17 PROCEDURE — 97110 THERAPEUTIC EXERCISES: CPT | Mod: GP | Performed by: PHYSICAL THERAPIST

## 2020-01-17 PROCEDURE — 97112 NEUROMUSCULAR REEDUCATION: CPT | Mod: GP | Performed by: PHYSICAL THERAPIST

## 2020-01-31 ENCOUNTER — THERAPY VISIT (OUTPATIENT)
Dept: PHYSICAL THERAPY | Facility: CLINIC | Age: 65
End: 2020-01-31
Payer: COMMERCIAL

## 2020-01-31 DIAGNOSIS — R29.898 WEAKNESS OF RIGHT HIP: ICD-10-CM

## 2020-01-31 DIAGNOSIS — R53.81 PHYSICAL DECONDITIONING: ICD-10-CM

## 2020-01-31 PROCEDURE — 97116 GAIT TRAINING THERAPY: CPT | Mod: GP | Performed by: PHYSICAL THERAPIST

## 2020-01-31 PROCEDURE — 97110 THERAPEUTIC EXERCISES: CPT | Mod: GP | Performed by: PHYSICAL THERAPIST

## 2020-02-21 ENCOUNTER — THERAPY VISIT (OUTPATIENT)
Dept: PHYSICAL THERAPY | Facility: CLINIC | Age: 65
End: 2020-02-21
Payer: COMMERCIAL

## 2020-02-21 DIAGNOSIS — R29.898 WEAKNESS OF RIGHT HIP: ICD-10-CM

## 2020-02-21 DIAGNOSIS — R53.81 PHYSICAL DECONDITIONING: ICD-10-CM

## 2020-02-21 PROCEDURE — 97110 THERAPEUTIC EXERCISES: CPT | Mod: GP | Performed by: PHYSICAL THERAPIST

## 2020-02-21 PROCEDURE — 97112 NEUROMUSCULAR REEDUCATION: CPT | Mod: GP | Performed by: PHYSICAL THERAPIST

## 2020-03-23 ENCOUNTER — TELEPHONE (OUTPATIENT)
Dept: FAMILY MEDICINE | Facility: CLINIC | Age: 65
End: 2020-03-23

## 2020-03-23 DIAGNOSIS — E78.5 HYPERLIPIDEMIA LDL GOAL <100: ICD-10-CM

## 2020-03-23 DIAGNOSIS — E11.42 CONTROLLED TYPE 2 DIABETES MELLITUS WITH DIABETIC POLYNEUROPATHY, WITHOUT LONG-TERM CURRENT USE OF INSULIN (H): Primary | ICD-10-CM

## 2020-03-23 DIAGNOSIS — I10 HYPERTENSION GOAL BP (BLOOD PRESSURE) < 140/90: ICD-10-CM

## 2020-03-23 NOTE — TELEPHONE ENCOUNTER
Refills provided for one month    In one month , needs pre-visit laboratory studies and telephone MD visit 2-3 days later    Cecilio Pro MD

## 2020-03-23 NOTE — TELEPHONE ENCOUNTER
"Routing refill request to provider for review/approval because:  Labs not current:  LDL    Requested Prescriptions   Pending Prescriptions Disp Refills     metFORMIN (GLUCOPHAGE) 1000 MG tablet [Pharmacy Med Name: METFORMIN 1000MG TABLETS] 60 tablet      Sig: TAKE 1 TABLET BY MOUTH TWICE DAILY WITH MEALS       Biguanide Agents Failed - 3/23/2020  7:31 AM        Failed - Patient has documented LDL within the past 12 mos.     Recent Labs   Lab Test 09/25/18  0901   LDL 18             Passed - Blood pressure less than 140/90 in past 6 months     BP Readings from Last 3 Encounters:   12/17/19 114/68   11/05/19 124/76   10/14/19 128/70                 Passed - Patient has had a Microalbumin in the past 15 mos.     Recent Labs   Lab Test 08/22/19  1734   MICROL 27   UMALCR 11.97             Passed - Patient is age 10 or older        Passed - Patient has documented A1c within the specified period of time.     If HgbA1C is 8 or greater, it needs to be on file within the past 3 months.  If less than 8, must be on file within the past 6 months.     Recent Labs   Lab Test 10/14/19  0900   A1C 6.8*             Passed - Patient's CR is NOT>1.4 OR Patient's EGFR is NOT<45 within past 12 mos.     Recent Labs   Lab Test 10/14/19  0900   GFRESTIMATED >90   GFRESTBLACK >90       Recent Labs   Lab Test 10/14/19  0900   CR 0.70             Passed - Patient does NOT have a diagnosis of CHF.        Passed - Medication is active on med list        Passed - Recent (6 mo) or future (30 days) visit within the authorizing provider's specialty     Patient had office visit in the last 6 months or has a visit in the next 30 days with authorizing provider or within the authorizing provider's specialty.  See \"Patient Info\" tab in inbasket, or \"Choose Columns\" in Meds & Orders section of the refill encounter.               Janet Pyle RN  "

## 2020-03-24 NOTE — TELEPHONE ENCOUNTER
Please call and schedule lab-only appointment and then a telephone visit 2-3 days later.     Janet Pyle RN

## 2020-03-25 DIAGNOSIS — E11.42 CONTROLLED TYPE 2 DIABETES MELLITUS WITH DIABETIC POLYNEUROPATHY, WITHOUT LONG-TERM CURRENT USE OF INSULIN (H): ICD-10-CM

## 2020-03-25 DIAGNOSIS — I10 HYPERTENSION GOAL BP (BLOOD PRESSURE) < 140/90: ICD-10-CM

## 2020-03-25 DIAGNOSIS — E78.5 HYPERLIPIDEMIA LDL GOAL <100: ICD-10-CM

## 2020-03-25 LAB
ANION GAP SERPL CALCULATED.3IONS-SCNC: 7 MMOL/L (ref 3–14)
BUN SERPL-MCNC: 19 MG/DL (ref 7–30)
CALCIUM SERPL-MCNC: 8.8 MG/DL (ref 8.5–10.1)
CHLORIDE SERPL-SCNC: 107 MMOL/L (ref 94–109)
CHOLEST SERPL-MCNC: 133 MG/DL
CO2 SERPL-SCNC: 22 MMOL/L (ref 20–32)
CREAT SERPL-MCNC: 0.78 MG/DL (ref 0.66–1.25)
GFR SERPL CREATININE-BSD FRML MDRD: >90 ML/MIN/{1.73_M2}
GLUCOSE SERPL-MCNC: 202 MG/DL (ref 70–99)
HBA1C MFR BLD: 8.1 % (ref 0–5.6)
HDLC SERPL-MCNC: 32 MG/DL
LDLC SERPL CALC-MCNC: 48 MG/DL
NONHDLC SERPL-MCNC: 101 MG/DL
POTASSIUM SERPL-SCNC: 4.1 MMOL/L (ref 3.4–5.3)
SODIUM SERPL-SCNC: 136 MMOL/L (ref 133–144)
TRIGL SERPL-MCNC: 263 MG/DL

## 2020-03-25 PROCEDURE — 36415 COLL VENOUS BLD VENIPUNCTURE: CPT | Performed by: INTERNAL MEDICINE

## 2020-03-25 PROCEDURE — 80061 LIPID PANEL: CPT | Performed by: INTERNAL MEDICINE

## 2020-03-25 PROCEDURE — 80048 BASIC METABOLIC PNL TOTAL CA: CPT | Performed by: INTERNAL MEDICINE

## 2020-03-25 PROCEDURE — 83036 HEMOGLOBIN GLYCOSYLATED A1C: CPT | Performed by: INTERNAL MEDICINE

## 2020-04-02 ENCOUNTER — VIRTUAL VISIT (OUTPATIENT)
Dept: INTERNAL MEDICINE | Facility: CLINIC | Age: 65
End: 2020-04-02
Payer: MEDICARE

## 2020-04-02 DIAGNOSIS — E78.5 HYPERLIPIDEMIA LDL GOAL <100: Primary | ICD-10-CM

## 2020-04-02 DIAGNOSIS — E11.42 CONTROLLED TYPE 2 DIABETES MELLITUS WITH DIABETIC POLYNEUROPATHY, WITHOUT LONG-TERM CURRENT USE OF INSULIN (H): ICD-10-CM

## 2020-04-02 DIAGNOSIS — E66.01 MORBID OBESITY (H): ICD-10-CM

## 2020-04-02 PROCEDURE — 99442 ZZC PHYSICIAN TELEPHONE EVALUATION 11-20 MIN: CPT | Performed by: INTERNAL MEDICINE
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
## 2020-04-15 PROBLEM — R53.81 PHYSICAL DECONDITIONING: Status: RESOLVED | Noted: 2019-12-23 | Resolved: 2020-04-15

## 2020-04-15 PROBLEM — R29.898 WEAKNESS OF RIGHT HIP: Status: RESOLVED | Noted: 2019-12-23 | Resolved: 2020-04-15

## 2020-04-15 NOTE — PROGRESS NOTES
Patient is discharged from PT.  Please refer to SOAP note dated 2/21/20 for last known functional status as well as final objective/subjective values.

## 2020-04-19 DIAGNOSIS — E11.42 CONTROLLED TYPE 2 DIABETES MELLITUS WITH DIABETIC POLYNEUROPATHY, WITHOUT LONG-TERM CURRENT USE OF INSULIN (H): ICD-10-CM

## 2020-04-22 ENCOUNTER — MYC MEDICAL ADVICE (OUTPATIENT)
Dept: ORTHOPEDICS | Facility: CLINIC | Age: 65
End: 2020-04-22

## 2020-04-22 DIAGNOSIS — Z96.641 STATUS POST TOTAL REPLACEMENT OF RIGHT HIP: ICD-10-CM

## 2020-04-22 RX ORDER — CLINDAMYCIN HCL 150 MG
CAPSULE ORAL
Qty: 3 CAPSULE | Refills: 1 | Status: SHIPPED | OUTPATIENT
Start: 2020-04-22 | End: 2020-12-18

## 2020-04-22 NOTE — PROGRESS NOTES
"Florian Lawrence is a 65 year old male who is being evaluated via a billable telephone visit.      The patient has been notified of following:     \"This telephone visit will be conducted via a call between you and your physician/provider. We have found that certain health care needs can be provided without the need for a physical exam.  This service lets us provide the care you need with a short phone conversation.  If a prescription is necessary we can send it directly to your pharmacy.  If lab work is needed we can place an order for that and you can then stop by our lab to have the test done at a later time.    Telephone visits are billed at different rates depending on your insurance coverage. During this emergency period, for some insurers they may be billed the same as an in-person visit.  Please reach out to your insurance provider with any questions.    If during the course of the call the physician/provider feels a telephone visit is not appropriate, you will not be charged for this service.\"    Patient has given verbal consent for Telephone visit?  Yes    How would you like to obtain your AVS? Edith    Phone call duration: 22 minutes    SUBJECTIVE:   Florian Lawrence for follow up of status post right total hip arthroplasty and hardware removal from 10/28/2019. It has been approximately almost 6 months since surgery.     He reports that he was walking great, and doing very well until 4/19, and got out of a chair and had significant pain in the hip.  He noted that the incision line was red  Pain near the incision line into the buttocks.  Lateral   Reports being back to how he was preop  Limps when first gets up and walking.  Stairs are difficult  Has had a low grade fever of 99 and felt chills/ warmth.   Temp then went up to 101.3 6 days ago. Had a low grade temp after that.  Has been taking his temp frequently since then, and no increased temps the last 5 days.  Leg feels weaker.  No history of low back " "problems  Some loss of appetite last week, but is back now.  No general fatigue.  He was told by [employer] that he \"may\" have covid 19 based on symptoms.    Had drainage for a few days postoperative, and took keflex.    Review of Systems:  Constitutional/General:see above  Integumentary/Skin: Negative for worrisome rashes, moles, or lesions  Neuro: Negative for weakness, dizziness, or paresthesias   Psychiatric: negative for changes in mood or affect    OBJECTIVE:  He sounds well.  No increased work of breathing.  Normal thought patterns    ASSESSMENT:   Possible acute infection in his total hip arthroplasty.  He had hardware removal with a washer left behind.  The fevers could be Covid 19, but the coincidence with the pain is very concerning, along with the redness.    PLAN:   ESR, CRP, CBC with diff ordered  Covid 19 screening would be nice if possible   If all tests are within normal limits, then I would be less concerned, and maybe this is some greater trochanteric bursitis.  If they are not normal, then he should be seen later this week for exam and xrays.  Another option, to avoid a clinic visit, would be to go straight to an aspiration for cell count, cultures, gm stain, and alpha defensin if the labs are abnormal.  With an acute onset like this, it may be possible to salvage the hip with an acute clean out rather than 2-stage revision.      MARITA Curry MD  Dept. Orthopedic Surgery  [facility]       "

## 2020-04-22 NOTE — TELEPHONE ENCOUNTER
Pt needs r/f of abx post surg hip replacement. Erx sent    Monique Ramey RN Specialty Triage 4/22/2020 8:19 AM

## 2020-04-23 ENCOUNTER — VIRTUAL VISIT (OUTPATIENT)
Dept: FAMILY MEDICINE | Facility: OTHER | Age: 65
End: 2020-04-23

## 2020-04-23 NOTE — PROGRESS NOTES
"Date: 2020 12:15:38  Clinician: Garrett Mueller  Clinician NPI: [id number]  Patient: Florian Lawrence  Patient : 1955  Patient Address: [facility] [address] , [place] [address]  Patient Phone: [phone]  Visit Protocol: URI  Patient Summary:  Florian is a 65 year old ( : 1955 ) male who initiated a Visit for COVID-19 (Coronavirus) evaluation and screening. When asked the question \"Please sign me up to receive news, health information and promotions. \", Florian responded \"No\".    Florian states his symptoms started gradually 3-4 days ago. After his symptoms started, they improved and then got worse again.   His symptoms consist of a cough, nasal congestion, malaise, myalgia, and a headache.   Symptom details     Nasal secretions: The color of his mucus is clear.    Cough: Florian coughs a few times an hour and his cough is not more bothersome at night. Phlegm does not come into his throat when he coughs. He does not believe his cough is caused by post-nasal drip.     Headache: He states the headache is mild (1-3 on a 10 point pain scale).      Florian denies having rhinitis, enlarged lymph nodes, chills, diarrhea, facial pain or pressure, sore throat, ear pain, fever, vomiting, nausea, teeth pain, wheezing, anosmia, and ageusia. He also denies having a sinus infection within the past year and having recent facial or sinus surgery in the past 60 days. He is not experiencing dyspnea.   Precipitating events  He has not recently been exposed to someone with influenza. Florian has not been in close contact with any high risk individuals.   Pertinent COVID-19 (Coronavirus) information  Florian has not traveled internationally or to the areas where COVID-19 (Coronavirus) is widespread, including cruise ship travel in the last 14 days before the start of his symptoms.   Florian does not work or volunteer as healthcare worker or a  and does not work or volunteer in a healthcare facility.   He " does not live with a healthcare worker.   Florian has not had a close contact with a laboratory-confirmed COVID-19 patient within 14 days of symptom onset. He also has not had a close contact with a suspected COVID-19 patient within 14 days of symptom onset.   Pertinent medical history  Florian has taken an antibiotic medication in the past month. Antibiotic details as reported by the patient (free text): Clindamycin 150 mg 3 Capsules  prior to Dental procedure March 23   Florian does not need a return to work/school note.   Weight: 250 lbs   Florian does not smoke or use smokeless tobacco.   Additional information as reported by the patient (free text): Hip Replacement Oct. 28 2019.   Weight: 250 lbs    MEDICATIONS: metformin oral, glimepiride oral, losartan oral, simvastatin oral, Basaglar KwikPen U-100 Insulin subcutaneous, ALLERGIES: Penicillins, lisinopril  Clinician Response:  Dear Florian,    Dear Florian  Your symptoms show that you may have coronavirus (COVID-19). This illness can cause fever, cough and trouble breathing. Many people get a mild case and get better on their own. Some people can get very sick.&nbsp; I recommend taking Tylenol as needed for pain or fever. &nbsp; I would be seen in person if you develop worsening shortness of breath, cough or fever.&nbsp; I would quarantine yourself as described below.&nbsp;  Will I be tested for COVID-19?  Because the virus is spreading, we are no longer testing most patients. You may request testing if:   You are very ill. For example, you're on chemotherapy, dialysis or home hospice care. (Contact your specialty clinic or program.)   You live in a nursing home or other long-term care facility. (Talk to your nurse manager or medical director.)   You're a health care worker. (Contact your employee health office.)   How can I protect others?  Without a test, we can't know for sure that you have COVID-19. For safety, it's very important to follow these rules.   First, stay home and away from others (self-isolate) until:   You've had no fever---and no medicine that reduces fever---for 3 full days (72 hours). And...    Your other symptoms have gotten better. For example, your cough or breathing has improved. And...   At least 7 days have passed since your symptoms started.   During this time:   Don't go to work, school or anywhere else.    Stay away from others in your home. No hugging, kissing or shaking hands.   Don't let anyone visit.   Cover your mouth and nose with a mask, tissue or wash cloth to avoid spreading germs.   Wash your hands and face often. Use soap and water.   How can I take care of myself?   1.Take Tylenol (acetaminophen) for fever or pain. If you have liver or kidney problems, ask your family doctor if it's okay to take Tylenol.        Adults can take either:    650 mg (two 325 mg pills) every 4 to 6 hours, or...   1,000 mg (two 500 mg pills) every 8 hours as needed.    Note: Don't take more than 3,000 mg in one day.   For children, check the Tylenol bottle for the right dose. The dose is based on the child's age or weight.   2.If you have other health problems (like cancer, heart failure, an organ transplant or severe kidney disease): Call your specialty clinic if you don't feel better in the next 2 days.       3.Know when to call 911: If your breathing is so bad that it keeps you from doing normal activities, call 911 or go to the emergency room. Tell them that you've been staying home and may have COVID-19.   Where can I get more information?  To learn more about COVID-19 and how to care for yourself at home, please visit the CDC website at https://www.cdc.gov/coronavirus/2019-ncov/about/steps-when-sick.html.  For more about your care at [facility], please visit [email or web address]         Diagnosis: Cough  Diagnosis ICD: R05

## 2020-04-28 ENCOUNTER — VIRTUAL VISIT (OUTPATIENT)
Dept: ORTHOPEDICS | Facility: CLINIC | Age: 65
End: 2020-04-28
Payer: MEDICARE

## 2020-04-28 DIAGNOSIS — Z96.649 PAIN DUE TO TOTAL HIP REPLACEMENT, INITIAL ENCOUNTER (H): Primary | ICD-10-CM

## 2020-04-28 DIAGNOSIS — T84.84XA PAIN DUE TO TOTAL HIP REPLACEMENT, INITIAL ENCOUNTER (H): Primary | ICD-10-CM

## 2020-04-28 DIAGNOSIS — R50.82 POST-PROCEDURAL FEVER: ICD-10-CM

## 2020-04-28 PROCEDURE — 99443 ZZC PHYSICIAN TELEPHONE EVALUATION 21-30 MIN: CPT | Performed by: ORTHOPAEDIC SURGERY

## 2020-04-29 DIAGNOSIS — Z96.649 PAIN DUE TO TOTAL HIP REPLACEMENT, INITIAL ENCOUNTER (H): Primary | ICD-10-CM

## 2020-04-29 DIAGNOSIS — Z96.649 PAIN DUE TO TOTAL HIP REPLACEMENT, INITIAL ENCOUNTER (H): ICD-10-CM

## 2020-04-29 DIAGNOSIS — R50.82 POST-PROCEDURAL FEVER: ICD-10-CM

## 2020-04-29 DIAGNOSIS — T84.84XD PAIN IN HIP REGION AFTER TOTAL HIP REPLACEMENT, SUBSEQUENT ENCOUNTER: Primary | ICD-10-CM

## 2020-04-29 DIAGNOSIS — I10 HYPERTENSION GOAL BP (BLOOD PRESSURE) < 140/90: ICD-10-CM

## 2020-04-29 DIAGNOSIS — T84.84XA PAIN DUE TO TOTAL HIP REPLACEMENT, INITIAL ENCOUNTER (H): ICD-10-CM

## 2020-04-29 DIAGNOSIS — T84.84XA PAIN DUE TO TOTAL HIP REPLACEMENT, INITIAL ENCOUNTER (H): Primary | ICD-10-CM

## 2020-04-29 DIAGNOSIS — Z96.649 PAIN IN HIP REGION AFTER TOTAL HIP REPLACEMENT, SUBSEQUENT ENCOUNTER: Primary | ICD-10-CM

## 2020-04-29 LAB
BASOPHILS # BLD AUTO: 0 10E9/L (ref 0–0.2)
BASOPHILS NFR BLD AUTO: 0.3 %
CRP SERPL-MCNC: 73.9 MG/L (ref 0–8)
DIFFERENTIAL METHOD BLD: ABNORMAL
EOSINOPHIL # BLD AUTO: 0.1 10E9/L (ref 0–0.7)
EOSINOPHIL NFR BLD AUTO: 0.9 %
ERYTHROCYTE [DISTWIDTH] IN BLOOD BY AUTOMATED COUNT: 13 % (ref 10–15)
ERYTHROCYTE [SEDIMENTATION RATE] IN BLOOD BY WESTERGREN METHOD: 68 MM/H (ref 0–20)
HCT VFR BLD AUTO: 40 % (ref 40–53)
HGB BLD-MCNC: 13.8 G/DL (ref 13.3–17.7)
LYMPHOCYTES # BLD AUTO: 0.7 10E9/L (ref 0.8–5.3)
LYMPHOCYTES NFR BLD AUTO: 9.9 %
MCH RBC QN AUTO: 30.7 PG (ref 26.5–33)
MCHC RBC AUTO-ENTMCNC: 34.5 G/DL (ref 31.5–36.5)
MCV RBC AUTO: 89 FL (ref 78–100)
MONOCYTES # BLD AUTO: 0.7 10E9/L (ref 0–1.3)
MONOCYTES NFR BLD AUTO: 9.8 %
NEUTROPHILS # BLD AUTO: 5.8 10E9/L (ref 1.6–8.3)
NEUTROPHILS NFR BLD AUTO: 79.1 %
PLATELET # BLD AUTO: 367 10E9/L (ref 150–450)
RBC # BLD AUTO: 4.49 10E12/L (ref 4.4–5.9)
WBC # BLD AUTO: 7.4 10E9/L (ref 4–11)

## 2020-04-29 PROCEDURE — 85652 RBC SED RATE AUTOMATED: CPT | Performed by: ORTHOPAEDIC SURGERY

## 2020-04-29 PROCEDURE — 86769 SARS-COV-2 COVID-19 ANTIBODY: CPT | Mod: 90 | Performed by: ORTHOPAEDIC SURGERY

## 2020-04-29 PROCEDURE — 85025 COMPLETE CBC W/AUTO DIFF WBC: CPT | Performed by: ORTHOPAEDIC SURGERY

## 2020-04-29 PROCEDURE — 86140 C-REACTIVE PROTEIN: CPT | Performed by: ORTHOPAEDIC SURGERY

## 2020-04-29 PROCEDURE — 36415 COLL VENOUS BLD VENIPUNCTURE: CPT | Performed by: ORTHOPAEDIC SURGERY

## 2020-04-29 PROCEDURE — 99000 SPECIMEN HANDLING OFFICE-LAB: CPT | Performed by: ORTHOPAEDIC SURGERY

## 2020-04-29 RX ORDER — LOSARTAN POTASSIUM 25 MG/1
TABLET ORAL
Qty: 180 TABLET | Refills: 1 | Status: SHIPPED | OUTPATIENT
Start: 2020-04-29 | End: 2020-09-30

## 2020-04-30 NOTE — TELEPHONE ENCOUNTER
Keren ro. If they do the aspiration, they send the fluids to [facility], can not send to [facility].     [phone]

## 2020-05-01 NOTE — TELEPHONE ENCOUNTER
I called [facility] and confirmed these labs will be viewable thru [facility].  Dr Curry aware and will f/u with patient with treatment plan.  Henok Jackson OPA

## 2020-05-02 LAB
COVID-19 SPIKE RBD ABY TITER: NORMAL
COVID-19 SPIKE RBD ABY: NEGATIVE

## 2020-05-05 ENCOUNTER — TELEPHONE (OUTPATIENT)
Dept: ORTHOPEDICS | Facility: CLINIC | Age: 65
End: 2020-05-05

## 2020-05-05 DIAGNOSIS — Z96.649 PAIN DUE TO TOTAL HIP REPLACEMENT, SEQUELA: Primary | ICD-10-CM

## 2020-05-05 DIAGNOSIS — T84.84XS PAIN DUE TO TOTAL HIP REPLACEMENT, SEQUELA: Primary | ICD-10-CM

## 2020-05-05 NOTE — TELEPHONE ENCOUNTER
Called and talked with patient, we rescheduled his hip aspirate to tomorrow with Dr. Terry.  Orders are placed for cultures.  We will keep his appointment with Dr. Curry for the week after to discuss results.  Hina To RN

## 2020-05-05 NOTE — TELEPHONE ENCOUNTER
[facility] Call Center    Phone Message    May a detailed message be left on voicemail: yes     Reason for Call: Other: Pt rescheduled today's appt due to his test being rescheduled until 5/11. Pt reshceduled appt with Dr. Baker on 5/12. Pt would like a call back if this does not give enough time for Dr. Baker to get result of test. Please advise.      Action Taken: Message routed to:  Adult Clinics: Orthopedics p [id number]    Travel Screening: Not Applicable

## 2020-05-06 ENCOUNTER — OFFICE VISIT (OUTPATIENT)
Dept: ORTHOPEDICS | Facility: CLINIC | Age: 65
End: 2020-05-06
Payer: MEDICARE

## 2020-05-06 VITALS
SYSTOLIC BLOOD PRESSURE: 120 MMHG | WEIGHT: 257 LBS | TEMPERATURE: 98.2 F | BODY MASS INDEX: 35.84 KG/M2 | DIASTOLIC BLOOD PRESSURE: 74 MMHG

## 2020-05-06 DIAGNOSIS — M25.451 RIGHT HIP JOINT EFFUSION: Primary | ICD-10-CM

## 2020-05-06 LAB
GRAM STN SPEC: NORMAL
Lab: NORMAL
SPECIMEN SOURCE: NORMAL

## 2020-05-06 PROCEDURE — 87077 CULTURE AEROBIC IDENTIFY: CPT | Performed by: PREVENTIVE MEDICINE

## 2020-05-06 PROCEDURE — 83516 IMMUNOASSAY NONANTIBODY: CPT | Mod: 90 | Performed by: PREVENTIVE MEDICINE

## 2020-05-06 PROCEDURE — 86140 C-REACTIVE PROTEIN: CPT | Mod: 90 | Performed by: PREVENTIVE MEDICINE

## 2020-05-06 PROCEDURE — 87075 CULTR BACTERIA EXCEPT BLOOD: CPT | Performed by: PREVENTIVE MEDICINE

## 2020-05-06 PROCEDURE — 87181 SC STD AGAR DILUTION PER AGT: CPT | Performed by: PREVENTIVE MEDICINE

## 2020-05-06 PROCEDURE — 87070 CULTURE OTHR SPECIMN AEROBIC: CPT | Performed by: PREVENTIVE MEDICINE

## 2020-05-06 PROCEDURE — 89051 BODY FLUID CELL COUNT: CPT | Performed by: PREVENTIVE MEDICINE

## 2020-05-06 PROCEDURE — 99207 ZZC NO CHARGE INJECTABLE MED/DRUG: CPT | Performed by: PREVENTIVE MEDICINE

## 2020-05-06 PROCEDURE — 87205 SMEAR GRAM STAIN: CPT | Performed by: PREVENTIVE MEDICINE

## 2020-05-06 PROCEDURE — 84311 SPECTROPHOTOMETRY: CPT | Mod: 90 | Performed by: PREVENTIVE MEDICINE

## 2020-05-06 PROCEDURE — 87186 SC STD MICRODIL/AGAR DIL: CPT | Mod: XU | Performed by: PREVENTIVE MEDICINE

## 2020-05-06 PROCEDURE — 20611 DRAIN/INJ JOINT/BURSA W/US: CPT | Mod: RT | Performed by: PREVENTIVE MEDICINE

## 2020-05-06 PROCEDURE — 99000 SPECIMEN HANDLING OFFICE-LAB: CPT | Performed by: PREVENTIVE MEDICINE

## 2020-05-06 PROCEDURE — 87076 CULTURE ANAEROBE IDENT EACH: CPT | Performed by: PREVENTIVE MEDICINE

## 2020-05-06 PROCEDURE — 99207 ZZC DROP WITH A PROCEDURE: CPT | Performed by: PREVENTIVE MEDICINE

## 2020-05-06 NOTE — PROGRESS NOTES
Large Joint Injection/Arthocentesis: R hip joint    Date/Time: 5/6/2020 10:36 AM  Performed by: Emory Terry MD  Authorized by: Emory Terry MD     Indications:  Pain  Needle Size:  22 G  Guidance: ultrasound    Approach:  Anterior  Location:  Hip      Site:  R hip joint  Aspirate amount (mL):  1  Aspirate:  Bloody  Outcome:  Tolerated well, no immediate complications  Procedure discussed: discussed risks, benefits, and alternatives    Consent Given by:  Patient  Timeout: timeout called immediately prior to procedure    Prep: patient was prepped and draped in usual sterile fashion

## 2020-05-06 NOTE — LETTER
5/6/2020         RE: Florian Lawrence  [address] [facility] [address]        Dear Colleague,    Thank you for referring your patient, Florian Lawrence, to the [facility]. Please see a copy of my visit note below.          [facility] FOLLOW-UP VISIT 5/6/2020    Florian Lawrence's chief complaint for this visit includes:  Chief Complaint   Patient presents with     RECHECK     right hip aspiration      PCP: Cecilio Pro    Referring Provider:  No referring provider defined for this encounter.    /74   Temp 98.2  F (36.8  C)   Wt 116.6 kg (257 lb)   BMI 35.84 kg/m    Data Unavailable       Interval History:     Follow up reason: right hip      Medical History:    Any recent changes to your medical history? No    Any new medication prescribed since last visit? No    Review of Systems:    Do you have fever, chills, weight loss? No    Do you have any vision problems? No    Do you have any chest pain or edema? No    Do you have any shortness of breath or wheezing?  No    Do you have stomach problems? No    Do you have any urinary track issues? No    Do you have any numbness or focal weakness? No    Do you have diabetes? No    Do you have problems with bleeding or clotting? No    Do you have an rashes or other skin lesions? No    Florian presents for a right hip aspiration, had hip replacement surgery with Dr Curry last fall and there is concern for possible hip infection  Will perform aspiration and send for labs that Dr Curry already ordered to screen for infection.      Large Joint Injection/Arthocentesis: R hip joint    Date/Time: 5/6/2020 10:37 AM  Performed by: Emory Terry MD  Authorized by: Emory Terry MD     Indications:  Diagnostic evaluation  Needle Size:  20 G  Guidance: ultrasound    Approach:  Anterior  Location:  Hip      Site:  R hip joint  Aspirate amount (mL):  2.5  Aspirate:  Blood-tinged  Aspirate analysis: sent for lab  analysis    Outcome:  Tolerated well, no immediate complications  Timeout: timeout called immediately prior to procedure    Prep: patient was prepped and draped in usual sterile fashion        Diagnosis: right hip effusion s/p total hip replacement    Intraarticular Hip aspiration- Ultrasound Guided  The patient was informed of the risks and the benefits of the procedure and a written consent was signed.  The patient s right hip was prepped with chlorhexidine in sterile fashion.   aspiration was performed using sterile technique.  Under ultrasound guidance a 3.5-inch 20-gauge needle was used to enter the femoracetabular joint effusion.  Anterior approach was used, needle placement was visualized and documented with ultrasound.  Ultrasound visualization was necessary due to decreased joint space in the setting of replaced hip and identifying effusion. aspiration performed long axis to the probe.  Effusion was identified, and 2.5 ml of blood tinged aspirate was obtained.  Images were permanently stored for the patient's record.  There were no complications. The patient tolerated the procedure well. There was negligible bleeding.   The patient was instructed to ice the hip upon leaving clinic and refrain from overuse over the next 3 days.   The patient was instructed to call or go to the emergency room with any unusual pain, swelling, redness, or if otherwise concerned.      Large Joint Injection/Arthocentesis: R hip joint    Date/Time: 5/6/2020 10:36 AM  Performed by: Emory Terry MD  Authorized by: Emory Terry MD     Indications:  Pain  Needle Size:  22 G  Guidance: ultrasound    Approach:  Anterior  Location:  Hip      Site:  R hip joint  Aspirate amount (mL):  1  Aspirate:  Bloody  Outcome:  Tolerated well, no immediate complications  Procedure discussed: discussed risks, benefits, and alternatives    Consent Given by:  Patient  Timeout: timeout called immediately prior to procedure    Prep:  patient was prepped and draped in usual sterile fashion            Again, thank you for allowing me to participate in the care of your patient.        Sincerely,        Emory Terry MD

## 2020-05-06 NOTE — PROGRESS NOTES
SUBJECTIVE:   Florian Lawrence is here in follow up of his right hip, possible septic total hip arthroplasty.  His pain got a lot better last week.  No more fever/chills.    He did have his right hip aspirated with Dr. Terry on 5/6/20.  A very limited amount of fluid was obtained, and swab cultures of the fluid were sent    Labs: his APR's were elevated, thus aspiration ordered.  Synvoasure:  Still pending?  Cell count: not enough fluid apparently for a full cbc:     5/6/2020 10:30   Body Fluid Analysis Source Right Hip   Color Fluid Slightly Bloody   Appearance Fluid Cloudy   WBC Fluid 15     Bacteria cultured, mixed, consistent with skin lea.  Possible contaminant  Fungal not done  Culture Micro  Abnormal     Light growth   Enterococcus faecalis   not isolated or reported on routine culture   P       Culture Micro  Culture in progress P     Culture Micro  Critical Value/Significant Value, preliminary result only, called to and read back by   Dr. Terry on 5.8.20 at 1320. bw  P       Resulting Agency  [facility]    Susceptibility      Enterococcus faecalis      ELVIA (Preliminary)      AMPICILLIN  <=2.0 ug/mL  Sensitive      VANCOMYCIN  1.0 ug/mL  Sensitive            Culture Micro  Abnormal     Light growth   Staphylococcus hominis     Culture Micro  Critical Value/Significant Value, preliminary result only, called to and read back by   Dr. Terry on 5.8.20 at 1320. bw    Resulting Agency  [facility]    Susceptibility      Staphylococcus hominis      ELVIA      CIPROFLOXACIN  <=0.5 ug/mL  Sensitive      CLINDAMYCIN  <=0.25 ug/mL  Sensitive      ERYTHROMYCIN  <=0.25 ug/mL  Sensitive      GENTAMICIN  <=0.5 ug/mL  Sensitive      LEVOFLOXACIN  <=0.12 ug/mL  Sensitive      OXACILLIN   Resistant      TETRACYCLINE  <=1 ug/mL  Sensitive      VANCOMYCIN  1 ug/mL  Sensitive              Present symptoms: still not back to where he was before the pain started,  and has stiffness getting up from a chair or bed, but otherwise has no pain walking.     Review of Systems:  Constitutional/General:  Negative for fever, chills, change in weight  Resp:  He reports a CHRONIC COUGH, and coughing spells over the last couple of months.  Integumentary/Skin: Negative for worrisome rashes, moles, or lesions  Neuro: Negative for weakness, dizziness, or paresthesias   Psychiatric: negative for changes in mood or affect    OBJECTIVE:  Physical Exam:  /82 (BP Location: Left arm, Patient Position: Sitting, Cuff Size: Adult Regular)   Pulse 80   Temp 98.1  F (36.7  C)   SpO2 97%   General Appearance: healthy, alert and no distress   Skin: area of incision has no erythema or warmth.  Neuro: Normal strength and tone, mentation intact and speech normal  Vascular: good pulses, and capillary refill   Lymph: no lymphadenopathy   Psych:  mentation appears normal and affect normal/bright  Resp: no increased work of breathing    Right Hip Exam:  Tender: non-tender   Hip range of motion:    Flexion: non painful   Internal Rotation: 20 degrees with some pain   External Rotation: 45 degrees   Abduction: 30 degrees  Strength: full strength  Leg lengths: not tested  Special tests:  Trendelenburg negative.    ASSESSMENT:   Encounter Diagnosis   Name Primary?     Pain due to total hip replacement, sequela Yes      I think that the bacteria in the aspiration are skin contaminants.  We could attribute his pain 3 weeks ago to a pulled muscle, and the febrile episodes with elevated APR's to an upper respiratory issue, and he continues to have a cough.  With the coincidence of these symptoms, that theory seems a bit far-fetched.  But the lack of fluid and the resolution of symptoms support a theory other than a septic total hip arthroplasty.    PLAN:   We will await the Synovasure results.  If +, then 2 stage vs 1 stage revision of the hip would be planned.  If synovasure is negative, then monitor his  symptoms closely.  If no systemic issues and no pain, follow up at 1 year postoperative with xrays.  If his hip is painful, and not improved a lot in 6 weeks. He should return, and repeat aspiration, with IR (instead of ultrasound guided) would be done.  If the hip starts worsening again, then I would consider the IR aspiration be repeated.    Whenever he returns. New xrays will be done.    I suggested he consult with Dr. Pro about his chronic cough.      MARITA Curry MD  Dept. Orthopedic Surgery  [facility]

## 2020-05-06 NOTE — PROGRESS NOTES
[facility] FOLLOW-UP VISIT 5/6/2020    Florian Lawrence's chief complaint for this visit includes:  Chief Complaint   Patient presents with     RECHECK     right hip aspiration      PCP: Cecilio Pro    Referring Provider:  No referring provider defined for this encounter.    /74   Temp 98.2  F (36.8  C)   Wt 116.6 kg (257 lb)   BMI 35.84 kg/m    Data Unavailable       Interval History:     Follow up reason: right hip      Medical History:    Any recent changes to your medical history? No    Any new medication prescribed since last visit? No    Review of Systems:    Do you have fever, chills, weight loss? No    Do you have any vision problems? No    Do you have any chest pain or edema? No    Do you have any shortness of breath or wheezing?  No    Do you have stomach problems? No    Do you have any urinary track issues? No    Do you have any numbness or focal weakness? No    Do you have diabetes? No    Do you have problems with bleeding or clotting? No    Do you have an rashes or other skin lesions? No    Florian presents for a right hip aspiration, had hip replacement surgery with Dr Curry last fall and there is concern for possible hip infection  Will perform aspiration and send for labs that Dr uCrry already ordered to screen for infection.      Large Joint Injection/Arthocentesis: R hip joint    Date/Time: 5/6/2020 10:37 AM  Performed by: Emory Terry MD  Authorized by: Emory Terry MD     Indications:  Diagnostic evaluation  Needle Size:  20 G  Guidance: ultrasound    Approach:  Anterior  Location:  Hip      Site:  R hip joint  Aspirate amount (mL):  2.5  Aspirate:  Blood-tinged  Aspirate analysis: sent for lab analysis    Outcome:  Tolerated well, no immediate complications  Timeout: timeout called immediately prior to procedure    Prep: patient was prepped and draped in usual sterile fashion        Diagnosis: right hip effusion s/p total hip  replacement    Intraarticular Hip aspiration- Ultrasound Guided  The patient was informed of the risks and the benefits of the procedure and a written consent was signed.  The patient s right hip was prepped with chlorhexidine in sterile fashion.   aspiration was performed using sterile technique.  Under ultrasound guidance a 3.5-inch 20-gauge needle was used to enter the femoracetabular joint effusion.  Anterior approach was used, needle placement was visualized and documented with ultrasound.  Ultrasound visualization was necessary due to decreased joint space in the setting of replaced hip and identifying effusion. aspiration performed long axis to the probe.  Effusion was identified, and 2.5 ml of blood tinged aspirate was obtained.  Images were permanently stored for the patient's record.  There were no complications. The patient tolerated the procedure well. There was negligible bleeding.   The patient was instructed to ice the hip upon leaving clinic and refrain from overuse over the next 3 days.   The patient was instructed to call or go to the emergency room with any unusual pain, swelling, redness, or if otherwise concerned.

## 2020-05-10 LAB
BACTERIA SPEC CULT: ABNORMAL
BACTERIA SPEC CULT: ABNORMAL
Lab: ABNORMAL
SPECIMEN SOURCE: ABNORMAL

## 2020-05-11 ENCOUNTER — TELEPHONE (OUTPATIENT)
Dept: ORTHOPEDICS | Facility: CLINIC | Age: 65
End: 2020-05-11

## 2020-05-11 NOTE — TELEPHONE ENCOUNTER
Do you have any of the following symptoms:  a)      Fever (or reported chills) No  b)      Shortness of Breath No  c)      Rash No   D)      Cough - Yes - Chronic cough    If a patient reports yes to any of these symptoms, obtain direction from the provider and call the patient back to let them know if they can come in or not.  1.    Provider needs to determine if this patient should still be seen in clinic.  2.    If decision to not see in clinic, call patient back and refer them to COVID- 19 [email or web address] or schedule COVID-19 phone visit.  3.    Turn in-person visit into telephone visit (FOR RETURN PATIENTS ONLY)    Remind patients that visitors are not allowed on site. Only one legal guardian who screens negative to the above questions will be allowed to accompany patients. If a patient indicates that they will be bringing a legal guardian with them to the appointment please make sure to screen both the patient and the legal guardian for symptoms using the tool above.

## 2020-05-12 ENCOUNTER — OFFICE VISIT (OUTPATIENT)
Dept: ORTHOPEDICS | Facility: CLINIC | Age: 65
End: 2020-05-12
Payer: MEDICARE

## 2020-05-12 VITALS
DIASTOLIC BLOOD PRESSURE: 82 MMHG | OXYGEN SATURATION: 97 % | SYSTOLIC BLOOD PRESSURE: 138 MMHG | TEMPERATURE: 98.1 F | HEART RATE: 80 BPM

## 2020-05-12 DIAGNOSIS — Z96.649 PAIN DUE TO TOTAL HIP REPLACEMENT, SEQUELA: Primary | ICD-10-CM

## 2020-05-12 DIAGNOSIS — T84.84XS PAIN DUE TO TOTAL HIP REPLACEMENT, SEQUELA: Primary | ICD-10-CM

## 2020-05-12 LAB
APPEARANCE FLD: NORMAL
COLOR FLD: NORMAL
SPECIMEN SOURCE FLD: NORMAL
WBC # FLD AUTO: 15 /UL

## 2020-05-12 PROCEDURE — 99213 OFFICE O/P EST LOW 20 MIN: CPT | Performed by: ORTHOPAEDIC SURGERY

## 2020-05-12 ASSESSMENT — PAIN SCALES - GENERAL: PAINLEVEL: MILD PAIN (3)

## 2020-05-12 NOTE — LETTER
5/12/2020         RE: Florian Lawrence  [address] [facility] [address]        Dear Colleague,    Thank you for referring your patient, Florian Lawrence, to the [facility]. Please see a copy of my visit note below.    SUBJECTIVE:   Florian Lawrence is here in follow up of his right hip, possible septic total hip arthroplasty.  His pain got a lot better last week.  No more fever/chills.    He did have his right hip aspirated with Dr. Terry on 5/6/20.  A very limited amount of fluid was obtained, and swab cultures of the fluid were sent    Labs: his APR's were elevated, thus aspiration ordered.  Synvoasure:  Still pending?  Cell count: not enough fluid apparently for a full cbc:     5/6/2020 10:30   Body Fluid Analysis Source Right Hip   Color Fluid Slightly Bloody   Appearance Fluid Cloudy   WBC Fluid 15     Bacteria cultured, mixed, consistent with skin lea.  Possible contaminant  Fungal not done  Culture Micro  Abnormal     Light growth   Enterococcus faecalis   not isolated or reported on routine culture   P       Culture Micro  Culture in progress P     Culture Micro  Critical Value/Significant Value, preliminary result only, called to and read back by   Dr. Terry on 5.8.20 at 1320. bw  P       Resulting Agency  [facility]    Susceptibility      Enterococcus faecalis      ELVIA (Preliminary)      AMPICILLIN  <=2.0 ug/mL  Sensitive      VANCOMYCIN  1.0 ug/mL  Sensitive            Culture Micro  Abnormal     Light growth   Staphylococcus hominis     Culture Micro  Critical Value/Significant Value, preliminary result only, called to and read back by   Dr. Terry on 5.8.20 at 1320. bw    Resulting Agency  [facility]    Susceptibility      Staphylococcus hominis      ELVIA      CIPROFLOXACIN  <=0.5 ug/mL  Sensitive      CLINDAMYCIN  <=0.25 ug/mL  Sensitive      ERYTHROMYCIN  <=0.25 ug/mL  Sensitive      GENTAMICIN  <=0.5  ug/mL  Sensitive      LEVOFLOXACIN  <=0.12 ug/mL  Sensitive      OXACILLIN   Resistant      TETRACYCLINE  <=1 ug/mL  Sensitive      VANCOMYCIN  1 ug/mL  Sensitive              Present symptoms: still not back to where he was before the pain started, and has stiffness getting up from a chair or bed, but otherwise has no pain walking.     Review of Systems:  Constitutional/General:  Negative for fever, chills, change in weight  Resp:  He reports a CHRONIC COUGH, and coughing spells over the last couple of months.  Integumentary/Skin: Negative for worrisome rashes, moles, or lesions  Neuro: Negative for weakness, dizziness, or paresthesias   Psychiatric: negative for changes in mood or affect    OBJECTIVE:  Physical Exam:  /82 (BP Location: Left arm, Patient Position: Sitting, Cuff Size: Adult Regular)   Pulse 80   Temp 98.1  F (36.7  C)   SpO2 97%   General Appearance: healthy, alert and no distress   Skin: area of incision has no erythema or warmth.  Neuro: Normal strength and tone, mentation intact and speech normal  Vascular: good pulses, and capillary refill   Lymph: no lymphadenopathy   Psych:  mentation appears normal and affect normal/bright  Resp: no increased work of breathing    Right Hip Exam:  Tender: non-tender   Hip range of motion:    Flexion: non painful   Internal Rotation: 20 degrees with some pain   External Rotation: 45 degrees   Abduction: 30 degrees  Strength: full strength  Leg lengths: not tested  Special tests:  Trendelenburg negative.    ASSESSMENT:   Encounter Diagnosis   Name Primary?     Pain due to total hip replacement, sequela Yes      I think that the bacteria in the aspiration are skin contaminants.  We could attribute his pain 3 weeks ago to a pulled muscle, and the febrile episodes with elevated APR's to an upper respiratory issue, and he continues to have a cough.  With the coincidence of these symptoms, that theory seems a bit far-fetched.  But the lack of fluid and the  resolution of symptoms support a theory other than a septic total hip arthroplasty.    PLAN:   We will await the Synovasure results.  If +, then 2 stage vs 1 stage revision of the hip would be planned.  If synovasure is negative, then monitor his symptoms closely.  If no systemic issues and no pain, follow up at 1 year postoperative with xrays.  If his hip is painful, and not improved a lot in 6 weeks. He should return, and repeat aspiration, with IR (instead of ultrasound guided) would be done.  If the hip starts worsening again, then I would consider the IR aspiration be repeated.    Whenever he returns. New xrays will be done.    I suggested he consult with Dr. Pro about his chronic cough.      MARITA Curry MD  Dept. Orthopedic Surgery  [facility]         Again, thank you for allowing me to participate in the care of your patient.        Sincerely,        Atul Curry MD

## 2020-05-13 LAB — SYNOVASURE PERIPROSTHETIC JOINT INFECTION DECTION: NORMAL

## 2020-05-14 ENCOUNTER — MYC MEDICAL ADVICE (OUTPATIENT)
Dept: INTERNAL MEDICINE | Facility: CLINIC | Age: 65
End: 2020-05-14

## 2020-05-14 DIAGNOSIS — J98.8 WHEEZING-ASSOCIATED RESPIRATORY INFECTION (WARI): Primary | ICD-10-CM

## 2020-05-15 RX ORDER — ALBUTEROL SULFATE 90 UG/1
2 AEROSOL, METERED RESPIRATORY (INHALATION) EVERY 6 HOURS
Qty: 1 INHALER | Refills: 1 | Status: SHIPPED | OUTPATIENT
Start: 2020-05-15 | End: 2021-12-02

## 2020-05-20 LAB
BACTERIA SPEC CULT: ABNORMAL
Lab: ABNORMAL
SPECIMEN SOURCE: ABNORMAL

## 2020-05-21 DIAGNOSIS — E11.42 CONTROLLED TYPE 2 DIABETES MELLITUS WITH DIABETIC POLYNEUROPATHY, WITHOUT LONG-TERM CURRENT USE OF INSULIN (H): ICD-10-CM

## 2020-05-21 RX ORDER — GLIMEPIRIDE 4 MG/1
TABLET ORAL
Qty: 90 TABLET | Refills: 0 | Status: SHIPPED | OUTPATIENT
Start: 2020-05-21 | End: 2020-07-03

## 2020-05-21 NOTE — TELEPHONE ENCOUNTER
Medication is being filled for 1 time refill only due to:  Patient needs to be seen because need recheck in July.   Matilda Zayas RN

## 2020-05-22 ENCOUNTER — MYC MEDICAL ADVICE (OUTPATIENT)
Dept: ORTHOPEDICS | Facility: CLINIC | Age: 65
End: 2020-05-22

## 2020-06-25 ENCOUNTER — MYC MEDICAL ADVICE (OUTPATIENT)
Dept: INTERNAL MEDICINE | Facility: CLINIC | Age: 65
End: 2020-06-25

## 2020-06-25 DIAGNOSIS — E11.42 CONTROLLED TYPE 2 DIABETES MELLITUS WITH DIABETIC POLYNEUROPATHY, WITHOUT LONG-TERM CURRENT USE OF INSULIN (H): Primary | ICD-10-CM

## 2020-06-25 NOTE — TELEPHONE ENCOUNTER
Last OV was 10/14/2019 but for a pre-op  Overdue for annual and diabetic check  Please place any previsit labs needed     Physicals are now allowed as face to face visits  Medicare wellness exams can still be offered as video visits     Odette Madden RN

## 2020-06-26 NOTE — TELEPHONE ENCOUNTER
Lets recommend that patient schedule a complete physical exam as a face to face office visit     Pre-clinic laboratory studies ordered     Cecilio Pro MD

## 2020-06-30 ENCOUNTER — DOCUMENTATION ONLY (OUTPATIENT)
Dept: FAMILY MEDICINE | Facility: CLINIC | Age: 65
End: 2020-06-30

## 2020-06-30 DIAGNOSIS — E78.5 HYPERLIPIDEMIA LDL GOAL <100: Primary | ICD-10-CM

## 2020-06-30 DIAGNOSIS — E78.5 HYPERLIPIDEMIA LDL GOAL <100: ICD-10-CM

## 2020-06-30 DIAGNOSIS — E11.42 CONTROLLED TYPE 2 DIABETES MELLITUS WITH DIABETIC POLYNEUROPATHY, WITHOUT LONG-TERM CURRENT USE OF INSULIN (H): ICD-10-CM

## 2020-06-30 LAB
CREAT UR-MCNC: 94 MG/DL
GLUCOSE SERPL-MCNC: 139 MG/DL (ref 70–99)
HBA1C MFR BLD: 6.7 % (ref 0–5.6)
MICROALBUMIN UR-MCNC: 39 MG/L
MICROALBUMIN/CREAT UR: 41.21 MG/G CR (ref 0–17)

## 2020-06-30 PROCEDURE — 80061 LIPID PANEL: CPT | Performed by: INTERNAL MEDICINE

## 2020-06-30 PROCEDURE — 83036 HEMOGLOBIN GLYCOSYLATED A1C: CPT | Performed by: INTERNAL MEDICINE

## 2020-06-30 PROCEDURE — 82947 ASSAY GLUCOSE BLOOD QUANT: CPT | Performed by: INTERNAL MEDICINE

## 2020-06-30 PROCEDURE — 36415 COLL VENOUS BLD VENIPUNCTURE: CPT | Performed by: INTERNAL MEDICINE

## 2020-06-30 PROCEDURE — 82043 UR ALBUMIN QUANTITATIVE: CPT | Performed by: INTERNAL MEDICINE

## 2020-06-30 NOTE — PROGRESS NOTES
SUBJECTIVE:   Florian Lawrence is here in follow up of his right hip, possible septic total hip arthroplasty. DOS 10/28/19  His pain got a lot better in early may, with no more fever/chills.    Synovasure was not done due to insufficient fluid.    I think that the bacteria in the aspiration are skin contaminants.  We could attribute his pain 3 weeks ago to a pulled muscle, and the febrile episodes with elevated APR's to an upper respiratory issue, and he continues to have a cough.  With the coincidence of these symptoms, that theory seems a bit far-fetched.  But the lack of fluid and the resolution of symptoms support a theory other than a septic total hip arthroplasty.    Present symptoms: He was to follow up at one year postoperative if no problems, but he is back a lot sooner. He is still not back to where he was before the pain started, and still  has stiffness getting up from a chair or bed, but otherwise has no pain walking.  Better than last visit in May.  Occasional sharp pains in groin.  Pain is much better than before surgery.  Able to walk about a mile.  Has been golfing.    He mentions feeling cold at 78 degrees, when he used to feel hot above 78.    He did have his right hip aspirated with Dr. Terry on 5/6/20.  A very limited amount of fluid was obtained, and swab cultures of the fluid were sent    Labs: his APR's were elevated, thus aspiration ordered.  Synvoasure:  Still pending?  Cell count: not enough fluid apparently for a full cbc:     5/6/2020 10:30   Body Fluid Analysis Source Right Hip   Color Fluid Slightly Bloody   Appearance Fluid Cloudy   WBC Fluid 15     Bacteria cultured, mixed, consistent with skin lea.  Possible contaminant  Fungal not done  Culture Micro  Abnormal     Light growth   Enterococcus faecalis   not isolated or reported on routine culture   P       Culture Micro  Culture in progress P     Culture Micro  Critical Value/Significant Value, preliminary result only, called to  and read back by   Dr. Terry on 5.8.20 at 1320. bw  P       Resulting Agency  [facility]    Susceptibility      Enterococcus faecalis      ELVIA (Preliminary)      AMPICILLIN  <=2.0 ug/mL  Sensitive      VANCOMYCIN  1.0 ug/mL  Sensitive            Culture Micro  Abnormal     Light growth   Staphylococcus hominis     Culture Micro  Critical Value/Significant Value, preliminary result only, called to and read back by   Dr. Terry on 5.8.20 at 1320. bw    Resulting Agency  [facility]    Susceptibility      Staphylococcus hominis      ELVIA      CIPROFLOXACIN  <=0.5 ug/mL  Sensitive      CLINDAMYCIN  <=0.25 ug/mL  Sensitive      ERYTHROMYCIN  <=0.25 ug/mL  Sensitive      GENTAMICIN  <=0.5 ug/mL  Sensitive      LEVOFLOXACIN  <=0.12 ug/mL  Sensitive      OXACILLIN   Resistant      TETRACYCLINE  <=1 ug/mL  Sensitive      VANCOMYCIN  1 ug/mL  Sensitive            Review of Systems:  Constitutional/General:  Negative for fever, chills, change in weight  Resp:   He no longer reports a CHRONIC COUGH, and coughing spells.  Integumentary/Skin: Negative for worrisome rashes, moles, or lesions  Neuro: Negative for weakness, dizziness, or paresthesias   Psychiatric: negative for changes in mood or affect    OBJECTIVE:  Physical Exam:  /71 (BP Location: Left arm, Patient Position: Sitting, Cuff Size: Adult Regular)   Pulse 83   SpO2 95%   General Appearance: healthy, alert and no distress   Skin: area of incision has no erythema or warmth.  Neuro: Normal strength and tone, mentation intact and speech normal  Vascular: good pulses, and capillary refill   Lymph: no lymphadenopathy   Psych:  mentation appears normal and affect normal/bright  Resp: no increased work of breathing    Right Hip Exam:  Tender: non-tender   Hip range of motion:    Flexion: non painful   Internal Rotation: 20 degrees with some pain   External Rotation: 45 degrees   Abduction: 30  degrees  Strength: full strength  Leg lengths: not tested  Special tests:  Trendelenburg negative.    ASSESSMENT:   Encounter Diagnosis   Name Primary?     Pain due to total hip replacement, sequela Yes   doing better.      PLAN:   If no systemic issues and no pain, follow up at 1 year postoperative with xrays.  If his hip gets worse, he should return, new blood draw,  and repeat aspiration, with IR (instead of ultrasound guided) would be done.      MARITA Curry MD  Dept. Orthopedic Surgery  Health system

## 2020-06-30 NOTE — LETTER
July 1, 2020      Florian Lawrence  [address] [facility] [address]    Dear ,    We are writing to inform you of your test results.    So you were absolutely right Mr. Lawrence. You have increased your physical activity and / or improved your diet and as a direct result of this your diabetes mellitus is now well controlled !     Please keep doing what you are doing       Lets see you for a face to face encounter in 3 months. Otherwise Please let me know if you have any questions for me or need any additional refills or anything else.     Resulted Orders   **Glucose FUTURE anytime   Result Value Ref Range    Glucose 139 (H) 70 - 99 mg/dL      Comment:      Fasting specimen   **A1C FUTURE anytime   Result Value Ref Range    Hemoglobin A1C 6.7 (H) 0 - 5.6 %      Comment:      Normal <5.7% Prediabetes 5.7-6.4%  Diabetes 6.5% or higher - adopted from ADA   consensus guidelines.     Albumin Random Urine Quantitative with Creat Ratio   Result Value Ref Range    Creatinine Urine 94 mg/dL    Albumin Urine mg/L 39 mg/L    Albumin Urine mg/g Cr 41.21 (H) 0 - 17 mg/g Cr       If you have any questions or concerns, please call the clinic at the number listed above.       Sincerely,    Cecilio Pro MD/sherron

## 2020-06-30 NOTE — LETTER
July 7, 2020      Florian Lawrence  [address] [facility] [id number]          Dear ,    We are writing to inform you of your test results.  Pre-visit laboratory studies to be discussed at appointment.       Resulted Orders   **Glucose FUTURE anytime   Result Value Ref Range    Glucose 139 (H) 70 - 99 mg/dL      Comment:      Fasting specimen   **A1C FUTURE anytime   Result Value Ref Range    Hemoglobin A1C 6.7 (H) 0 - 5.6 %      Comment:      Normal <5.7% Prediabetes 5.7-6.4%  Diabetes 6.5% or higher - adopted from ADA   consensus guidelines.     Albumin Random Urine Quantitative with Creat Ratio   Result Value Ref Range    Creatinine Urine 94 mg/dL    Albumin Urine mg/L 39 mg/L    Albumin Urine mg/g Cr 41.21 (H) 0 - 17 mg/g Cr   Lipid panel reflex to direct LDL Fasting   Result Value Ref Range    Cholesterol 130 <200 mg/dL    Triglycerides 192 (H) <150 mg/dL      Comment:      Borderline high:  150-199 mg/dl  High:             200-499 mg/dl  Very high:       >499 mg/dl  Fasting specimen      HDL Cholesterol 32 (L) >39 mg/dL    LDL Cholesterol Calculated 60 <100 mg/dL      Comment:      Desirable:       <100 mg/dl    Non HDL Cholesterol 98 <130 mg/dL       If you have any questions or concerns, please call the clinic at the number listed above.       Sincerely,        Cecilio Pro MD

## 2020-07-01 LAB
CHOLEST SERPL-MCNC: 130 MG/DL
HDLC SERPL-MCNC: 32 MG/DL
LDLC SERPL CALC-MCNC: 60 MG/DL
NONHDLC SERPL-MCNC: 98 MG/DL
TRIGL SERPL-MCNC: 192 MG/DL
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
## 2020-07-03 NOTE — PATIENT INSTRUCTIONS
Patient Education   Personalized Prevention Plan  You are due for the preventive services outlined below.  Your care team is available to assist you in scheduling these services.  If you have already completed any of these items, please share that information with your care team to update in your medical record.  Health Maintenance Due   Topic Date Due     Zoster (Shingles) Vaccine (1 of 2) 03/27/2005     Diabetic Foot Exam  09/25/2019     Annual Wellness Visit  03/27/2020     FALL RISK ASSESSMENT  03/27/2020     AORTIC ANEURYSM SCREENING (SYSTEM ASSIGNED)  03/27/2020     Pneumococcal Vaccine (2 of 2 - PPSV23) 03/27/2020

## 2020-07-06 ENCOUNTER — OFFICE VISIT (OUTPATIENT)
Dept: INTERNAL MEDICINE | Facility: CLINIC | Age: 65
End: 2020-07-06
Payer: MEDICARE

## 2020-07-06 VITALS
HEART RATE: 88 BPM | RESPIRATION RATE: 15 BRPM | TEMPERATURE: 97.3 F | OXYGEN SATURATION: 96 % | DIASTOLIC BLOOD PRESSURE: 68 MMHG | BODY MASS INDEX: 34.81 KG/M2 | WEIGHT: 249.6 LBS | SYSTOLIC BLOOD PRESSURE: 132 MMHG

## 2020-07-06 DIAGNOSIS — R68.83 CHILLS (WITHOUT FEVER): ICD-10-CM

## 2020-07-06 DIAGNOSIS — E78.5 HYPERLIPIDEMIA LDL GOAL <100: ICD-10-CM

## 2020-07-06 DIAGNOSIS — Z23 NEED FOR SHINGLES VACCINE: ICD-10-CM

## 2020-07-06 DIAGNOSIS — B02.30 HERPES ZOSTER OPHTHALMICUS: ICD-10-CM

## 2020-07-06 DIAGNOSIS — Z00.00 ENCOUNTER FOR MEDICARE ANNUAL WELLNESS EXAM: Primary | ICD-10-CM

## 2020-07-06 DIAGNOSIS — Z23 NEED FOR 23-POLYVALENT PNEUMOCOCCAL POLYSACCHARIDE VACCINE: ICD-10-CM

## 2020-07-06 DIAGNOSIS — Z13.6 SCREENING FOR AAA (ABDOMINAL AORTIC ANEURYSM): ICD-10-CM

## 2020-07-06 DIAGNOSIS — E11.42 CONTROLLED TYPE 2 DIABETES MELLITUS WITH DIABETIC POLYNEUROPATHY, WITHOUT LONG-TERM CURRENT USE OF INSULIN (H): ICD-10-CM

## 2020-07-06 PROCEDURE — 90732 PPSV23 VACC 2 YRS+ SUBQ/IM: CPT | Performed by: INTERNAL MEDICINE

## 2020-07-06 PROCEDURE — 99207 C FOOT EXAM  NO CHARGE: CPT | Mod: 25 | Performed by: INTERNAL MEDICINE

## 2020-07-06 PROCEDURE — G0402 INITIAL PREVENTIVE EXAM: HCPCS | Performed by: INTERNAL MEDICINE

## 2020-07-06 PROCEDURE — 99213 OFFICE O/P EST LOW 20 MIN: CPT | Mod: 25 | Performed by: INTERNAL MEDICINE

## 2020-07-06 PROCEDURE — G0009 ADMIN PNEUMOCOCCAL VACCINE: HCPCS | Performed by: INTERNAL MEDICINE

## 2020-07-06 RX ORDER — SIMVASTATIN 20 MG
20 TABLET ORAL DAILY
Qty: 90 TABLET | Refills: 1 | Status: SHIPPED | OUTPATIENT
Start: 2020-07-06 | End: 2021-01-11

## 2020-07-06 RX ORDER — GLIMEPIRIDE 4 MG/1
TABLET ORAL
Qty: 90 TABLET | Refills: 1 | Status: SHIPPED | OUTPATIENT
Start: 2020-07-06 | End: 2021-02-19

## 2020-07-06 RX ORDER — INSULIN GLARGINE 100 [IU]/ML
60 INJECTION, SOLUTION SUBCUTANEOUS AT BEDTIME
Qty: 60 ML | Refills: 4 | Status: SHIPPED | OUTPATIENT
Start: 2020-07-06 | End: 2021-07-28

## 2020-07-06 NOTE — PROGRESS NOTES
Prior to immunization administration, verified patients identity using patient s name and date of birth. Please see Immunization Activity for additional information.     Screening Questionnaire for Adult Immunization    Are you sick today?   No   Do you have allergies to medications, food, a vaccine component or latex?   No   Have you ever had a serious reaction after receiving a vaccination?   No   Do you have a long-term health problem with heart, lung, kidney, or metabolic disease (e.g., diabetes), asthma, a blood disorder, no spleen, complement component deficiency, a cochlear implant, or a spinal fluid leak?  Are you on long-term aspirin therapy?   No   Do you have cancer, leukemia, HIV/AIDS, or any other immune system problem?   No   Do you have a parent, brother, or sister with an immune system problem?   No   In the past 3 months, have you taken medications that affect  your immune system, such as prednisone, other steroids, or anticancer drugs; drugs for the treatment of rheumatoid arthritis, Crohn s disease, or psoriasis; or have you had radiation treatments?   No   Have you had a seizure, or a brain or other nervous system problem?   No   During the past year, have you received a transfusion of blood or blood    products, or been given immune (gamma) globulin or antiviral drug?   No   For women: Are you pregnant or is there a chance you could become       pregnant during the next month?   No   Have you received any vaccinations in the past 4 weeks?   No     Immunization questionnaire answers were all negative.        Per orders of Dr. Pro, injection of PPSV 23 given by Katy Cruz. Patient instructed to remain in clinic for 15 minutes afterwards, and to report any adverse reaction to me immediately.       Screening performed by Katy Cruz on 7/6/2020 at 2:43 PM.

## 2020-07-08 ENCOUNTER — OFFICE VISIT (OUTPATIENT)
Dept: ORTHOPEDICS | Facility: CLINIC | Age: 65
End: 2020-07-08
Payer: MEDICARE

## 2020-07-08 VITALS — OXYGEN SATURATION: 95 % | SYSTOLIC BLOOD PRESSURE: 137 MMHG | HEART RATE: 83 BPM | DIASTOLIC BLOOD PRESSURE: 71 MMHG

## 2020-07-08 DIAGNOSIS — Z96.649 PAIN DUE TO TOTAL HIP REPLACEMENT, SEQUELA: Primary | ICD-10-CM

## 2020-07-08 DIAGNOSIS — T84.84XS PAIN DUE TO TOTAL HIP REPLACEMENT, SEQUELA: Primary | ICD-10-CM

## 2020-07-08 PROCEDURE — 99213 OFFICE O/P EST LOW 20 MIN: CPT | Performed by: ORTHOPAEDIC SURGERY

## 2020-07-08 NOTE — LETTER
7/8/2020         RE: Florian Lawrence  [address] [id number]        Dear Colleague,    Thank you for referring your patient, Florian Lawrence, to the [facility]. Please see a copy of my visit note below.    SUBJECTIVE:   Florian Lawrence is here in follow up of his right hip, possible septic total hip arthroplasty. DOS 10/28/19  His pain got a lot better in early may, with no more fever/chills.    Synovasure was not done due to insufficient fluid.    I think that the bacteria in the aspiration are skin contaminants.  We could attribute his pain 3 weeks ago to a pulled muscle, and the febrile episodes with elevated APR's to an upper respiratory issue, and he continues to have a cough.  With the coincidence of these symptoms, that theory seems a bit far-fetched.  But the lack of fluid and the resolution of symptoms support a theory other than a septic total hip arthroplasty.    Present symptoms: He was to follow up at one year postoperative if no problems, but he is back a lot sooner. He is still not back to where he was before the pain started, and still  has stiffness getting up from a chair or bed, but otherwise has no pain walking.  Better than last visit in May.  Occasional sharp pains in groin.  Pain is much better than before surgery.  Able to walk about a mile.  Has been golfing.    He mentions feeling cold at 78 degrees, when he used to feel hot above 78.    He did have his right hip aspirated with Dr. Terry on 5/6/20.  A very limited amount of fluid was obtained, and swab cultures of the fluid were sent    Labs: his APR's were elevated, thus aspiration ordered.  Synvoasure:  Still pending?  Cell count: not enough fluid apparently for a full cbc:     5/6/2020 10:30   Body Fluid Analysis Source Right Hip   Color Fluid Slightly Bloody   Appearance Fluid Cloudy   WBC Fluid 15     Bacteria cultured, mixed, consistent with skin lea.  Possible contaminant  Fungal not  done  Culture Micro  Abnormal     Light growth   Enterococcus faecalis   not isolated or reported on routine culture   P       Culture Micro  Culture in progress P     Culture Micro  Critical Value/Significant Value, preliminary result only, called to and read back by   Dr. Terry on 5.8.20 at 1320. bw  P       Resulting Agency  [facility]    Susceptibility      Enterococcus faecalis      ELVIA (Preliminary)      AMPICILLIN  <=2.0 ug/mL  Sensitive      VANCOMYCIN  1.0 ug/mL  Sensitive            Culture Micro  Abnormal     Light growth   Staphylococcus hominis     Culture Micro  Critical Value/Significant Value, preliminary result only, called to and read back by   Dr. Terry on 5.8.20 at 1320. bw    Resulting Agency  [facility]    Susceptibility      Staphylococcus hominis      ELVIA      CIPROFLOXACIN  <=0.5 ug/mL  Sensitive      CLINDAMYCIN  <=0.25 ug/mL  Sensitive      ERYTHROMYCIN  <=0.25 ug/mL  Sensitive      GENTAMICIN  <=0.5 ug/mL  Sensitive      LEVOFLOXACIN  <=0.12 ug/mL  Sensitive      OXACILLIN   Resistant      TETRACYCLINE  <=1 ug/mL  Sensitive      VANCOMYCIN  1 ug/mL  Sensitive            Review of Systems:  Constitutional/General:  Negative for fever, chills, change in weight  Resp:   He no longer reports a CHRONIC COUGH, and coughing spells.  Integumentary/Skin: Negative for worrisome rashes, moles, or lesions  Neuro: Negative for weakness, dizziness, or paresthesias   Psychiatric: negative for changes in mood or affect    OBJECTIVE:  Physical Exam:  /71 (BP Location: Left arm, Patient Position: Sitting, Cuff Size: Adult Regular)   Pulse 83   SpO2 95%   General Appearance: healthy, alert and no distress   Skin: area of incision has no erythema or warmth.  Neuro: Normal strength and tone, mentation intact and speech normal  Vascular: good pulses, and capillary refill   Lymph: no lymphadenopathy   Psych:  mentation appears normal and  affect normal/bright  Resp: no increased work of breathing    Right Hip Exam:  Tender: non-tender   Hip range of motion:    Flexion: non painful   Internal Rotation: 20 degrees with some pain   External Rotation: 45 degrees   Abduction: 30 degrees  Strength: full strength  Leg lengths: not tested  Special tests:  Trendelenburg negative.    ASSESSMENT:   Encounter Diagnosis   Name Primary?     Pain due to total hip replacement, sequela Yes   doing better.      PLAN:   If no systemic issues and no pain, follow up at 1 year postoperative with xrays.  If his hip gets worse, he should return, new blood draw,  and repeat aspiration, with IR (instead of ultrasound guided) would be done.      MARITA Curry MD  Dept. Orthopedic Surgery  [facility]         Again, thank you for allowing me to participate in the care of your patient.        Sincerely,        Atul Curry MD

## 2020-07-14 ENCOUNTER — ANCILLARY PROCEDURE (OUTPATIENT)
Dept: ULTRASOUND IMAGING | Facility: CLINIC | Age: 65
End: 2020-07-14
Attending: INTERNAL MEDICINE
Payer: MEDICARE

## 2020-07-14 DIAGNOSIS — Z13.6 SCREENING FOR AAA (ABDOMINAL AORTIC ANEURYSM): ICD-10-CM

## 2020-07-14 PROCEDURE — 76706 US ABDL AORTA SCREEN AAA: CPT

## 2020-07-25 DIAGNOSIS — E11.42 CONTROLLED TYPE 2 DIABETES MELLITUS WITH DIABETIC POLYNEUROPATHY, WITHOUT LONG-TERM CURRENT USE OF INSULIN (H): ICD-10-CM

## 2020-07-26 RX ORDER — INSULIN GLARGINE 100 [IU]/ML
INJECTION, SOLUTION SUBCUTANEOUS
Qty: 60 ML | Refills: 4 | OUTPATIENT
Start: 2020-07-26

## 2020-08-12 NOTE — PROGRESS NOTES
"Florian Lawrence is being evaluated via a billable telephone visit.     The patient has been notified of following:     \"This telephone visit will be conducted via a call between you and your physician/provider. We have found that certain health care needs can be provided without the need for a physical exam.  This service lets us provide the care you need with a short phone conversation.  If a prescription is necessary we can send it directly to your pharmacy.  If lab work is needed we can place an order for that and you can then stop by our lab to have the test done at a later time.    If during the course of the call the physician/provider feels a telephone visit is not appropriate, you will not be charged for this service.\"     Patient has given verbal consent for Telephone visit?  Yes    CC:  No chief complaint on file.    SUBJECTIVE:   Florian Lawrence for follow up of his right hip,  total hip arthroplasty. DOS 10/28/19  He went to the wrong clinic, so labs and xrays were done there, and we are reviewing them by phone     8/12/2020:  Severe pain awoke Monday morning (8/10/20) with lateral pain by the beltline.  Limping. Pain just above the incision, lateral.  Different than the previous groin pains, which had disappeared.  This is constant pain.  No fever/chills.  Not feeling sick. No systemic symptoms.    Pain to roll onto that side in bed. Some pain with weight bearing.  Not sure if there is abduction weakness.  No puffiness or swelling.      TODAY: no change in symptoms     11/5/19 note: while he was trying to get out of a chair. He felt a sudden snap in his right hip. He can't pinpoint where he felt the snapping. He noticed a \"line\" down his leg.     He gets occasional sharp pain in the groin area but nothing like the previous episode he had  had persistent drainage postoperatively from the drain site in the hospital, and was put on Keflex for 7 days      5/12/20 note: He did have his right hip aspirated " "with Dr. Terry on 5/6/20.  A very limited amount of fluid was obtained, and swab cultures of the fluid were sent     Labs: his APR's were elevated, thus aspiration ordered     His pain got a lot better in early may, with no more fever/chills.     Synovasure was not done due to insufficient fluid.     I think that the bacteria in the aspiration are skin contaminants.  We could attribute his pain 3 weeks ago to a pulled muscle, and the febrile episodes with elevated APR's to an upper respiratory issue, and he continues to have a cough.  With the coincidence of these symptoms, that theory seems a bit far-fetched.  But the lack of fluid and the resolution of symptoms support a theory other than a septic total hip arthroplasty.     7/8/20 note:   He is still not back to where he was before the pain started, and still  has stiffness getting up from a chair or bed, but otherwise has no pain walking.  Better than last visit in May.  Occasional sharp pains in groin.  Pain is much better than before surgery.  Able to walk about a mile.     Review of Systems:  Constitutional/General: Negative for fever, chills, change in weight  Integumentary/Skin: Negative for worrisome rashes, moles, or lesions  Neuro: Negative for weakness, dizziness, or paresthesias   Psychiatric: negative for changes in mood or affect    OBJECTIVE:  He took his temp today 98.3  Says there is some \"puffiness\" laterally.    Xray 2/14/19, lumbar spine: mild degenerative changes     Xrays today show the components in good position, with some mild heterotopic ossification, including laterally where the screws were removed.  No cystic changes and good bone incorporation, with no loosening.    Labs:  CRP inflammation   Order: [id number]   Status: Final result Visible to patient: No (not released) Dx: Pain due to total hip replacement, se...      Ref Range & Units  11:23 AM   3mo ago     CRP Inflammation  0.0 - 8.0 mg/L  159.0 73.9   [facility]ing Agency   [facility]  "       Specimen Collected: 08/13/20 11:23 AM  Last Resulted: 08/13/20 11:55 AM    Lab Flowsheet   Order Details   View Encounter   Lab and Collection Details   Routing   Result History       Erythrocyte sedimentation rate auto   Order: [id number]   Status: Final result Visible to patient: No (not released) Dx: Pain due to total hip replacement, se...      Ref Range & Units  11:23 AM   3mo ago     Sed Rate  0 - 20 mm/h  60 68   Resulting Agency   [facility]        Specimen Collected: 08/13/20 11:23 AM  Last Resulted: 08/13/20 11:49 AM    Lab Flowsheet   Order Details   View Encounter   Lab and Collection Details   Routing   Result History       CBC with platelets differential   Order: [id number]   Status: Final result Visible to patient: No (not released) Dx: Pain due to total hip replacement, se...      Ref Range & Units  11:23 AM   3mo ago     WBC  4.0 - 11.0 10e9/L  8.7  7.4    RBC Count  4.4 - 5.9 10e12/L  4.48  4.49    Hemoglobin  13.3 - 17.7 g/dL  12.8 13.8    Hematocrit  40.0 - 53.0 %  38.3 40.0    MCV  78 - 100 fl  86  89    MCH  26.5 - 33.0 pg  28.6  30.7    MCHC  31.5 - 36.5 g/dL  33.4  34.5    RDW  10.0 - 15.0 %  13.7  13.0    Platelet Count  150 - 450 10e9/L  254  367    Diff Method   Automated Method  Automated Method    % Neutrophils  %  80.4  79.1    % Lymphocytes  %  9.7  9.9    % Monocytes  %  8.1  9.8    % Eosinophils  %  0.9  0.9    % Basophils  %  0.6  0.3    % Immature Granulocytes  %  0.3     Absolute Neutrophil  1.6 - 8.3 10e9/L  7.0  5.8    Absolute Lymphocytes  0.8 - 5.3 10e9/L  0.9  0.7   Absolute Monocytes  0.0 - 1.3 10e9/L  0.7  0.7    Absolute Eosinophils  0.0 - 0.7 10e9/L  0.1  0.1    Absolute Basophils  0.0 - 0.2 10e9/L  0.1  0.0    Abs Immature Granulocytes  0 - 0.4 10e9/L  0.0         ASSESSMENT:   It is once again a definite possibility that this pain is due to a septic total hip arthroplasty.   He now has no other systemic reason for elevated APR's, like he seemed to have last time.      PLAN:   I have recommended a repeat right hip aspiration, this time by IR.    Gram stain, an and aerobic cultures, fungal cultures, cell count and synovasure requested.  Likely to be done at the .    Return to clinic: to discuss the results    MARITA Curry MD  Dept. Orthopedic Surgery  [facility]

## 2020-08-13 ENCOUNTER — ANCILLARY PROCEDURE (OUTPATIENT)
Dept: GENERAL RADIOLOGY | Facility: CLINIC | Age: 65
End: 2020-08-13
Attending: ORTHOPAEDIC SURGERY
Payer: MEDICARE

## 2020-08-13 ENCOUNTER — VIRTUAL VISIT (OUTPATIENT)
Dept: ORTHOPEDICS | Facility: CLINIC | Age: 65
End: 2020-08-13
Payer: MEDICARE

## 2020-08-13 DIAGNOSIS — R68.83 CHILLS (WITHOUT FEVER): ICD-10-CM

## 2020-08-13 DIAGNOSIS — T84.84XS PAIN DUE TO TOTAL HIP REPLACEMENT, SEQUELA: Primary | ICD-10-CM

## 2020-08-13 DIAGNOSIS — Z96.649 PAIN DUE TO TOTAL HIP REPLACEMENT, SEQUELA: Primary | ICD-10-CM

## 2020-08-13 DIAGNOSIS — T84.84XS PAIN DUE TO TOTAL HIP REPLACEMENT, SEQUELA: ICD-10-CM

## 2020-08-13 DIAGNOSIS — Z96.649 PAIN DUE TO TOTAL HIP REPLACEMENT, SEQUELA: ICD-10-CM

## 2020-08-13 LAB
ALBUMIN SERPL-MCNC: 2.9 G/DL (ref 3.4–5)
ALP SERPL-CCNC: 74 U/L (ref 40–150)
ALT SERPL W P-5'-P-CCNC: 16 U/L (ref 0–70)
ANION GAP SERPL CALCULATED.3IONS-SCNC: 5 MMOL/L (ref 3–14)
AST SERPL W P-5'-P-CCNC: 9 U/L (ref 0–45)
BASOPHILS # BLD AUTO: 0.1 10E9/L (ref 0–0.2)
BASOPHILS NFR BLD AUTO: 0.6 %
BILIRUB SERPL-MCNC: 0.9 MG/DL (ref 0.2–1.3)
BUN SERPL-MCNC: 19 MG/DL (ref 7–30)
CALCIUM SERPL-MCNC: 9.3 MG/DL (ref 8.5–10.1)
CHLORIDE SERPL-SCNC: 107 MMOL/L (ref 94–109)
CO2 SERPL-SCNC: 23 MMOL/L (ref 20–32)
CREAT SERPL-MCNC: 0.8 MG/DL (ref 0.66–1.25)
CRP SERPL-MCNC: 159 MG/L (ref 0–8)
DIFFERENTIAL METHOD BLD: ABNORMAL
EOSINOPHIL # BLD AUTO: 0.1 10E9/L (ref 0–0.7)
EOSINOPHIL NFR BLD AUTO: 0.9 %
ERYTHROCYTE [DISTWIDTH] IN BLOOD BY AUTOMATED COUNT: 13.7 % (ref 10–15)
ERYTHROCYTE [SEDIMENTATION RATE] IN BLOOD BY WESTERGREN METHOD: 60 MM/H (ref 0–20)
GFR SERPL CREATININE-BSD FRML MDRD: >90 ML/MIN/{1.73_M2}
GLUCOSE SERPL-MCNC: 154 MG/DL (ref 70–99)
HCT VFR BLD AUTO: 38.3 % (ref 40–53)
HGB BLD-MCNC: 12.8 G/DL (ref 13.3–17.7)
IMM GRANULOCYTES # BLD: 0 10E9/L (ref 0–0.4)
IMM GRANULOCYTES NFR BLD: 0.3 %
LYMPHOCYTES # BLD AUTO: 0.9 10E9/L (ref 0.8–5.3)
LYMPHOCYTES NFR BLD AUTO: 9.7 %
MCH RBC QN AUTO: 28.6 PG (ref 26.5–33)
MCHC RBC AUTO-ENTMCNC: 33.4 G/DL (ref 31.5–36.5)
MCV RBC AUTO: 86 FL (ref 78–100)
MONOCYTES # BLD AUTO: 0.7 10E9/L (ref 0–1.3)
MONOCYTES NFR BLD AUTO: 8.1 %
NEUTROPHILS # BLD AUTO: 7 10E9/L (ref 1.6–8.3)
NEUTROPHILS NFR BLD AUTO: 80.4 %
PLATELET # BLD AUTO: 254 10E9/L (ref 150–450)
POTASSIUM SERPL-SCNC: 4 MMOL/L (ref 3.4–5.3)
PROT SERPL-MCNC: 7.9 G/DL (ref 6.8–8.8)
RBC # BLD AUTO: 4.48 10E12/L (ref 4.4–5.9)
SODIUM SERPL-SCNC: 135 MMOL/L (ref 133–144)
WBC # BLD AUTO: 8.7 10E9/L (ref 4–11)

## 2020-08-13 PROCEDURE — 87040 BLOOD CULTURE FOR BACTERIA: CPT | Performed by: INTERNAL MEDICINE

## 2020-08-13 PROCEDURE — 85652 RBC SED RATE AUTOMATED: CPT | Performed by: ORTHOPAEDIC SURGERY

## 2020-08-13 PROCEDURE — 36415 COLL VENOUS BLD VENIPUNCTURE: CPT | Performed by: ORTHOPAEDIC SURGERY

## 2020-08-13 PROCEDURE — 85025 COMPLETE CBC W/AUTO DIFF WBC: CPT | Performed by: ORTHOPAEDIC SURGERY

## 2020-08-13 PROCEDURE — 99441 ZZC PHYSICIAN TELEPHONE EVALUATION 5-10 MIN: CPT | Performed by: ORTHOPAEDIC SURGERY

## 2020-08-13 PROCEDURE — 73502 X-RAY EXAM HIP UNI 2-3 VIEWS: CPT | Mod: RT | Performed by: RADIOLOGY

## 2020-08-13 PROCEDURE — 86140 C-REACTIVE PROTEIN: CPT | Performed by: ORTHOPAEDIC SURGERY

## 2020-08-13 PROCEDURE — 80053 COMPREHEN METABOLIC PANEL: CPT | Performed by: INTERNAL MEDICINE

## 2020-08-13 NOTE — PROGRESS NOTES
"Florian Lawrence is a 65 year old male who is being evaluated via a billable telephone visit.      The patient has been notified of following:     \"This telephone visit will be conducted via a call between you and your physician/provider. We have found that certain health care needs can be provided without the need for a physical exam.  This service lets us provide the care you need with a short phone conversation.  If a prescription is necessary we can send it directly to your pharmacy.  If lab work is needed we can place an order for that and you can then stop by our lab to have the test done at a later time.    Telephone visits are billed at different rates depending on your insurance coverage. During this emergency period, for some insurers they may be billed the same as an in-person visit.  Please reach out to your insurance provider with any questions.    If during the course of the call the physician/provider feels a telephone visit is not appropriate, you will not be charged for this service.\"    Patient has given verbal consent for Telephone visit?  Yes    What phone number would you like to be contacted at? [phone]      How would you like to obtain your AVS? Edith    Phone call duration: *** minutes    {signature options:[id number]}    "

## 2020-08-13 NOTE — LETTER
"    8/13/2020         RE: Florian Lawrence  [facility] [facility] [address]        Dear Colleague,    Thank you for referring your patient, Florian Lawrence, to the [facility]. Please see a copy of my visit note below.    Florian Lawrence is being evaluated via a billable telephone visit.     The patient has been notified of following:     \"This telephone visit will be conducted via a call between you and your physician/provider. We have found that certain health care needs can be provided without the need for a physical exam.  This service lets us provide the care you need with a short phone conversation.  If a prescription is necessary we can send it directly to your pharmacy.  If lab work is needed we can place an order for that and you can then stop by our lab to have the test done at a later time.    If during the course of the call the physician/provider feels a telephone visit is not appropriate, you will not be charged for this service.\"     Patient has given verbal consent for Telephone visit?  Yes    CC:  No chief complaint on file.    SUBJECTIVE:   Florian Lawrence for follow up of his right hip,  total hip arthroplasty. DOS 10/28/19  He went to the wrong clinic, so labs and xrays were done there, and we are reviewing them by phone     8/12/2020:  Severe pain awoke Monday morning (8/10/20) with lateral pain by the beltline.  Limping. Pain just above the incision, lateral.  Different than the previous groin pains, which had disappeared.  This is constant pain.  No fever/chills.  Not feeling sick. No systemic symptoms.    Pain to roll onto that side in bed. Some pain with weight bearing.  Not sure if there is abduction weakness.  No puffiness or swelling.      TODAY: no change in symptoms     11/5/19 note: while he was trying to get out of a chair. He felt a sudden snap in his right hip. He can't pinpoint where he felt the snapping. He noticed a \"line\" down his leg.     He gets " "occasional sharp pain in the groin area but nothing like the previous episode he had  had persistent drainage postoperatively from the drain site in the hospital, and was put on Keflex for 7 days      5/12/20 note: He did have his right hip aspirated with Dr. Terry on 5/6/20.  A very limited amount of fluid was obtained, and swab cultures of the fluid were sent     Labs: his APR's were elevated, thus aspiration ordered     His pain got a lot better in early may, with no more fever/chills.     Synovasure was not done due to insufficient fluid.     I think that the bacteria in the aspiration are skin contaminants.  We could attribute his pain 3 weeks ago to a pulled muscle, and the febrile episodes with elevated APR's to an upper respiratory issue, and he continues to have a cough.  With the coincidence of these symptoms, that theory seems a bit far-fetched.  But the lack of fluid and the resolution of symptoms support a theory other than a septic total hip arthroplasty.     7/8/20 note:   He is still not back to where he was before the pain started, and still  has stiffness getting up from a chair or bed, but otherwise has no pain walking.  Better than last visit in May.  Occasional sharp pains in groin.  Pain is much better than before surgery.  Able to walk about a mile.     Review of Systems:  Constitutional/General: Negative for fever, chills, change in weight  Integumentary/Skin: Negative for worrisome rashes, moles, or lesions  Neuro: Negative for weakness, dizziness, or paresthesias   Psychiatric: negative for changes in mood or affect    OBJECTIVE:  He took his temp today 98.3  Says there is some \"puffiness\" laterally.    Xray 2/14/19, lumbar spine: mild degenerative changes     Xrays today show the components in good position, with some mild heterotopic ossification, including laterally where the screws were removed.  No cystic changes and good bone incorporation, with no loosening.    Labs:  CRP " inflammation   Order: [id number]   Status: Final result Visible to patient: No (not released) Dx: Pain due to total hip replacement, se...      Ref Range & Units  11:23 AM   3mo ago     CRP Inflammation  0.0 - 8.0 mg/L  159.0 73.9   [facility]ing Agency   [facility]        Specimen Collected: 08/13/20 11:23 AM  Last Resulted: 08/13/20 11:55 AM    Lab Flowsheet   Order Details   View Encounter   Lab and Collection Details   Routing   Result History       Erythrocyte sedimentation rate auto   Order: [id number]   Status: Final result Visible to patient: No (not released) Dx: Pain due to total hip replacement, se...      Ref Range & Units  11:23 AM   3mo ago     Sed Rate  0 - 20 mm/h  60 68   [facility]ing Agency   [facility]        Specimen Collected: 08/13/20 11:23 AM  Last Resulted: 08/13/20 11:49 AM    Lab Flowsheet   Order Details   View Encounter   Lab and Collection Details   Routing   Result History       CBC with platelets differential   Order: [id number]   Status: Final result Visible to patient: No (not released) Dx: Pain due to total hip replacement, se...      Ref Range & Units  11:23 AM   3mo ago     WBC  4.0 - 11.0 10e9/L  8.7  7.4    RBC Count  4.4 - 5.9 10e12/L  4.48  4.49    Hemoglobin  13.3 - 17.7 g/dL  12.8 13.8    Hematocrit  40.0 - 53.0 %  38.3 40.0    MCV  78 - 100 fl  86  89    MCH  26.5 - 33.0 pg  28.6  30.7    MCHC  31.5 - 36.5 g/dL  33.4  34.5    RDW  10.0 - 15.0 %  13.7  13.0    Platelet Count  150 - 450 10e9/L  254  367    Diff Method   Automated Method  Automated Method    % Neutrophils  %  80.4  79.1    % Lymphocytes  %  9.7  9.9    % Monocytes  %  8.1  9.8    % Eosinophils  %  0.9  0.9    % Basophils  %  0.6  0.3    % Immature Granulocytes  %  0.3     Absolute Neutrophil  1.6 - 8.3 10e9/L  7.0  5.8    Absolute Lymphocytes  0.8 - 5.3 10e9/L  0.9  0.7   Absolute Monocytes  0.0 - 1.3 10e9/L  0.7  0.7    Absolute Eosinophils  0.0 - 0.7 10e9/L  0.1  0.1    Absolute Basophils  0.0 - 0.2 10e9/L  0.1  0.0    Abs  Immature Granulocytes  0 - 0.4 10e9/L  0.0         ASSESSMENT:   It is once again a definite possibility that this pain is due to a septic total hip arthroplasty.   He now has no other systemic reason for elevated APR's, like he seemed to have last time.     PLAN:   I have recommended a repeat right hip aspiration, this time by IR.    Gram stain, an and aerobic cultures, fungal cultures, cell count and synovasure requested.  Likely to be done at the .    Return to clinic: to discuss the results    MARITA Curry MD  Dept. Orthopedic Surgery  [facility]         Again, thank you for allowing me to participate in the care of your patient.        Sincerely,        Atul Curry MD

## 2020-08-19 LAB
BACTERIA SPEC CULT: NO GROWTH
SPECIMEN SOURCE: NORMAL

## 2020-08-20 DIAGNOSIS — Z11.59 ENCOUNTER FOR SCREENING FOR OTHER VIRAL DISEASES: Primary | ICD-10-CM

## 2020-08-24 DIAGNOSIS — Z11.59 ENCOUNTER FOR SCREENING FOR OTHER VIRAL DISEASES: ICD-10-CM

## 2020-08-24 PROCEDURE — U0003 INFECTIOUS AGENT DETECTION BY NUCLEIC ACID (DNA OR RNA); SEVERE ACUTE RESPIRATORY SYNDROME CORONAVIRUS 2 (SARS-COV-2) (CORONAVIRUS DISEASE [COVID-19]), AMPLIFIED PROBE TECHNIQUE, MAKING USE OF HIGH THROUGHPUT TECHNOLOGIES AS DESCRIBED BY CMS-2020-01-R: HCPCS | Performed by: ORTHOPAEDIC SURGERY

## 2020-08-25 LAB
SARS-COV-2 RNA SPEC QL NAA+PROBE: NOT DETECTED
SPECIMEN SOURCE: NORMAL

## 2020-08-27 ENCOUNTER — ANCILLARY PROCEDURE (OUTPATIENT)
Dept: GENERAL RADIOLOGY | Facility: CLINIC | Age: 65
End: 2020-08-27
Attending: ORTHOPAEDIC SURGERY
Payer: MEDICARE

## 2020-08-27 DIAGNOSIS — T84.84XS PAIN DUE TO TOTAL HIP REPLACEMENT, SEQUELA: ICD-10-CM

## 2020-08-27 DIAGNOSIS — Z96.649 PAIN DUE TO TOTAL HIP REPLACEMENT, SEQUELA: ICD-10-CM

## 2020-08-27 RX ORDER — LIDOCAINE HYDROCHLORIDE 10 MG/ML
30 INJECTION, SOLUTION EPIDURAL; INFILTRATION; INTRACAUDAL; PERINEURAL ONCE
Status: COMPLETED | OUTPATIENT
Start: 2020-08-27 | End: 2020-08-27

## 2020-08-27 RX ADMIN — LIDOCAINE HYDROCHLORIDE 5 ML: 10 INJECTION, SOLUTION EPIDURAL; INFILTRATION; INTRACAUDAL; PERINEURAL at 10:47

## 2020-09-30 DIAGNOSIS — I10 HYPERTENSION GOAL BP (BLOOD PRESSURE) < 140/90: ICD-10-CM

## 2020-09-30 DIAGNOSIS — E11.42 CONTROLLED TYPE 2 DIABETES MELLITUS WITH DIABETIC POLYNEUROPATHY, WITHOUT LONG-TERM CURRENT USE OF INSULIN (H): ICD-10-CM

## 2020-09-30 RX ORDER — LOSARTAN POTASSIUM 25 MG/1
25 TABLET ORAL DAILY
Qty: 180 TABLET | Refills: 1 | Status: SHIPPED | OUTPATIENT
Start: 2020-09-30 | End: 2021-10-22

## 2020-11-01 ENCOUNTER — TRANSFERRED RECORDS (OUTPATIENT)
Dept: MULTI SPECIALTY CLINIC | Facility: CLINIC | Age: 65
End: 2020-11-01

## 2020-11-01 LAB — RETINOPATHY: NORMAL

## 2020-11-03 ENCOUNTER — OFFICE VISIT (OUTPATIENT)
Dept: OPTOMETRY | Facility: CLINIC | Age: 65
End: 2020-11-03
Payer: MEDICARE

## 2020-11-03 DIAGNOSIS — E11.9 TYPE 2 DIABETES MELLITUS WITHOUT RETINOPATHY (H): ICD-10-CM

## 2020-11-03 DIAGNOSIS — Z01.01 ENCOUNTER FOR EXAMINATION OF EYES AND VISION WITH ABNORMAL FINDINGS: Primary | ICD-10-CM

## 2020-11-03 DIAGNOSIS — Z46.0 CONTACT LENS/GLASSES FITTING: ICD-10-CM

## 2020-11-03 DIAGNOSIS — H52.4 PRESBYOPIA: ICD-10-CM

## 2020-11-03 DIAGNOSIS — H52.13 MYOPIA OF BOTH EYES: ICD-10-CM

## 2020-11-03 DIAGNOSIS — H52.223 REGULAR ASTIGMATISM OF BOTH EYES: ICD-10-CM

## 2020-11-03 PROCEDURE — 92004 COMPRE OPH EXAM NEW PT 1/>: CPT | Performed by: OPTOMETRIST

## 2020-11-03 PROCEDURE — 92310 CONTACT LENS FITTING OU: CPT | Mod: GA | Performed by: OPTOMETRIST

## 2020-11-03 PROCEDURE — 92015 DETERMINE REFRACTIVE STATE: CPT | Performed by: OPTOMETRIST

## 2020-11-03 ASSESSMENT — VISUAL ACUITY
OD_SC: 20/500
OS_CC: 20/20-1
OD_CC: 20/20
OS_SC: 20/500
OS_CC: 20/20
METHOD: SNELLEN - LINEAR
CORRECTION_TYPE: GLASSES
OD_CC: 20/20

## 2020-11-03 ASSESSMENT — REFRACTION_MANIFEST
OS_CYLINDER: +0.75
OD_SPHERE: -4.75
OD_AXIS: 068
OS_ADD: +2.75
OD_CYLINDER: +1.00
OD_ADD: +2.75
OS_AXIS: 124
OS_SPHERE: -5.25

## 2020-11-03 ASSESSMENT — REFRACTION_CURRENTRX
OD_CYLINDER: -0.75
OD_BRAND: COOPER BIOFINITY TORIC BC 8.7, D 14.5
OS_AXIS: 020
OS_BRAND: COOPER BIOFINITY TORIC BC 8.7, D 14.5
OS_SPHERE: -4.00
OS_CYLINDER: -0.75
OD_SPHERE: -3.50
OD_AXIS: 160

## 2020-11-03 ASSESSMENT — SLIT LAMP EXAM - LIDS
COMMENTS: 1+ MEIBOMIAN GLAND DYSFUNCTION
COMMENTS: 1+ MEIBOMIAN GLAND DYSFUNCTION

## 2020-11-03 ASSESSMENT — REFRACTION_WEARINGRX
SPECS_TYPE: PAL
OD_SPHERE: -4.50
OD_CYLINDER: +0.75
OS_SPHERE: -5.00
OS_CYLINDER: +0.75
OS_ADD: +3.00
OS_AXIS: 112
OD_AXIS: 079
OD_ADD: +3.00

## 2020-11-03 ASSESSMENT — CONF VISUAL FIELD
OD_NORMAL: 1
METHOD: COUNTING FINGERS
OS_NORMAL: 1

## 2020-11-03 ASSESSMENT — CUP TO DISC RATIO
OS_RATIO: 0.3
OD_RATIO: 0.35

## 2020-11-03 ASSESSMENT — TONOMETRY
OS_IOP_MMHG: 16
IOP_METHOD: APPLANATION
OD_IOP_MMHG: 15

## 2020-11-03 ASSESSMENT — EXTERNAL EXAM - LEFT EYE: OS_EXAM: NORMAL

## 2020-11-03 ASSESSMENT — EXTERNAL EXAM - RIGHT EYE: OD_EXAM: NORMAL

## 2020-11-03 NOTE — PROGRESS NOTES
Chief Complaint   Patient presents with     Contact Lens Evaluation     Diabetic Eye Exam     Lab Results   Component Value Date    A1C 6.7 06/30/2020    A1C 8.1 03/25/2020    A1C 6.8 10/14/2019    A1C 7.1 06/20/2019    A1C 7.3 04/11/2019          Previous contact lens wearer? Yes: [employer] Toric  Comfort of contact lenses :Prefers to wear glasses   Satisfied with current lenses: Yes - Patient prefers to work on his computer with contacts and readers +1.50      Last Eye Exam: 1 year  Dilated Previously: Yes    What are you currently using to see?  glasses and contacts (prefers contacts)    Distance Vision Acuity: Satisfied with vision    Near Vision Acuity: Satisfied with vision while reading  with contacts.  If he wears his glasses constantly moving head up and down.    Eye Comfort: Patient has been told he has a little dry eye  Do you use eye drops? : No  Occupation or Hobbies:        Cori Cardoza  [employer]       Medical, surgical and family histories reviewed and updated 11/3/2020.       OBJECTIVE: See Ophthalmology exam    ASSESSMENT:    ICD-10-CM    1. Encounter for examination of eyes and vision with abnormal findings  Z01.01    2. Type 2 diabetes mellitus without retinopathy (H)  E11.9    3. Myopia of both eyes  H52.13    4. Regular astigmatism of both eyes  H52.223    5. Presbyopia  H52.4    6. Contact lens/glasses fitting  Z46.0       PLAN:     Patient Instructions   Updated glasses prescription provided today. Optional to fill, minimal change from current glasses.     Updated contact lens prescription provided today. Mild increase in power each eye.   Continue use of OTC reading glasses, as needed, over the contact lenses.   Clean lenses nightly in solution.  Maximum wear time of 12 hours/day.  Replace lenses monthly.     Patient educated on importance of good blood sugar control.  Letter sent to primary care provider with diabetic eye exam report.       Return in 1 year for  comprehensive eye exam, or sooner if needed.     The effects of the dilating drops last for 4- 6 hours.  You will be more sensitive to light and vision will be blurry up close.  Mydriatic sunglasses were given if needed.    Garry Nelson, [address]  [place]  [address]    [phone]

## 2020-11-03 NOTE — PATIENT INSTRUCTIONS
Updated glasses prescription provided today. Optional to fill, minimal change from current glasses.     Updated contact lens prescription provided today. Mild increase in power each eye.   Continue use of OTC reading glasses, as needed, over the contact lenses.   Clean lenses nightly in solution.  Maximum wear time of 12 hours/day.  Replace lenses monthly.     Patient educated on importance of good blood sugar control.  Letter sent to primary care provider with diabetic eye exam report.       Return in 1 year for comprehensive eye exam, or sooner if needed.     The effects of the dilating drops last for 4- 6 hours.  You will be more sensitive to light and vision will be blurry up close.  Mydriatic sunglasses were given if needed.    Garry Nelson, HEVER  [address] [place]  [address] [phone]

## 2020-11-03 NOTE — LETTER
11/3/2020         RE: Florian Lawrence  [address] [address]        Dear Colleague,    Thank you for referring your patient, Florian Lawrence, to the [facility]. Please see a copy of my visit note below.    Chief Complaint   Patient presents with     Contact Lens Evaluation     Diabetic Eye Exam     Lab Results   Component Value Date    A1C 6.7 06/30/2020    A1C 8.1 03/25/2020    A1C 6.8 10/14/2019    A1C 7.1 06/20/2019    A1C 7.3 04/11/2019          Previous contact lens wearer? Yes: [employer] Toric  Comfort of contact lenses :Prefers to wear glasses   Satisfied with current lenses: Yes - Patient prefers to work on his computer with contacts and readers +1.50      Last Eye Exam: 1 year  Dilated Previously: Yes    What are you currently using to see?  glasses and contacts (prefers contacts)    Distance Vision Acuity: Satisfied with vision    Near Vision Acuity: Satisfied with vision while reading  with contacts.  If he wears his glasses constantly moving head up and down.    Eye Comfort: Patient has been told he has a little dry eye  Do you use eye drops? 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       OBJECTIVE: See Ophthalmology exam    ASSESSMENT:    ICD-10-CM    1. Encounter for examination of eyes and vision with abnormal findings  Z01.01    2. Type 2 diabetes mellitus without retinopathy (H)  E11.9    3. Myopia of both eyes  H52.13    4. Regular astigmatism of both eyes  H52.223    5. Presbyopia  H52.4    6. Contact lens/glasses fitting  Z46.0       PLAN:     Patient Instructions   Updated glasses prescription provided today. Optional to fill, minimal change from current glasses.     Updated contact lens prescription provided today. Mild increase in power each eye.   Continue use of OTC reading glasses, as needed, over the contact lenses.    Clean lenses nightly in solution.  Maximum wear time of 12 hours/day.  Replace lenses monthly.     Patient educated on importance of good blood sugar control.  Letter sent to primary care provider with diabetic eye exam report.       Return in 1 year for comprehensive eye exam, or sooner if needed.     The effects of the dilating drops last for 4- 6 hours.  You will be more sensitive to light and vision will be blurry up close.  Mydriatic sunglasses were given if needed.    Garry Nelson OD  [address] [place]  [address]    [phone]                           Again, thank you for allowing me to participate in the care of your patient.        Sincerely,        Garry Nelson OD

## 2020-11-16 ENCOUNTER — HEALTH MAINTENANCE LETTER (OUTPATIENT)
Age: 65
End: 2020-11-16

## 2020-12-18 ENCOUNTER — OFFICE VISIT (OUTPATIENT)
Dept: INTERNAL MEDICINE | Facility: CLINIC | Age: 65
End: 2020-12-18
Payer: MEDICARE

## 2020-12-18 VITALS
OXYGEN SATURATION: 96 % | HEIGHT: 71 IN | RESPIRATION RATE: 16 BRPM | WEIGHT: 261 LBS | HEART RATE: 87 BPM | SYSTOLIC BLOOD PRESSURE: 133 MMHG | DIASTOLIC BLOOD PRESSURE: 71 MMHG | BODY MASS INDEX: 36.54 KG/M2 | TEMPERATURE: 98.1 F

## 2020-12-18 DIAGNOSIS — Z96.641 STATUS POST TOTAL REPLACEMENT OF RIGHT HIP: ICD-10-CM

## 2020-12-18 DIAGNOSIS — E11.42 CONTROLLED TYPE 2 DIABETES MELLITUS WITH DIABETIC POLYNEUROPATHY, WITHOUT LONG-TERM CURRENT USE OF INSULIN (H): Primary | ICD-10-CM

## 2020-12-18 DIAGNOSIS — K40.90 NON-RECURRENT UNILATERAL INGUINAL HERNIA WITHOUT OBSTRUCTION OR GANGRENE: ICD-10-CM

## 2020-12-18 DIAGNOSIS — E78.5 HYPERLIPIDEMIA LDL GOAL <100: ICD-10-CM

## 2020-12-18 DIAGNOSIS — Z23 NEED FOR PROPHYLACTIC VACCINATION AND INOCULATION AGAINST INFLUENZA: ICD-10-CM

## 2020-12-18 LAB
CHOLEST SERPL-MCNC: 163 MG/DL
HBA1C MFR BLD: 7.2 % (ref 0–5.6)
HDLC SERPL-MCNC: 30 MG/DL
LDLC SERPL CALC-MCNC: ABNORMAL MG/DL
LDLC SERPL DIRECT ASSAY-MCNC: 72 MG/DL
NONHDLC SERPL-MCNC: 133 MG/DL
TRIGL SERPL-MCNC: 438 MG/DL

## 2020-12-18 PROCEDURE — G0008 ADMIN INFLUENZA VIRUS VAC: HCPCS | Performed by: INTERNAL MEDICINE

## 2020-12-18 PROCEDURE — 80061 LIPID PANEL: CPT | Performed by: INTERNAL MEDICINE

## 2020-12-18 PROCEDURE — 99214 OFFICE O/P EST MOD 30 MIN: CPT | Mod: 25 | Performed by: INTERNAL MEDICINE

## 2020-12-18 PROCEDURE — 83036 HEMOGLOBIN GLYCOSYLATED A1C: CPT | Performed by: INTERNAL MEDICINE

## 2020-12-18 PROCEDURE — 90662 IIV NO PRSV INCREASED AG IM: CPT | Performed by: INTERNAL MEDICINE

## 2020-12-18 PROCEDURE — 83721 ASSAY OF BLOOD LIPOPROTEIN: CPT | Mod: 59 | Performed by: INTERNAL MEDICINE

## 2020-12-18 PROCEDURE — 36415 COLL VENOUS BLD VENIPUNCTURE: CPT | Performed by: INTERNAL MEDICINE

## 2020-12-18 RX ORDER — CLINDAMYCIN HCL 150 MG
CAPSULE ORAL
Qty: 3 CAPSULE | Refills: 1 | Status: SHIPPED | OUTPATIENT
Start: 2020-12-18 | End: 2021-10-22

## 2020-12-18 ASSESSMENT — MIFFLIN-ST. JEOR: SCORE: 1991.02

## 2020-12-18 NOTE — PROGRESS NOTES
Subjective     Florian Lawrence is a 65 year old male who presents to clinic today for the following health issues:    History of Present Illness       Diabetes:   He presents for follow up of diabetes.  He is not checking blood glucose. He checks blood glucose before and after meals.  Blood glucose is never over 200 and never under 70. He is aware of hypoglycemia symptoms including shakiness, dizziness and weakness. He has no concerns regarding his diabetes at this time.  He is having numbness in feet. The patient has had a diabetic eye exam in the last 12 months. Eye exam performed on few months ago . Location of last eye exam [facility] .        He eats 2-3 servings of fruits and vegetables daily.He consumes 0 sweetened beverage(s) daily.He exercises with enough effort to increase his heart rate 60 or more minutes per day.  He exercises with enough effort to increase his heart rate 7 days per week. [unfilled] is missing 1 dose(s) of medications per week.    He is not taking prescribed medications regularly due to remembering to take.    Today is a diabetic follow up office visit. It's been 6 months. Patient feels things are going well and he has no serious questions about his diabetes mellitus. Continues with self-monitoring of blood glucose and is pleased overall with his numbers . He has some occasional elevated morning fasting blood glucoses but this is chronic and not worse and his hemoglobin a1c  [ diabetes test ] is ok today..      Possibility has anew problem of a probable small inguinal hernia since around July or August of 2020. tweaky pain and he's noted this symptoms again and again , especially for example such as blowing his nose    Lab Results   Component Value Date    A1C 7.2 12/18/2020    A1C 6.7 06/30/2020    A1C 8.1 03/25/2020    A1C 6.8 10/14/2019    A1C 7.1 06/20/2019     Wt Readings from Last 5 Encounters:   12/18/20 118.4 kg (261 lb)   07/06/20 113.2 kg (249 lb 9.6 oz)   05/06/20  "116.6 kg (257 lb)   12/17/19 116.6 kg (257 lb)   11/05/19 116.6 kg (257 lb)     Body mass index is 36.4 kg/m .      Review of Systems   Constitutional, HEENT, cardiovascular, pulmonary, gi and gu systems are negative, except as otherwise noted.      Objective    /71   Pulse 87   Temp 98.1  F (36.7  C) (Oral)   Resp 16   Ht 1.803 m (5' 11\")   Wt 118.4 kg (261 lb)   SpO2 96%   BMI 36.40 kg/m    Body mass index is 36.4 kg/m .  Physical Exam   GENERAL: healthy, alert and no distress  EYES: Eyes grossly normal to inspection, PERRL and conjunctivae and sclerae normal  ABDOMEN: soft, nontender, no hepatosplenomegaly, no masses and bowel sounds normal  MS: no gross musculoskeletal defects noted, no edema, he has a small easily reducible inguinal hernia on the right side inguinal crease   NEURO: Normal strength and tone, mentation intact and speech normal  PSYCH: mentation appears normal, affect normal/bright            Assessment & Plan     Controlled type 2 diabetes mellitus with diabetic polyneuropathy, without long-term current use of insulin (H)  His diabetes mellitus is controlled within acceptable limits , problem is stable and ongoing monitoring    - Hemoglobin A1c  - insulin pen needle (32G X 6 MM) 32G X 6 MM miscellaneous; Use 1 pen needles daily or as directed.    Status post total replacement of right hip  He has need for prophylactic antibiotic prescription , see prior office visit notes with MARITA Curry MD, orthopedic surgeon   - clindamycin (CLEOCIN) 150 MG capsule; Take 3 caps by mouth one hour prior to dental appt    Hyperlipidemia LDL goal <100  Due for this test / procedure / healthcare maintenance   - Lipid panel reflex to direct LDL Non-fasting    Non-recurrent unilateral inguinal hernia without obstruction or gangrene  Exam consistent with small inguinal hernia . Recommended to see general surgery for an opinion regarding herniorrhaphy potential   - GENERAL SURG ADULT REFERRAL; " Future    Need for prophylactic vaccination and inoculation against influenza  Administered   - FLUZONE HIGH DOSE 65+  [48446]       Return in about 6 months (around 6/18/2021).    Cecilio Pro MD  [facility]

## 2020-12-18 NOTE — LETTER
December 21, 2020      Florian Lawrence  [address] [address]        Dear ,    We are writing to inform you of your test results.    All of these tests are within acceptable limits , things look good !       Resulted Orders   Hemoglobin A1c   Result Value Ref Range    Hemoglobin A1C 7.2 (H) 0 - 5.6 %      Comment:      Normal <5.7% Prediabetes 5.7-6.4%  Diabetes 6.5% or higher - adopted from ADA   consensus guidelines.     Lipid panel reflex to direct LDL Non-fasting   Result Value Ref Range    Cholesterol 163 <200 mg/dL    Triglycerides 438 (H) <150 mg/dL      Comment:      Borderline high:  150-199 mg/dl  High:             200-499 mg/dl  Very high:       >499 mg/dl      HDL Cholesterol 30 (L) >39 mg/dL    LDL Cholesterol Calculated  <100 mg/dL     Cannot estimate LDL when triglyceride exceeds 400 mg/dL    Non HDL Cholesterol 133 (H) <130 mg/dL      Comment:      Above Desirable:  130-159 mg/dl  Borderline high:  160-189 mg/dl  High:             190-219 mg/dl  Very high:       >219 mg/dl     LDL cholesterol direct   Result Value Ref Range    LDL Cholesterol Direct 72 <100 mg/dL      Comment:      Desirable:       <100 mg/dl       If you have any questions or concerns, please call the clinic at the number listed above.       Sincerely,      Cecilio Pro MD/sherron

## 2020-12-28 ENCOUNTER — OFFICE VISIT (OUTPATIENT)
Dept: SURGERY | Facility: CLINIC | Age: 65
End: 2020-12-28
Payer: MEDICARE

## 2020-12-28 ENCOUNTER — TELEPHONE (OUTPATIENT)
Dept: SURGERY | Facility: CLINIC | Age: 65
End: 2020-12-28

## 2020-12-28 VITALS
SYSTOLIC BLOOD PRESSURE: 119 MMHG | BODY MASS INDEX: 35.29 KG/M2 | WEIGHT: 253 LBS | HEART RATE: 83 BPM | DIASTOLIC BLOOD PRESSURE: 64 MMHG

## 2020-12-28 DIAGNOSIS — K40.20 BILATERAL INGUINAL HERNIA WITHOUT OBSTRUCTION OR GANGRENE, RECURRENCE NOT SPECIFIED: Primary | ICD-10-CM

## 2020-12-28 DIAGNOSIS — K42.9 UMBILICAL HERNIA WITHOUT OBSTRUCTION AND WITHOUT GANGRENE: ICD-10-CM

## 2020-12-28 DIAGNOSIS — Z20.822 ENCOUNTER FOR LABORATORY TESTING FOR COVID-19 VIRUS: ICD-10-CM

## 2020-12-28 PROCEDURE — 99204 OFFICE O/P NEW MOD 45 MIN: CPT | Performed by: SURGERY
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
## 2020-12-29 NOTE — TELEPHONE ENCOUNTER
Type of surgery: robotic assisted laparoscopic bilateral inguinal hernia repair possible open,open umbilical hernia repair  CPT 75367, 30322, 55785    Bilateral inguinal hernia without obstruction or gangrene, recurrence not specified K40.20     Umbilical hernia without obstruction and without gangrene K42.9    Location of surgery: [facility]  Date and time of surgery: 1-21-21   9:30am  Surgeon: Dr Lerner  Pre-Op Appt Date: 1-11-21  Post-Op Appt Date: 2-8-21   Packet sent out: Yes  Pre-cert/Authorization completed:  No prior auth is required per Medicare.  Date: 12-29-20    Thank you,  Isabella Galo  Prior Authorization Dept  [phone]

## 2021-01-08 DIAGNOSIS — E78.5 HYPERLIPIDEMIA LDL GOAL <100: ICD-10-CM

## 2021-01-11 ENCOUNTER — OFFICE VISIT (OUTPATIENT)
Dept: INTERNAL MEDICINE | Facility: CLINIC | Age: 66
End: 2021-01-11
Payer: COMMERCIAL

## 2021-01-11 VITALS
DIASTOLIC BLOOD PRESSURE: 78 MMHG | OXYGEN SATURATION: 98 % | RESPIRATION RATE: 16 BRPM | BODY MASS INDEX: 35.7 KG/M2 | HEIGHT: 71 IN | SYSTOLIC BLOOD PRESSURE: 142 MMHG | HEART RATE: 76 BPM | TEMPERATURE: 97.8 F | WEIGHT: 255 LBS

## 2021-01-11 DIAGNOSIS — Z01.818 PRE-OPERATIVE GENERAL PHYSICAL EXAMINATION: Primary | ICD-10-CM

## 2021-01-11 LAB
ANION GAP SERPL CALCULATED.3IONS-SCNC: 2 MMOL/L (ref 3–14)
BUN SERPL-MCNC: 22 MG/DL (ref 7–30)
CALCIUM SERPL-MCNC: 9.7 MG/DL (ref 8.5–10.1)
CHLORIDE SERPL-SCNC: 107 MMOL/L (ref 94–109)
CO2 SERPL-SCNC: 27 MMOL/L (ref 20–32)
CREAT SERPL-MCNC: 0.88 MG/DL (ref 0.66–1.25)
GFR SERPL CREATININE-BSD FRML MDRD: 89 ML/MIN/{1.73_M2}
GLUCOSE SERPL-MCNC: 127 MG/DL (ref 70–99)
HGB BLD-MCNC: 14.2 G/DL (ref 13.3–17.7)
POTASSIUM SERPL-SCNC: 4.5 MMOL/L (ref 3.4–5.3)
SODIUM SERPL-SCNC: 136 MMOL/L (ref 133–144)

## 2021-01-11 PROCEDURE — 99214 OFFICE O/P EST MOD 30 MIN: CPT | Performed by: INTERNAL MEDICINE

## 2021-01-11 PROCEDURE — 80048 BASIC METABOLIC PNL TOTAL CA: CPT | Performed by: INTERNAL MEDICINE

## 2021-01-11 PROCEDURE — 36415 COLL VENOUS BLD VENIPUNCTURE: CPT | Performed by: INTERNAL MEDICINE

## 2021-01-11 PROCEDURE — 85018 HEMOGLOBIN: CPT | Performed by: INTERNAL MEDICINE

## 2021-01-11 RX ORDER — SIMVASTATIN 20 MG
TABLET ORAL
Qty: 90 TABLET | Refills: 1 | Status: SHIPPED | OUTPATIENT
Start: 2021-01-11 | End: 2021-06-25

## 2021-01-11 ASSESSMENT — MIFFLIN-ST. JEOR: SCORE: 1963.8
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
## 2021-01-11 NOTE — PATIENT INSTRUCTIONS
Skip glimepiride [ Amaryl ] morning of the surgery     Do take the Metformin ( Glucophage) both the night before the surgery and morning of the surgery     Do take the Basaglar  (insulin glargine injection) but reduce it to only 40 units     Otherwise take all other regularly scheduled medications as per usual

## 2021-01-11 NOTE — TELEPHONE ENCOUNTER
Prescription approved per [facility] Refill Protocol.  Matilda Zayas RN

## 2021-01-11 NOTE — LETTER
January 12, 2021      Florian Lawrence  [address] [address]        Dear ,    We are writing to inform you of your test results.    All of these tests are within acceptable limits , things look good ! Ok for surgery       Resulted Orders   Basic metabolic panel   Result Value Ref Range    Sodium 136 133 - 144 mmol/L    Potassium 4.5 3.4 - 5.3 mmol/L    Chloride 107 94 - 109 mmol/L    Carbon Dioxide 27 20 - 32 mmol/L    Anion Gap 2 (L) 3 - 14 mmol/L    Glucose 127 (H) 70 - 99 mg/dL    Urea Nitrogen 22 7 - 30 mg/dL    Creatinine 0.88 0.66 - 1.25 mg/dL    GFR Estimate 89 >60 mL/min/[1.73_m2]      Comment:      Non  GFR Calc  Starting 12/18/2018, serum creatinine based estimated GFR (eGFR) will be   calculated using the Chronic Kidney Disease Epidemiology Collaboration   (CKD-EPI) equation.      GFR Estimate If Black >90 >60 mL/min/[1.73_m2]      Comment:       GFR Calc  Starting 12/18/2018, serum creatinine based estimated GFR (eGFR) will be   calculated using the Chronic Kidney Disease Epidemiology Collaboration   (CKD-EPI) equation.      Calcium 9.7 8.5 - 10.1 mg/dL   Hemoglobin   Result Value Ref Range    Hemoglobin 14.2 13.3 - 17.7 g/dL       If you have any questions or concerns, please call the clinic at the number listed above.       Sincerely,      Cecilio Pro MD/sherron

## 2021-01-18 ENCOUNTER — OFFICE VISIT (OUTPATIENT)
Dept: LAB | Facility: CLINIC | Age: 66
End: 2021-01-18
Payer: COMMERCIAL

## 2021-01-18 DIAGNOSIS — Z20.822 ENCOUNTER FOR LABORATORY TESTING FOR COVID-19 VIRUS: ICD-10-CM

## 2021-01-18 LAB
SARS-COV-2 RNA RESP QL NAA+PROBE: NORMAL
SPECIMEN SOURCE: NORMAL

## 2021-01-18 PROCEDURE — 87635 SARS-COV-2 COVID-19 AMP PRB: CPT | Performed by: SURGERY

## 2021-01-19 LAB
LABORATORY COMMENT REPORT: NORMAL
SARS-COV-2 RNA RESP QL NAA+PROBE: NEGATIVE
SPECIMEN SOURCE: NORMAL

## 2021-01-21 ENCOUNTER — TELEPHONE (OUTPATIENT)
Dept: FAMILY MEDICINE | Facility: CLINIC | Age: 66
End: 2021-01-21

## 2021-01-21 NOTE — TELEPHONE ENCOUNTER
Patient called to cancel surgery this morning he was not feeling well. I have left a voice message for patient to call and reschedule at [phone]

## 2021-01-21 NOTE — TELEPHONE ENCOUNTER
"Patient called back, he denies the walls are moving or the room was spinning when he felt dizzy, says he got light headed when he sat up from the bed.    Advised he thus needs to be seen.    Scheduled for tomorrow at 10:50 am \"chart review\" slot.    Advised he continue to monitor, stay hydrated.    Patient verbalized understanding of and agreement with plan.    Ro Bennett RN  [facility]      "

## 2021-01-21 NOTE — TELEPHONE ENCOUNTER
"Call taken from patient, states he was supposed to have hernia surgery today at [facility] but during the \"prep\" he reported he was having dizziness that started at 4 am today, new symptom.   Was sent to the ER, EKG was done, MRI of brain done.    Says they found a 2-3 mm blockage in right internal carotid artery but did not think that would cause the dizziness but needs to be addressed at some point.    Was so dizzy he could not dress himself when leaving the post op area.    Says he got meclizine, gadaobrutol, and zofran and IV fluids in ER.   Was not dizzy by the time he left the ER.  They advised him to follow up with PCP to get this taken care of before rescheduling surgery.    Says he is not dizzy now.   Says he feels like he has to walk carefully in fear of getting dizzy again.    Says his last meal was 6 pm and took 40 units of basaglar at HS.   No insulin or metformin this AM.       Says his blood sugar was 99 at 5 am before he went to pre op prep.    Was 135 during the pre-op.    Wants to see Dr. Pro, his schedule is full today and tomorrow.  He says he could come in this afternoon (lives 15 minutes from  clinic) or tomorrow if Dr. Pro can fit him in.    Advised him to go back to ER if having trouble breathing, severe sudden dizziness, one sided weakness, trouble talking or other alarming symptoms.    Routed to Dr. Pro, any chance you can fit patient in schedule to be seen for this dizzy episode today or tomorrow?    Ro Bennett RN  [facility]              "

## 2021-01-21 NOTE — TELEPHONE ENCOUNTER
I can agree to see patient if it's going to be helpful, we can see him tomorrow  But the more important thing is we need a status report to accurately describe his symptoms. I have a hunch we are talking about vertiginous symptoms here [ like the walls are moving, the range of motion is spinning ]    If so this is good news because this isn't gong to be worrisome or difficult . If there's a definite endoresment of this route lets offer scopolamine (TRANSDERM-SCOP) 1.5 MG patch 1 patch . If this symptoms absolutely do not fit with this whatsoever and are more consistent with lightheadedness [ like rapidly rising and standing up too fast and nearly passing out ] we need to see him    Cecilio Pro MD

## 2021-01-22 ENCOUNTER — OFFICE VISIT (OUTPATIENT)
Dept: INTERNAL MEDICINE | Facility: CLINIC | Age: 66
End: 2021-01-22
Payer: COMMERCIAL

## 2021-01-22 VITALS
TEMPERATURE: 97.5 F | DIASTOLIC BLOOD PRESSURE: 78 MMHG | RESPIRATION RATE: 14 BRPM | OXYGEN SATURATION: 94 % | SYSTOLIC BLOOD PRESSURE: 136 MMHG | BODY MASS INDEX: 35.7 KG/M2 | HEART RATE: 75 BPM | WEIGHT: 256 LBS

## 2021-01-22 DIAGNOSIS — I72.0 CAROTID ANEURYSM, RIGHT (H): ICD-10-CM

## 2021-01-22 DIAGNOSIS — E66.01 MORBID OBESITY (H): ICD-10-CM

## 2021-01-22 DIAGNOSIS — E11.42 CONTROLLED TYPE 2 DIABETES MELLITUS WITH DIABETIC POLYNEUROPATHY, WITHOUT LONG-TERM CURRENT USE OF INSULIN (H): ICD-10-CM

## 2021-01-22 DIAGNOSIS — H81.10 BPV (BENIGN POSITIONAL VERTIGO), UNSPECIFIED LATERALITY: Primary | ICD-10-CM

## 2021-01-22 PROCEDURE — 99214 OFFICE O/P EST MOD 30 MIN: CPT | Performed by: INTERNAL MEDICINE

## 2021-01-22 NOTE — Clinical Note
I concede I am struggling with entirely absorbing the new rubric. This is a case in point. Traditionally given the fact that this is a post emergency room evaluation follow up office visit  I might think level 4 but I mostly just talked with patient.

## 2021-01-22 NOTE — TELEPHONE ENCOUNTER
Patient has been rescheduled for surgery  For 02/25/2021 at [facility]  preop 02/16/2021  Postop 03/15/2021

## 2021-01-22 NOTE — PROGRESS NOTES
Assessment & Plan     BPV (benign positional vertigo), unspecified laterality  Today was an urgent appointment, squeezed in for an urgent appointment. This is because patient had been scheduled for a surgery in order to have his small inguinal hernia repaired. He had gone so far as to be at the [facility] and was through the preoperative area and prior to being pushed down the hallway for surgery he had the acute onset of some potent waves of vertiginous symptoms / rotatory dysequilibrium. He was someone who'd never experienced such symptoms before and understandably was very concerned. After discussion of his with the anaesthesiologist initially the surgery was still on but after discussion with surgeon, this surgery was cancelled and patient sent to emergency room. He had a standard workup including central nervous system radiographic images and other tests [ see care everywhere ]. There was no evidence of cerebrovascular accident and his symptoms fit the diagnosis of vertigo. This is now a couple of days later and his symptoms are clearly improved although not yet resolved. We spent the bulk of the time during this appointment in review of the pathophysiology of vertigo, how this is originates from the inner ear and the usual course of this. Fortunately  This is not serious in the great majority of cases. He's using some meclizine [Antivert] with definite benefit and all in all patient really had very few questions. The other question is can he be rescheduled for his hernia surgery and I encouraged him to definitely do this. Beyond that, the course of a flare-up of vertigo is typically days with sometimes a more minor lingering of symptoms for 1-2 weeks.    Carotid aneurysm, right (H)  An incidental finding with his right carotid artery showed a 20% blockage and this is certainly a] completely an unrelated matter to his vertigo and b] not something that requires specific plan of follow up care beyond  continued attention to treatment of his cardiovascular risk factors which we already do secondary to his prior diagnosis of diabetes mellitus, etc.    Diabetes mellitus - continue current plan of care      Lab Results   Component Value Date    A1C 7.2 12/18/2020    A1C 6.7 06/30/2020    A1C 8.1 03/25/2020    A1C 6.8 10/14/2019    A1C 7.1 06/20/2019     Morbid obesity - continue current plan of care      Body mass index is 35.7 kg/m . CO-MORBID CONDITIONS for a diagnosis of morbid obesity : CHF, DM, impaired fasting glucose/metabolic syndrome, GB disease, GERD, hyperlipidemia, HTN, obstructive sleep apnea, osteoarthritis in weight bearing joint       Review of external notes as documented above   Review of prior external note(s) from - [facility] information from [facility] reviewed    20 minutes spent on the date of the encounter doing chart review and patient visit       Return in about 6 months (around 7/22/2021).    Cecilio Pro MD  [facility] EL Du is a 65 year old who presents to clinic today for the following health issues    HPI   Chief Complaint   Patient presents with     [facility] F/U     [facility]         ED/UC Followup:    Facility:  [facility]  Date of visit: 01/21/2021  Reason for visit: Peripheral vertigo, dizziness   Current Status: still having dizzy spells     It was 4 am Thursday morning [ yesterday ] that he had the acute onset of some new symptoms . Had to make a bathroom trip, he was tumbling in the am with dizziness / vertiginous symptoms, he bounced off of a wall at one point. He made his way back to bed, then he got up via an alarm to get to the hospital for his surgery. He was still a little bit unbalanced but took his shower and got dressed for his hernia surgery    He was getting prepped for surgery     Anaesthesiologist came in  Blood glucose was checked at 5 am and was 99  During prep it was 135  But when he  reviewed his vertiginous symptoms with the anaesthesiologist and initially the surgery was thought to be ok to proceed  But when these symptoms were reviewed with Dr. Nance he decided to cancel the surgery  Because it was an elective surgery, not emergent   He was wheeled over to the emergency room and had a complete workup which was negative and ultimately was diagnosed with vertigo and given Ondansetron (ZOFRAN) and meclizine [Antivert]     Review of Systems   Constitutional, HEENT, cardiovascular, pulmonary, gi and gu systems are negative, except as otherwise noted.      Objective    /78   Pulse 75   Temp 97.5  F (36.4  C) (Oral)   Resp 14   Wt 116.1 kg (256 lb)   SpO2 94%   BMI 35.70 kg/m    Body mass index is 35.7 kg/m .  Physical Exam   GENERAL: healthy, alert and no distress  EYES: Eyes grossly normal to inspection, PERRL and conjunctivae and sclerae normal  MS: no gross musculoskeletal defects noted, no edema  I did not attempt to provoke his symptoms with Errol-Hallpike maneuver given the straightforwards facts of his case    I spent a total of 20 minutes face-to-face with Florian Lawrence during today's office visit.  Over 50% of this time was spent counseling the patient and/or coordinating care regarding   Encounter Diagnoses   Name Primary?     BPV (benign positional vertigo), unspecified laterality Yes     Carotid aneurysm, right (H)      Controlled type 2 diabetes mellitus with diabetic polyneuropathy, without long-term current use of insulin (H)      Morbid obesity (H)     .  See note for details.

## 2021-02-08 DIAGNOSIS — E11.42 CONTROLLED TYPE 2 DIABETES MELLITUS WITH DIABETIC POLYNEUROPATHY, WITHOUT LONG-TERM CURRENT USE OF INSULIN (H): ICD-10-CM

## 2021-02-10 RX ORDER — BLOOD SUGAR DIAGNOSTIC
STRIP MISCELLANEOUS
Qty: 300 STRIP | Refills: 1 | Status: SHIPPED | OUTPATIENT
Start: 2021-02-10 | End: 2021-12-02

## 2021-02-10 NOTE — TELEPHONE ENCOUNTER
Prescription approved per [facility] Refill Protocol.  Matilda Zayas RN

## 2021-02-16 ENCOUNTER — OFFICE VISIT (OUTPATIENT)
Dept: INTERNAL MEDICINE | Facility: CLINIC | Age: 66
End: 2021-02-16
Payer: COMMERCIAL

## 2021-02-16 VITALS
BODY MASS INDEX: 35.7 KG/M2 | DIASTOLIC BLOOD PRESSURE: 82 MMHG | SYSTOLIC BLOOD PRESSURE: 140 MMHG | HEART RATE: 87 BPM | WEIGHT: 256 LBS | TEMPERATURE: 97.8 F | OXYGEN SATURATION: 98 % | RESPIRATION RATE: 16 BRPM

## 2021-02-16 DIAGNOSIS — E11.42 CONTROLLED TYPE 2 DIABETES MELLITUS WITH DIABETIC POLYNEUROPATHY, WITHOUT LONG-TERM CURRENT USE OF INSULIN (H): ICD-10-CM

## 2021-02-16 DIAGNOSIS — Z01.818 PRE-OPERATIVE GENERAL PHYSICAL EXAMINATION: Primary | ICD-10-CM

## 2021-02-16 PROCEDURE — 99213 OFFICE O/P EST LOW 20 MIN: CPT | Performed by: INTERNAL MEDICINE

## 2021-02-16 RX ORDER — GLUCOSAMINE HCL/CHONDROITIN SU 500-400 MG
CAPSULE ORAL
Qty: 100 EACH | Refills: 3 | Status: CANCELLED | OUTPATIENT
Start: 2021-02-16
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
## 2021-02-16 NOTE — PATIENT INSTRUCTIONS
--Patient is to take all scheduled medications on the day of surgery EXCEPT for modifications listed below.     Skip glimepiride [ Amaryl ] morning of the surgery      Do take the Metformin ( Glucophage) both the night before the surgery and morning of the surgery      Do take the Basaglar  (insulin glargine injection) but reduce it to only 40 units      Otherwise take all other regularly scheduled medications as per usual - recommended to take a meclizine [Antivert] tab morning of the surgery just to play it safe

## 2021-02-19 DIAGNOSIS — E11.42 CONTROLLED TYPE 2 DIABETES MELLITUS WITH DIABETIC POLYNEUROPATHY, WITHOUT LONG-TERM CURRENT USE OF INSULIN (H): ICD-10-CM

## 2021-02-19 RX ORDER — GLIMEPIRIDE 4 MG/1
TABLET ORAL
Qty: 90 TABLET | Refills: 1 | Status: SHIPPED | OUTPATIENT
Start: 2021-02-19 | End: 2021-06-25

## 2021-02-22 DIAGNOSIS — Z11.59 ENCOUNTER FOR SCREENING FOR OTHER VIRAL DISEASES: Primary | ICD-10-CM

## 2021-02-22 DIAGNOSIS — Z11.59 ENCOUNTER FOR SCREENING FOR OTHER VIRAL DISEASES: ICD-10-CM

## 2021-02-22 LAB
SARS-COV-2 RNA RESP QL NAA+PROBE: NORMAL
SPECIMEN SOURCE: NORMAL

## 2021-02-22 PROCEDURE — 87635 SARS-COV-2 COVID-19 AMP PRB: CPT | Performed by: SURGERY

## 2021-03-22 ENCOUNTER — OFFICE VISIT (OUTPATIENT)
Dept: SURGERY | Facility: CLINIC | Age: 66
End: 2021-03-22
Payer: COMMERCIAL

## 2021-03-22 VITALS
WEIGHT: 253 LBS | HEART RATE: 74 BPM | SYSTOLIC BLOOD PRESSURE: 126 MMHG | BODY MASS INDEX: 35.29 KG/M2 | DIASTOLIC BLOOD PRESSURE: 78 MMHG

## 2021-03-22 DIAGNOSIS — Z87.19 S/P BILATERAL INGUINAL HERNIA REPAIR, FOLLOW-UP EXAM: Primary | ICD-10-CM

## 2021-03-22 DIAGNOSIS — Z09 S/P UMBILICAL HERNIA REPAIR, FOLLOW-UP EXAM: ICD-10-CM

## 2021-03-22 DIAGNOSIS — Z09 S/P BILATERAL INGUINAL HERNIA REPAIR, FOLLOW-UP EXAM: Primary | ICD-10-CM

## 2021-03-22 PROCEDURE — 99024 POSTOP FOLLOW-UP VISIT: CPT | Performed by: SURGERY

## 2021-03-22 NOTE — LETTER
3/22/2021         RE: Florian Lawrence  [address] [address]        Dear Colleague,    Thank you for referring your patient, Florian Lawrence, to the [facility]. Please see a copy of my visit note below.    General Surgery Post Op    Pt returns for follow up visit s/p robotic bilateral inguinal and open umbilical with mesh on 2/25/21.    Patient has been doing well, tolerating diet. Bowels moving well. Pain controlled. The right incision had a little drainage this morning as scab is loosening otherwise no wound concerns    Physical exam: Vitals: /78   Pulse 74   Wt 114.8 kg (253 lb)   BMI 35.29 kg/m    BMI= Body mass index is 35.29 kg/m .    Exam:  Constitutional: healthy, alert and no distress  Gastrointestinal: Abdomen soft, non-tender. BS normal. No masses, organomegaly  Incisions with localized redness at incision only, no cellulitis or concern for infection. Right sided incision with scab, other two have fallen off.      Assessment:     ICD-10-CM    1. S/P bilateral inguinal hernia repair, follow-up exam  Z09    2. S/P umbilical hernia repair, follow-up exam  Z09      Plan: Recovering well from his hernia surgery.  I advised to put a Band-Aid rather bandage over this right-sided incision.  Does not appear to have any infection and expect this to heal without problem.  If he does notice other concerns with incisions or otherwise he can let me know otherwise we will plan for follow-ups as needed.  Okay for him to increase his activities and lifting as tolerated.    Everette Lerner MD          Again, thank you for allowing me to participate in the care of your patient.        Sincerely,        Everette Lerner MD

## 2021-03-22 NOTE — PROGRESS NOTES
General Surgery Post Op    Pt returns for follow up visit s/p robotic bilateral inguinal and open umbilical with mesh on 2/25/21.    Patient has been doing well, tolerating diet. Bowels moving well. Pain controlled. The right incision had a little drainage this morning as scab is loosening otherwise no wound concerns    Physical exam: Vitals: /78   Pulse 74   Wt 114.8 kg (253 lb)   BMI 35.29 kg/m    BMI= Body mass index is 35.29 kg/m .    Exam:  Constitutional: healthy, alert and no distress  Gastrointestinal: Abdomen soft, non-tender. BS normal. No masses, organomegaly  Incisions with localized redness at incision only, no cellulitis or concern for infection. Right sided incision with scab, other two have fallen off.      Assessment:     ICD-10-CM    1. S/P bilateral inguinal hernia repair, follow-up exam  Z09    2. S/P umbilical hernia repair, follow-up exam  Z09      Plan: Recovering well from his hernia surgery.  I advised to put a Band-Aid rather bandage over this right-sided incision.  Does not appear to have any infection and expect this to heal without problem.  If he does notice other concerns with incisions or otherwise he can let me know otherwise we will plan for follow-ups as needed.  Okay for him to increase his activities and lifting as tolerated.    Everette Lerner MD

## 2021-03-30 DIAGNOSIS — E11.42 CONTROLLED TYPE 2 DIABETES MELLITUS WITH DIABETIC POLYNEUROPATHY, WITHOUT LONG-TERM CURRENT USE OF INSULIN (H): ICD-10-CM

## 2021-06-22 ENCOUNTER — MYC MEDICAL ADVICE (OUTPATIENT)
Dept: ORTHOPEDICS | Facility: CLINIC | Age: 66
End: 2021-06-22

## 2021-06-22 DIAGNOSIS — Z96.641 STATUS POST TOTAL REPLACEMENT OF RIGHT HIP: Primary | ICD-10-CM

## 2021-06-23 RX ORDER — CLINDAMYCIN HCL 150 MG
CAPSULE ORAL
Qty: 3 CAPSULE | Refills: 3 | Status: SHIPPED | OUTPATIENT
Start: 2021-06-23 | End: 2022-01-04

## 2021-06-24 DIAGNOSIS — E11.42 CONTROLLED TYPE 2 DIABETES MELLITUS WITH DIABETIC POLYNEUROPATHY, WITHOUT LONG-TERM CURRENT USE OF INSULIN (H): ICD-10-CM

## 2021-06-24 DIAGNOSIS — E78.5 HYPERLIPIDEMIA LDL GOAL <100: ICD-10-CM

## 2021-06-25 RX ORDER — SIMVASTATIN 20 MG
TABLET ORAL
Qty: 90 TABLET | Refills: 1 | Status: SHIPPED | OUTPATIENT
Start: 2021-06-25 | End: 2021-12-23

## 2021-06-25 RX ORDER — GLIMEPIRIDE 4 MG/1
TABLET ORAL
Qty: 90 TABLET | Refills: 1 | Status: SHIPPED | OUTPATIENT
Start: 2021-06-25 | End: 2021-12-23

## 2021-06-25 NOTE — TELEPHONE ENCOUNTER
"Routing refill request to provider for review/approval because:  Labs out of range:  A1C   Labs not current:  A1C, LDL  Daysi Vargas RN on 6/25/2021 at 4:06 PM      Requested Prescriptions   Pending Prescriptions Disp Refills     simvastatin (ZOCOR) 20 MG tablet [Pharmacy Med Name: SIMVASTATIN 20MG TABLETS] 90 tablet 1     Sig: TAKE 1 TABLET(20 MG) BY MOUTH DAILY       Statins Protocol Passed - 6/24/2021  6:01 AM        Passed - LDL on file in past 12 months     Recent Labs   Lab Test 12/18/20  1404   LDL Cannot estimate LDL when triglyceride exceeds 400 mg/dL  72             Passed - No abnormal creatine kinase in past 12 months     No lab results found.             Passed - Recent (12 mo) or future (30 days) visit within the authorizing provider's specialty     Patient has had an office visit with the authorizing provider or a provider within the authorizing providers department within the previous 12 mos or has a future within next 30 days. See \"Patient Info\" tab in inbasket, or \"Choose Columns\" in Meds & Orders section of the refill encounter.              Passed - Medication is active on med list        Passed - Patient is age 18 or older           glimepiride (AMARYL) 4 MG tablet [Pharmacy Med Name: GLIMEPIRIDE 4MG TABLETS] 90 tablet 1     Sig: TAKE 1 TABLET(4 MG) BY MOUTH EVERY MORNING BEFORE BREAKFAST       Sulfonylurea Agents Failed - 6/24/2021  6:01 AM        Failed - Patient has documented A1c within the specified period of time.     If HgbA1C is 8 or greater, it needs to be on file within the past 3 months.  If less than 8, must be on file within the past 6 months.     Recent Labs   Lab Test 12/18/20  1404   A1C 7.2*             Passed - Medication is active on med list        Passed - Patient is age 18 or older        Passed - Patient has a recent creatinine (normal) within the past 12 mos.     Recent Labs   Lab Test 01/11/21  1138   CR 0.88       Ok to refill medication if creatinine is low         " " Passed - Recent (6 mo) or future (30 days) visit within the authorizing provider's specialty     Patient had office visit in the last 6 months or has a visit in the next 30 days with authorizing provider or within the authorizing provider's specialty.  See \"Patient Info\" tab in inbasket, or \"Choose Columns\" in Meds & Orders section of the refill encounter.               Daysi Vargas RN on 6/25/2021 at 4:04 PM    "

## 2021-07-24 ENCOUNTER — HEALTH MAINTENANCE LETTER (OUTPATIENT)
Age: 66
End: 2021-07-24

## 2021-07-26 DIAGNOSIS — E11.42 CONTROLLED TYPE 2 DIABETES MELLITUS WITH DIABETIC POLYNEUROPATHY, WITHOUT LONG-TERM CURRENT USE OF INSULIN (H): ICD-10-CM

## 2021-07-28 RX ORDER — INSULIN GLARGINE 100 [IU]/ML
60 INJECTION, SOLUTION SUBCUTANEOUS AT BEDTIME
Qty: 60 ML | Refills: 0 | Status: SHIPPED | OUTPATIENT
Start: 2021-07-28 | End: 2021-10-22

## 2021-09-18 ENCOUNTER — HEALTH MAINTENANCE LETTER (OUTPATIENT)
Age: 66
End: 2021-09-18

## 2021-09-22 DIAGNOSIS — E11.42 CONTROLLED TYPE 2 DIABETES MELLITUS WITH DIABETIC POLYNEUROPATHY, WITHOUT LONG-TERM CURRENT USE OF INSULIN (H): ICD-10-CM

## 2021-09-24 DIAGNOSIS — E11.42 CONTROLLED TYPE 2 DIABETES MELLITUS WITH DIABETIC POLYNEUROPATHY, WITHOUT LONG-TERM CURRENT USE OF INSULIN (H): ICD-10-CM

## 2021-09-24 NOTE — TELEPHONE ENCOUNTER
"Routing refill request to provider for review/approval because:  Labs out of range:  A1C  Labs not current:  A1C  Patient needs to be seen because it has been more than 6 months since last office visit.      Requested Prescriptions   Pending Prescriptions Disp Refills     metFORMIN (GLUCOPHAGE) 1000 MG tablet [Pharmacy Med Name: METFORMIN 1000MG TABLETS] 180 tablet 1     Sig: TAKE 1 TABLET BY MOUTH TWICE DAILY WITH MEALS       Biguanide Agents Failed - 9/22/2021  6:01 AM        Failed - Patient has documented A1c within the specified period of time.     If HgbA1C is 8 or greater, it needs to be on file within the past 3 months.  If less than 8, must be on file within the past 6 months.     Recent Labs   Lab Test 12/18/20  1404   A1C 7.2*             Failed - Recent (6 mo) or future (30 days) visit within the authorizing provider's specialty     Patient had office visit in the last 6 months or has a visit in the next 30 days with authorizing provider or within the authorizing provider's specialty.  See \"Patient Info\" tab in inbasket, or \"Choose Columns\" in Meds & Orders section of the refill encounter.            Passed - Patient is age 10 or older        Passed - Patient's CR is NOT>1.4 OR Patient's EGFR is NOT<45 within past 12 mos.     Recent Labs   Lab Test 01/11/21  1138   GFRESTIMATED 89   GFRESTBLACK >90       Recent Labs   Lab Test 01/11/21  1138   CR 0.88             Passed - Patient does NOT have a diagnosis of CHF.        Passed - Medication is active on med list           Daysi Vargas RN on 9/24/2021 at 10:35 AM    "

## 2021-09-27 NOTE — TELEPHONE ENCOUNTER
Prescription approved per [facility] Refill Protocol.    Katy Graham RN  [facility]

## 2021-09-27 NOTE — TELEPHONE ENCOUNTER
Please contact pt to schedule a diabetes follow up, then re-route to RN to refill. Thank you.    Katy Graham RN  [facility]

## 2021-10-22 ENCOUNTER — OFFICE VISIT (OUTPATIENT)
Dept: INTERNAL MEDICINE | Facility: CLINIC | Age: 66
End: 2021-10-22
Payer: COMMERCIAL

## 2021-10-22 VITALS
HEART RATE: 81 BPM | SYSTOLIC BLOOD PRESSURE: 135 MMHG | BODY MASS INDEX: 35.51 KG/M2 | DIASTOLIC BLOOD PRESSURE: 72 MMHG | OXYGEN SATURATION: 97 % | TEMPERATURE: 98.1 F | WEIGHT: 254.6 LBS

## 2021-10-22 DIAGNOSIS — Z23 NEED FOR PROPHYLACTIC VACCINATION AND INOCULATION AGAINST INFLUENZA: Primary | ICD-10-CM

## 2021-10-22 DIAGNOSIS — E11.42 CONTROLLED TYPE 2 DIABETES MELLITUS WITH DIABETIC POLYNEUROPATHY, WITHOUT LONG-TERM CURRENT USE OF INSULIN (H): ICD-10-CM

## 2021-10-22 DIAGNOSIS — Z23 HIGH PRIORITY FOR 2019-NCOV VACCINE: ICD-10-CM

## 2021-10-22 DIAGNOSIS — I10 HYPERTENSION GOAL BP (BLOOD PRESSURE) < 140/90: ICD-10-CM

## 2021-10-22 DIAGNOSIS — Z12.11 SPECIAL SCREENING FOR MALIGNANT NEOPLASMS, COLON: ICD-10-CM

## 2021-10-22 DIAGNOSIS — Z23 NEED FOR SHINGLES VACCINE: ICD-10-CM

## 2021-10-22 LAB
CREAT UR-MCNC: 142 MG/DL
HBA1C MFR BLD: 6.8 % (ref 0–5.6)
MICROALBUMIN UR-MCNC: 191 MG/L
MICROALBUMIN/CREAT UR: 134.51 MG/G CR (ref 0–17)

## 2021-10-22 PROCEDURE — 99214 OFFICE O/P EST MOD 30 MIN: CPT | Mod: 25 | Performed by: INTERNAL MEDICINE

## 2021-10-22 PROCEDURE — 36415 COLL VENOUS BLD VENIPUNCTURE: CPT | Performed by: INTERNAL MEDICINE

## 2021-10-22 PROCEDURE — G0008 ADMIN INFLUENZA VIRUS VAC: HCPCS | Performed by: INTERNAL MEDICINE

## 2021-10-22 PROCEDURE — 90662 IIV NO PRSV INCREASED AG IM: CPT | Performed by: INTERNAL MEDICINE

## 2021-10-22 PROCEDURE — 91300 COVID-19,PF,PFIZER (12+ YRS): CPT | Performed by: INTERNAL MEDICINE

## 2021-10-22 PROCEDURE — 0004A COVID-19,PF,PFIZER (12+ YRS): CPT | Performed by: INTERNAL MEDICINE

## 2021-10-22 PROCEDURE — 82043 UR ALBUMIN QUANTITATIVE: CPT | Performed by: INTERNAL MEDICINE

## 2021-10-22 PROCEDURE — 83036 HEMOGLOBIN GLYCOSYLATED A1C: CPT | Performed by: INTERNAL MEDICINE

## 2021-10-22 PROCEDURE — 99207 PR FOOT EXAM NO CHARGE: CPT | Performed by: INTERNAL MEDICINE

## 2021-10-22 RX ORDER — LOSARTAN POTASSIUM 25 MG/1
25 TABLET ORAL DAILY
Qty: 180 TABLET | Refills: 1 | Status: SHIPPED | OUTPATIENT
Start: 2021-10-22 | End: 2022-09-18

## 2021-10-22 RX ORDER — INSULIN GLARGINE 100 [IU]/ML
60 INJECTION, SOLUTION SUBCUTANEOUS AT BEDTIME
Qty: 60 ML | Refills: 0 | Status: SHIPPED | OUTPATIENT
Start: 2021-10-22 | End: 2022-02-24

## 2021-10-22 NOTE — PROGRESS NOTES
Assessment & Plan     (Z23) Need for prophylactic vaccination and inoculation against influenza  (primary encounter diagnosis)  Comment: immunization administered   Plan: INFLUENZA, QUAD, HIGH DOSE, PF, 65YR + (FLUZONE        HD)            (E11.42) Controlled type 2 diabetes mellitus with diabetic polyneuropathy, without long-term current use of insulin (H)  Comment: since he has gone into assisted , he has greatly increased his physical activity in the mornings with a morning jaunt. At times this exercise coupled with checking his blood glucose before breakfast he's started to see some numbers below 70 although not enough to give him hyperadrenergic response pattern . He had a severe low blood glucose readings only once before and these aren't symptomatic like that. Nevertheless this is concerning all in all. I suggested we need to see his hemoglobin a1c  [ diabetes test ] first but assuming this is better now and below 7% I am going to reduce his Basaglar  (insulin glargine injection) some, probably down to 50 units from the current 60   Plan: REVIEW OF HEALTH MAINTENANCE PROTOCOL ORDERS,         HEMOGLOBIN A1C, Albumin Random Urine         Quantitative with Creat Ratio, OPTOMETRY         REFERRAL, FOOT EXAM, insulin glargine (BASAGLAR        KWIKPEN) 100 UNIT/ML pen, metFORMIN         (GLUCOPHAGE) 1000 MG tablet, COVID-19,PF,PFIZER            (Z23) Need for shingles vaccine  Comment: recommended   Plan:     (I10) Hypertension goal BP (blood pressure) < 140/90  Comment: controlled within acceptable limits   Plan: losartan (COZAAR) 25 MG tablet            (Z12.11) Special screening for malignant neoplasms, colon  Comment: as it turns out he's overdue for his colonoscopy   Plan: Adult Gastro Ref - Procedure Only            (Z23) High priority for 2019-nCoV vaccine  Comment: recommended   Plan:      Review of the result(s) of each unique test - labs from today  Prescription drug management  22 minutes spent on  "the date of the encounter doing chart review, history and exam, documentation and further activities per the note    BMI:   Estimated body mass index is 35.51 kg/m  as calculated from the following:    Height as of 1/11/21: 1.803 m (5' 11\").    Weight as of this encounter: 115.5 kg (254 lb 9.6 oz).   Weight management plan: Discussed healthy diet and exercise guidelines      Return in about 6 months (around 4/22/2022).    Cecilio Pro MD  [facility] EL Arguello is a 66 year old who presents for the following health issues   Encounter Diagnoses   Name Primary?     Controlled type 2 diabetes mellitus with diabetic polyneuropathy, without long-term current use of insulin (H)      Need for shingles vaccine      Hypertension goal BP (blood pressure) < 140/90      Special screening for malignant neoplasms, colon      High priority for 2019-nCoV vaccine      Need for prophylactic vaccination and inoculation against influenza Yes     accompanied by himself.   HPI     Diabetes Follow-up    How often are you checking your blood sugar? Two times daily  Blood sugar testing frequency justification:  Frequent hypoglycemia  What time of day are you checking your blood sugars (select all that apply)?  Before and after meals  Have you had any blood sugars above 200?  Yes   Have you had any blood sugars below 70?  Yes     What symptoms do you notice when your blood sugar is low?  Other: clamy    What concerns do you have today about your diabetes? None     Do you have any of these symptoms? (Select all that apply)  Numbness in feet    Have you had a diabetic eye exam in the last 12 months? Yes- Date of last eye exam: November 2020 [place],  Location: uncertain     Gone into alf about a month ago. He's not going to sit at home and have weight gain . He's doing a 2 mile + walking every single morning.    two hour post prandial [ 2 hours after eating ] average is 115,2 below 70. A few above " 150.  He has noticed that after exercise and before breakfast and it's lower, instead of 160, now he's seeing average of 120.    Lab Results   Component Value Date    A1C 7.2 12/18/2020    A1C 6.7 06/30/2020    A1C 8.1 03/25/2020    A1C 6.8 10/14/2019    A1C 7.1 06/20/2019         BP Readings from Last 2 Encounters:   10/22/21 135/72   03/22/21 126/78     Hemoglobin A1C (%)   Date Value   12/18/2020 7.2 (H)   06/30/2020 6.7 (H)     LDL Cholesterol Calculated (mg/dL)   Date Value   12/18/2020     Cannot estimate LDL when triglyceride exceeds 400 mg/dL   06/30/2020 60     LDL Cholesterol Direct (mg/dL)   Date Value   12/18/2020 72             How many servings of fruits and vegetables do you eat daily?  4 or more    On average, how many sweetened beverages do you drink each day (Examples: soda, juice, sweet tea, etc.  Do NOT count diet or artificially sweetened beverages)?   0    How many days per week do you exercise enough to make your heart beat faster? 7    How many minutes a day do you exercise enough to make your heart beat faster? 30 - 60  How many days per week do you miss taking your medication? 1    What makes it hard for you to take your medications?  remembering to take      Review of Systems   Constitutional, HEENT, cardiovascular, pulmonary, gi and gu systems are negative, except as otherwise noted.      Objective    /72 (BP Location: Right arm, Patient Position: Chair, Cuff Size: Adult Large)   Pulse 81   Temp 98.1  F (36.7  C) (Oral)   Wt 115.5 kg (254 lb 9.6 oz)   SpO2 97%   BMI 35.51 kg/m    Body mass index is 35.51 kg/m .  Physical Exam   GENERAL: healthy, alert and no distress  RESP: lungs clear to auscultation - no rales, rhonchi or wheezes  CV: regular rate and rhythm, normal S1 S2, no S3 or S4, no murmur, click or rub, no peripheral edema and peripheral pulses strong  MS: no gross musculoskeletal defects noted, no edema  NEURO: Normal strength and tone, mentation intact and speech  normal  PSYCH: mentation appears normal, affect normal/bright  DIABETIC FOOT EXAM - positive for mild evidence of distal peripheral neuropathy but no ulceration or signs or symptoms  Of infection or arteriolar insufficiency     Orders Placed This Encounter   Procedures     REVIEW OF HEALTH MAINTENANCE PROTOCOL ORDERS     FOOT EXAM     COVID-19,PF,PFIZER     INFLUENZA, QUAD, HIGH DOSE, PF, 65YR + (FLUZONE HD)     HEMOGLOBIN A1C     Albumin Random Urine Quantitative with Creat Ratio     OPTOMETRY REFERRAL     Adult Gastro Ref - Procedure Only

## 2021-10-22 NOTE — LETTER
October 25, 2021      Florian Lawrence  [address] [address]        Dear ,    We are writing to inform you of your test results.    All of these tests are within acceptable limits , things look good !         Resulted Orders   Albumin Random Urine Quantitative with Creat Ratio   Result Value Ref Range    Creatinine Urine mg/dL 142 mg/dL    Albumin Urine mg/L 191 mg/L    Albumin Urine mg/g Cr 134.51 (H) 0.00 - 17.00 mg/g Cr   HEMOGLOBIN A1C   Result Value Ref Range    Hemoglobin A1C 6.8 (H) 0.0 - 5.6 %      Comment:      Normal <5.7%   Prediabetes 5.7-6.4%    Diabetes 6.5% or higher     Note: Adopted from ADA consensus guidelines.       If you have any questions or concerns, please call the clinic at the number listed above.       Sincerely,      Cecilio Pro MD/sherron

## 2021-11-05 ENCOUNTER — TELEPHONE (OUTPATIENT)
Dept: GASTROENTEROLOGY | Facility: CLINIC | Age: 66
End: 2021-11-05

## 2021-11-05 NOTE — TELEPHONE ENCOUNTER
Screening Questions  1. Are you active on mychart?Y    2. What insurance is in the chart? BCBS/MEDICARE    2.  Ordering/Referring Provider: Cecilio Pro MD    3. BMI 34.9    4. Do you have any Lung issues?  N If yes continue:   Do you use daily home oxygen?    Do you have Pulmonary Hypertension?    Do you have SEVERE asthma?     5. Have you had a heart, lung, or liver transplant? N    6. Are you currently on dialysis or have chronic kidney disease? N    7. Have you had a stroke or Transient ischemic attack (TIA) within 6 months? N    8. In the past 6 months, have you had any heart related issues including cardiomyopathy or heart attack? N      If yes, did it require cardiac stenting or other implantable device?      9. Do you have any implantable devices in your body (pacemaker, defib, LVAD)? N    10. Do you take nitroglycerin? If yes, how often? N    11. Are you currently taking any blood thinners?N    12. Are you a diabetic? Y TYPE 2    13. (Females) Are you currently pregnant? N  If yes, how many weeks?      15. Are you taking any prescription pain medications on a routine schedule? N If yes, MAC sedation. ASPRIN    16. Do you have any chemical dependencies such as alcohol, street drugs, or methadone? NIf yes, MAC sedation.    17. Do you have any history of post-traumatic stress syndrome, severe anxiety or history of psychosis? N    18. Do you transfer independently? Y    19.  Do you have any issues with constipation? N    20. Preferred Pharmacy for Pre Prescription [facility]    Scheduling Details    Which Colonoscopy Prep was Sent?: GOLYTELY  Procedure Scheduled: COLON  Provider/Surgeon: LISE  Date of Procedure: 12/10  Location:   Caller (Please ask for phone number if not scheduled by patient): [facility]      Sedation Type: CS  Conscious Sedation- Needs  for 6 hours after the procedure  MAC/General-Needs  for 24 hours after procedure    Pre-op Required at [facility] [place],  Sw[facility] and OR for MAC sedation:   (if yes advise patient they will need a pre-op prior to procedure)      Is patient on blood thinners? -N (If yes- inform patient to follow up with PCP or provider for follow up instructions)     Informed patient they will need an adult  Y  Cannot take any type of public or medical transportation alone    Pre-Procedure Covid test to be completed at [facility] or Externally: 12/06 ANIYA Galdamez Nurse will call to complete assessment Y    Additional comments:

## 2021-11-07 DIAGNOSIS — Z11.59 ENCOUNTER FOR SCREENING FOR OTHER VIRAL DISEASES: ICD-10-CM

## 2021-12-02 RX ORDER — BISACODYL 5 MG/1
20 TABLET, DELAYED RELEASE ORAL SEE ADMIN INSTRUCTIONS
Qty: 4 TABLET | Refills: 0 | Status: SHIPPED | OUTPATIENT
Start: 2021-12-02 | End: 2022-09-21

## 2021-12-02 ASSESSMENT — MIFFLIN-ST. JEOR: SCORE: 1913.44

## 2021-12-03 DIAGNOSIS — E11.42 CONTROLLED TYPE 2 DIABETES MELLITUS WITH DIABETIC POLYNEUROPATHY, WITHOUT LONG-TERM CURRENT USE OF INSULIN (H): Primary | ICD-10-CM

## 2021-12-05 NOTE — TELEPHONE ENCOUNTER
"Routing refill request to provider for review/approval because:  2 prescriptions on med list for test strip one with BID directions and the other with TID directions.      Requested Prescriptions   Pending Prescriptions Disp Refills     CONTOUR NEXT TEST test strip [Pharmacy Med Name: CONTOUR NEXT TEST STRIPS 100S] 300 strip      Sig: TEST THREE TIMES DAILY       Diabetic Supplies Protocol Passed - 12/3/2021  9:03 AM        Passed - Medication is active on med list        Passed - Patient is 18 years of age or older        Passed - Recent (6 mo) or future (30 days) visit within the authorizing provider's specialty     Patient had office visit in the last 6 months or has a visit in the next 30 days with authorizing provider.  See \"Patient Info\" tab in inbasket, or \"Choose Columns\" in Meds & Orders section of the refill encounter.               Dina Ye RN    "

## 2021-12-06 ENCOUNTER — LAB (OUTPATIENT)
Dept: LAB | Facility: CLINIC | Age: 66
End: 2021-12-06
Payer: COMMERCIAL

## 2021-12-06 DIAGNOSIS — Z11.59 ENCOUNTER FOR SCREENING FOR OTHER VIRAL DISEASES: ICD-10-CM

## 2021-12-06 PROCEDURE — U0005 INFEC AGEN DETEC AMPLI PROBE: HCPCS

## 2021-12-06 PROCEDURE — U0003 INFECTIOUS AGENT DETECTION BY NUCLEIC ACID (DNA OR RNA); SEVERE ACUTE RESPIRATORY SYNDROME CORONAVIRUS 2 (SARS-COV-2) (CORONAVIRUS DISEASE [COVID-19]), AMPLIFIED PROBE TECHNIQUE, MAKING USE OF HIGH THROUGHPUT TECHNOLOGIES AS DESCRIBED BY CMS-2020-01-R: HCPCS

## 2021-12-07 LAB — SARS-COV-2 RNA RESP QL NAA+PROBE: NEGATIVE

## 2021-12-10 ENCOUNTER — HOSPITAL ENCOUNTER (OUTPATIENT)
Facility: AMBULATORY SURGERY CENTER | Age: 66
Discharge: HOME | End: 2021-12-10
Attending: FAMILY MEDICINE | Admitting: FAMILY MEDICINE
Payer: COMMERCIAL

## 2021-12-10 VITALS
RESPIRATION RATE: 16 BRPM | TEMPERATURE: 97.9 F | HEIGHT: 71 IN | SYSTOLIC BLOOD PRESSURE: 127 MMHG | OXYGEN SATURATION: 97 % | DIASTOLIC BLOOD PRESSURE: 85 MMHG | WEIGHT: 245 LBS | BODY MASS INDEX: 34.3 KG/M2

## 2021-12-10 DIAGNOSIS — Z12.11 SPECIAL SCREENING FOR MALIGNANT NEOPLASMS, COLON: Primary | ICD-10-CM

## 2021-12-10 LAB
COLONOSCOPY: NORMAL
GLUCOSE BLDC GLUCOMTR-MCNC: 89 MG/DL (ref 70–99)

## 2021-12-10 PROCEDURE — G0105 COLORECTAL SCRN; HI RISK IND: HCPCS

## 2021-12-10 PROCEDURE — 82962 GLUCOSE BLOOD TEST: CPT | Performed by: FAMILY MEDICINE

## 2021-12-10 PROCEDURE — G8907 PT DOC NO EVENTS ON DISCHARG: HCPCS

## 2021-12-10 PROCEDURE — G8918 PT W/O PREOP ORDER IV AB PRO: HCPCS

## 2021-12-10 PROCEDURE — G0121 COLON CA SCRN NOT HI RSK IND: HCPCS | Performed by: FAMILY MEDICINE

## 2021-12-10 PROCEDURE — 99152 MOD SED SAME PHYS/QHP 5/>YRS: CPT | Mod: 59 | Performed by: FAMILY MEDICINE

## 2021-12-10 RX ORDER — FENTANYL CITRATE 50 UG/ML
INJECTION, SOLUTION INTRAMUSCULAR; INTRAVENOUS PRN
Status: DISCONTINUED | OUTPATIENT
Start: 2021-12-10 | End: 2021-12-10 | Stop reason: HOSPADM

## 2021-12-10 RX ORDER — LIDOCAINE 40 MG/G
CREAM TOPICAL
Status: DISCONTINUED | OUTPATIENT
Start: 2021-12-10 | End: 2021-12-11 | Stop reason: HOSPADM

## 2021-12-10 RX ORDER — ONDANSETRON 2 MG/ML
4 INJECTION INTRAMUSCULAR; INTRAVENOUS
Status: DISCONTINUED | OUTPATIENT
Start: 2021-12-10 | End: 2021-12-11 | Stop reason: HOSPADM
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
## 2021-12-21 DIAGNOSIS — E78.5 HYPERLIPIDEMIA LDL GOAL <100: ICD-10-CM

## 2021-12-21 DIAGNOSIS — E11.42 CONTROLLED TYPE 2 DIABETES MELLITUS WITH DIABETIC POLYNEUROPATHY, WITHOUT LONG-TERM CURRENT USE OF INSULIN (H): ICD-10-CM

## 2021-12-23 DIAGNOSIS — E11.42 CONTROLLED TYPE 2 DIABETES MELLITUS WITH DIABETIC POLYNEUROPATHY, WITHOUT LONG-TERM CURRENT USE OF INSULIN (H): ICD-10-CM

## 2021-12-23 RX ORDER — GLIMEPIRIDE 4 MG/1
TABLET ORAL
Qty: 90 TABLET | Refills: 0 | Status: SHIPPED | OUTPATIENT
Start: 2021-12-23 | End: 2022-03-22

## 2021-12-23 NOTE — TELEPHONE ENCOUNTER
"Routing refill request to provider for review/approval because:  Labs not current:  LDL      Requested Prescriptions   Pending Prescriptions Disp Refills     glimepiride (AMARYL) 4 MG tablet [Pharmacy Med Name: GLIMEPIRIDE 4MG TABLETS] 90 tablet 1     Sig: TAKE 1 TABLET(4 MG) BY MOUTH EVERY MORNING BEFORE BREAKFAST       Sulfonylurea Agents Passed - 12/21/2021  5:54 AM        Passed - Patient has documented A1c within the specified period of time.     If HgbA1C is 8 or greater, it needs to be on file within the past 3 months.  If less than 8, must be on file within the past 6 months.     Recent Labs   Lab Test 10/22/21  1148   A1C 6.8*             Passed - Medication is active on med list        Passed - Patient is age 18 or older        Passed - Patient has a recent creatinine (normal) within the past 12 mos.     Recent Labs   Lab Test 01/11/21  1138   CR 0.88       Ok to refill medication if creatinine is low          Passed - Recent (6 mo) or future (30 days) visit within the authorizing provider's specialty     Patient had office visit in the last 6 months or has a visit in the next 30 days with authorizing provider or within the authorizing provider's specialty.  See \"Patient Info\" tab in inbasket, or \"Choose Columns\" in Meds & Orders section of the refill encounter.               simvastatin (ZOCOR) 20 MG tablet [Pharmacy Med Name: SIMVASTATIN 20MG TABLETS] 90 tablet 1     Sig: TAKE 1 TABLET(20 MG) BY MOUTH DAILY       Statins Protocol Failed - 12/21/2021  5:54 AM        Failed - LDL on file in past 12 months     Recent Labs   Lab Test 12/18/20  1404   LDL Cannot estimate LDL when triglyceride exceeds 400 mg/dL  72             Passed - No abnormal creatine kinase in past 12 months     No lab results found.             Passed - Recent (12 mo) or future (30 days) visit within the authorizing provider's specialty     Patient has had an office visit with the authorizing provider or a provider within the " "authorizing providers department within the previous 12 mos or has a future within next 30 days. See \"Patient Info\" tab in inbasket, or \"Choose Columns\" in Meds & Orders section of the refill encounter.              Passed - Medication is active on med list        Passed - Patient is age 18 or older           Daysi Vargas RN on 12/23/2021 at 2:19 PM    "

## 2021-12-24 RX ORDER — SIMVASTATIN 20 MG
TABLET ORAL
Qty: 90 TABLET | Refills: 0 | Status: SHIPPED | OUTPATIENT
Start: 2021-12-24 | End: 2022-03-22

## 2021-12-24 RX ORDER — PEN NEEDLE, DIABETIC 32GX 5/32"
NEEDLE, DISPOSABLE MISCELLANEOUS
Qty: 100 EACH | Refills: 1 | Status: SHIPPED | OUTPATIENT
Start: 2021-12-24 | End: 2022-08-11

## 2022-01-17 ENCOUNTER — OFFICE VISIT (OUTPATIENT)
Dept: OPTOMETRY | Facility: CLINIC | Age: 67
End: 2022-01-17
Payer: COMMERCIAL

## 2022-01-17 DIAGNOSIS — H52.4 PRESBYOPIA: ICD-10-CM

## 2022-01-17 DIAGNOSIS — H25.13 NUCLEAR AGE-RELATED CATARACT, BOTH EYES: ICD-10-CM

## 2022-01-17 DIAGNOSIS — H52.13 MYOPIA OF BOTH EYES: ICD-10-CM

## 2022-01-17 DIAGNOSIS — H52.223 REGULAR ASTIGMATISM OF BOTH EYES: ICD-10-CM

## 2022-01-17 DIAGNOSIS — Z01.01 ENCOUNTER FOR EXAMINATION OF EYES AND VISION WITH ABNORMAL FINDINGS: Primary | ICD-10-CM

## 2022-01-17 DIAGNOSIS — E11.9 TYPE 2 DIABETES MELLITUS WITHOUT RETINOPATHY (H): ICD-10-CM

## 2022-01-17 PROBLEM — Z96.649 S/P TOTAL HIP ARTHROPLASTY: Status: ACTIVE | Noted: 2019-10-28

## 2022-01-17 PROCEDURE — 92015 DETERMINE REFRACTIVE STATE: CPT | Performed by: OPTOMETRIST

## 2022-01-17 PROCEDURE — 92014 COMPRE OPH EXAM EST PT 1/>: CPT | Performed by: OPTOMETRIST

## 2022-01-17 RX ORDER — LANCETS
EACH MISCELLANEOUS
COMMUNITY
Start: 2021-06-03

## 2022-01-17 RX ORDER — ALBUTEROL SULFATE 90 UG/1
2 AEROSOL, METERED RESPIRATORY (INHALATION) EVERY 6 HOURS
COMMUNITY
Start: 2020-05-15 | End: 2022-12-13

## 2022-01-17 RX ORDER — MULTIPLE VITAMINS W/ MINERALS TAB 9MG-400MCG
1 TAB ORAL DAILY
COMMUNITY

## 2022-01-17 ASSESSMENT — VISUAL ACUITY
OD_CC+: -3
OD_SC: 20/20-2
OD_CC: 20/40-1
CORRECTION_TYPE: GLASSES
OS_CC: 20/60-1
OD_CC: 20/20
OS_SC: 20/20
OD_SC: 20/300
OS_CC+: -1
OS_CC: 20/20
OS_SC: 20/300
METHOD: SNELLEN - LINEAR

## 2022-01-17 ASSESSMENT — REFRACTION_WEARINGRX
SPECS_TYPE: PAL
OD_ADD: +2.75
OD_CYLINDER: +1.00
OD_AXIS: 068
OS_SPHERE: -5.25
OS_AXIS: 124
OD_SPHERE: -4.75
OS_ADD: +2.75
OS_CYLINDER: +0.75

## 2022-01-17 ASSESSMENT — REFRACTION_MANIFEST
OD_ADD: +2.75
OS_CYLINDER: +0.75
OD_CYLINDER: +1.00
OD_AXIS: 064
OD_SPHERE: -4.75
OS_SPHERE: -4.75
OS_AXIS: 131
OS_ADD: +2.75

## 2022-01-17 ASSESSMENT — CONF VISUAL FIELD
OS_NORMAL: 1
METHOD: COUNTING FINGERS
OD_NORMAL: 1

## 2022-01-17 ASSESSMENT — TONOMETRY
OS_IOP_MMHG: 13
IOP_METHOD: APPLANATION
OD_IOP_MMHG: 13

## 2022-01-17 ASSESSMENT — EXTERNAL EXAM - LEFT EYE: OS_EXAM: NORMAL

## 2022-01-17 ASSESSMENT — EXTERNAL EXAM - RIGHT EYE: OD_EXAM: NORMAL

## 2022-01-17 ASSESSMENT — SLIT LAMP EXAM - LIDS
COMMENTS: 1+ MEIBOMIAN GLAND DYSFUNCTION
COMMENTS: 1+ MEIBOMIAN GLAND DYSFUNCTION

## 2022-01-17 ASSESSMENT — CUP TO DISC RATIO
OS_RATIO: 0.3
OD_RATIO: 0.4

## 2022-01-17 NOTE — PROGRESS NOTES
Chief Complaint   Patient presents with     Diabetic Eye Exam        Chief Complaint(s) and History of Present Illness(es)     Diabetic Eye Exam     Diabetes Type: Type 2, on insulin and taking oral medications    Duration: 23 years               Hemoglobin A1C POCT   Date Value Ref Range Status   12/18/2020 7.2 (H) 0 - 5.6 % Final     Comment:     Normal <5.7% Prediabetes 5.7-6.4%  Diabetes 6.5% or higher - adopted from ADA   consensus guidelines.     06/30/2020 6.7 (H) 0 - 5.6 % Final     Comment:     Normal <5.7% Prediabetes 5.7-6.4%  Diabetes 6.5% or higher - adopted from ADA   consensus guidelines.     03/25/2020 8.1 (H) 0 - 5.6 % Final     Comment:     Normal <5.7% Prediabetes 5.7-6.4%  Diabetes 6.5% or higher - adopted from ADA   consensus guidelines.       Hemoglobin A1C   Date Value Ref Range Status   10/22/2021 6.8 (H) 0.0 - 5.6 % Final     Comment:     Normal <5.7%   Prediabetes 5.7-6.4%    Diabetes 6.5% or higher     Note: Adopted from ADA consensus guidelines.       No CL fitting today - has enough       Last Eye Exam: 11/3/2020  Dilated Previously: Yes, side effects of dilation explained today    What are you currently using to see?  Glasses and contacts - wears about 50/50    Distance Vision Acuity: Satisfied with vision    Near Vision Acuity: Not satisfied - after a long time on computer, his vision gets blurry and slightly doubled. Thinks it may be blood sugars.     Eye Comfort: good  Do you use eye drops? : No  Occupation or Hobbies: Retired  [employer]     Medical, surgical and family histories reviewed and updated 1/17/2022.       OBJECTIVE: See Ophthalmology exam    ASSESSMENT:    ICD-10-CM    1. Encounter for examination of eyes and vision with abnormal findings  Z01.01    2. Type 2 diabetes mellitus without retinopathy (H)  E11.9    3. Nuclear age-related cataract, both eyes  H25.13    4. Myopia of both eyes  H52.13    5. Regular astigmatism of both eyes  H52.223    6. Presbyopia  H52.4        PLAN:    Florian Lawrence aware  eye exam results will be sent to Cecilio Pro.  Patient Instructions   Patient educated on importance of good blood sugar control.  Letter sent to primary care provider with diabetic eye exam report.     You have the start of mild cataracts.  You may notice some blurred vision or glare with night driving.  It is important that you wear good sunglasses to protect your eyes from the ultraviolet light from the sun.     Updated glasses prescription provided today.   Optional to fill, minimal change.     Continue with current contact lens prescription. Valid until 11/4/2022.     Return in 1 year for a comprehensive eye exam, or sooner if needed.     The effects of the dilating drops last for 4- 6 hours.  You will be more sensitive to light and vision will be blurry up close.  Mydriatic sunglasses were given if needed.    Garry Nelson, HEVER  [facility]  [address]. [place]  [id number]    [phone]

## 2022-01-17 NOTE — PATIENT INSTRUCTIONS
Patient educated on importance of good blood sugar control.  Letter sent to primary care provider with diabetic eye exam report.     You have the start of mild cataracts.  You may notice some blurred vision or glare with night driving.  It is important that you wear good sunglasses to protect your eyes from the ultraviolet light from the sun.     Updated glasses prescription provided today.   Optional to fill, minimal change.     Continue with current contact lens prescription. Valid until 11/4/2022.     Return in 1 year for a comprehensive eye exam, or sooner if needed.     The effects of the dilating drops last for 4- 6 hours.  You will be more sensitive to light and vision will be blurry up close.  Mydriatic sunglasses were given if needed.    Garry Nelson, OD  [facility]  [address] [place] [address]. [place]  [id number]    [phone]

## 2022-01-17 NOTE — LETTER
1/17/2022         RE: Florian Lawrence  [facility] [facility] [address]        Dear Colleague,    Thank you for referring your patient, Florian Lawrence, to the [facility]. Please see a copy of my visit note below.    Chief Complaint   Patient presents with     Diabetic Eye Exam        Chief Complaint(s) and History of Present Illness(es)     Diabetic Eye Exam     Diabetes Type: Type 2, on insulin and taking oral medications    Duration: 23 years               Hemoglobin A1C POCT   Date Value Ref Range Status   12/18/2020 7.2 (H) 0 - 5.6 % Final     Comment:     Normal <5.7% Prediabetes 5.7-6.4%  Diabetes 6.5% or higher - adopted from ADA   consensus guidelines.     06/30/2020 6.7 (H) 0 - 5.6 % Final     Comment:     Normal <5.7% Prediabetes 5.7-6.4%  Diabetes 6.5% or higher - adopted from ADA   consensus guidelines.     03/25/2020 8.1 (H) 0 - 5.6 % Final     Comment:     Normal <5.7% Prediabetes 5.7-6.4%  Diabetes 6.5% or higher - adopted from ADA   consensus guidelines.       Hemoglobin A1C   Date Value Ref Range Status   10/22/2021 6.8 (H) 0.0 - 5.6 % Final     Comment:     Normal <5.7%   Prediabetes 5.7-6.4%    Diabetes 6.5% or higher     Note: Adopted from ADA consensus guidelines.       No CL fitting today - has enough       Last Eye Exam: 11/3/2020  Dilated Previously: Yes, side effects of dilation explained today    What are you currently using to see?  Glasses and contacts - wears about 50/50    Distance Vision Acuity: Satisfied with vision    Near Vision Acuity: Not satisfied - after a long time on computer, his vision gets blurry and slightly doubled. Thinks it may be blood sugars.     Eye Comfort: good  Do you use eye drops? : No  Occupation or Hobbies: Retired  [employer]indy Holen     Medical, surgical and family histories reviewed and updated 1/17/2022.       OBJECTIVE: See Ophthalmology exam    ASSESSMENT:    ICD-10-CM    1. Encounter for examination of eyes and vision  with abnormal findings  Z01.01    2. Type 2 diabetes mellitus without retinopathy (H)  E11.9    3. Nuclear age-related cataract, both eyes  H25.13    4. Myopia of both eyes  H52.13    5. Regular astigmatism of both eyes  H52.223    6. Presbyopia  H52.4       PLAN:    Florian Lawrence aware  eye exam results will be sent to Cecilio Pro.  Patient Instructions   Patient educated on importance of good blood sugar control.  Letter sent to primary care provider with diabetic eye exam report.     You have the start of mild cataracts.  You may notice some blurred vision or glare with night driving.  It is important that you wear good sunglasses to protect your eyes from the ultraviolet light from the sun.     Updated glasses prescription provided today.   Optional to fill, minimal change.     Continue with current contact lens prescription. Valid until 11/4/2022.     Return in 1 year for a comprehensive eye exam, or sooner if needed.     The effects of the dilating drops last for 4- 6 hours.  You will be more sensitive to light and vision will be blurry up close.  Mydriatic sunglasses were given if needed.    Garry Nelson OD  [facility]  [address] [place] [address]. [place]  [address] [phone]               Again, thank you for allowing me to participate in the care of your patient.        Sincerely,        Garry Nelson OD

## 2022-02-14 ENCOUNTER — OFFICE VISIT (OUTPATIENT)
Dept: INTERNAL MEDICINE | Facility: CLINIC | Age: 67
End: 2022-02-14
Payer: COMMERCIAL

## 2022-02-14 VITALS
BODY MASS INDEX: 35.9 KG/M2 | TEMPERATURE: 98 F | DIASTOLIC BLOOD PRESSURE: 74 MMHG | OXYGEN SATURATION: 96 % | HEART RATE: 79 BPM | RESPIRATION RATE: 14 BRPM | SYSTOLIC BLOOD PRESSURE: 120 MMHG | WEIGHT: 257.4 LBS

## 2022-02-14 DIAGNOSIS — H81.10 BENIGN PAROXYSMAL POSITIONAL VERTIGO, UNSPECIFIED LATERALITY: ICD-10-CM

## 2022-02-14 DIAGNOSIS — L98.9 SKIN LESION: Primary | ICD-10-CM

## 2022-02-14 DIAGNOSIS — E78.5 HYPERLIPIDEMIA LDL GOAL <100: ICD-10-CM

## 2022-02-14 DIAGNOSIS — E11.42 CONTROLLED TYPE 2 DIABETES MELLITUS WITH DIABETIC POLYNEUROPATHY, WITHOUT LONG-TERM CURRENT USE OF INSULIN (H): ICD-10-CM

## 2022-02-14 DIAGNOSIS — M65.30 TRIGGER FINGER, ACQUIRED: ICD-10-CM

## 2022-02-14 LAB — HBA1C MFR BLD: 6.7 % (ref 0–5.6)

## 2022-02-14 PROCEDURE — 83721 ASSAY OF BLOOD LIPOPROTEIN: CPT | Mod: 59 | Performed by: INTERNAL MEDICINE

## 2022-02-14 PROCEDURE — 80061 LIPID PANEL: CPT | Performed by: INTERNAL MEDICINE

## 2022-02-14 PROCEDURE — 99214 OFFICE O/P EST MOD 30 MIN: CPT | Performed by: INTERNAL MEDICINE

## 2022-02-14 PROCEDURE — 83036 HEMOGLOBIN GLYCOSYLATED A1C: CPT | Performed by: INTERNAL MEDICINE

## 2022-02-14 PROCEDURE — 36415 COLL VENOUS BLD VENIPUNCTURE: CPT | Performed by: INTERNAL MEDICINE

## 2022-02-14 PROCEDURE — 80048 BASIC METABOLIC PNL TOTAL CA: CPT | Performed by: INTERNAL MEDICINE

## 2022-02-14 NOTE — PROGRESS NOTES
Assessment & Plan     Skin lesion  Patient presented with a bunch of smaller issues today. First of all he's had the development of a skin lesion. This is at the left axilla and this is what looks like almost certainly a peeled off seborrheic keratosis with some mild underlying skin inflammation . It's a small area of nothing more then 4-5 millimeters around and has one small skin tear / spot of bleeding where skin looks traumatized. We discussed what to be on the watch for  , the abcd guide to mole-watching was reviewed  . This doesn't meet any threshold for worry. Keep an eye on things. Update me if significant changes have taken place for dermatologist consultation      Trigger finger, acquired  Second issue is a snapping phenomenon associated with ring finger on right hand. This problem actually has improved now on it's own and he's not sure he needs to do anything with this. We reviewed the pathophysiology and we discussed what to be on the watch for . As a general rule of thumb this is probably going to come back and I recommended seeing an orthopedist surgeon for consideration of a possible steroid injection   - Orthopedic  Referral; Future    Benign paroxysmal positional vertigo, unspecified laterality  We reviewed his benign and brief residual symptoms of vertigo. He has benign positional vertigo. This is a harmless phenomenon. We discussed what to be on the watch for  , and update me if significant changes have taken place , keep an eye on things     Hyperlipidemia LDL goal <100  Due for laboratory studies today , fasting lipid panel   - Lipid panel reflex to direct LDL Fasting; Future  - Lipid panel reflex to direct LDL Fasting    Controlled type 2 diabetes mellitus with diabetic polyneuropathy, without long-term current use of insulin (H)  Due for recheck   - BASIC METABOLIC PANEL; Future  - Hemoglobin A1c; Future  - BASIC METABOLIC PANEL  - Hemoglobin A1c    Review of the result(s) of each  unique test - todays labs  Prescription drug management  22 minutes spent on the date of the encounter doing chart review, history and exam, documentation and further activities per the note        No follow-ups on file.    Cecilio Pro MD  [facility] EL Arguello is a 66 year old who presents for the following health issues   Encounter Diagnoses   Name Primary?     Skin lesion Yes     Trigger finger, acquired      Benign paroxysmal positional vertigo, unspecified laterality      Hyperlipidemia LDL goal <100      Controlled type 2 diabetes mellitus with diabetic polyneuropathy, without long-term current use of insulin (H)         HPI   Chief Complaint   Patient presents with     Light/dark Spots     underneath Lt arm      Arthritis     painn in Lt pinky finger, x1 month painful and joint locks up      Dizziness     x1 month, only when getting out of bed and laying down for bed getting dizzy, feeling alot better just walking it off      Dizziness symptoms only with rising or laying down to sleep  Been almost a month.    Benign positional vertigo symptoms lingering     Due for recheck of fasting lipid panel and basic metabolic panel today , also will go ahead and recheck his hemoglobin a1c  [ diabetes test ] with plan of recheck / follow up in 6 months from today      Review of Systems   Constitutional, HEENT, cardiovascular, pulmonary, gi and gu systems are negative, except as otherwise noted.      Objective    /74   Pulse 79   Temp 98  F (36.7  C) (Oral)   Resp 14   Wt 116.8 kg (257 lb 6.4 oz)   SpO2 96%   BMI 35.90 kg/m    Body mass index is 35.9 kg/m .  Physical Exam   GENERAL: healthy, alert and no distress  RESP: lungs clear to auscultation - no rales, rhonchi or wheezes  CV: regular rate and rhythm, normal S1 S2, no S3 or S4, no murmur, click or rub, no peripheral edema and peripheral pulses strong  ABDOMEN: soft, nontender, no hepatosplenomegaly, no masses and bowel  sounds normal  MS: no gross musculoskeletal defects noted, no edema  SKIN: no suspicious lesions or rashes

## 2022-02-14 NOTE — LETTER
February 16, 2022      Jos Lawrence  [address] [facility] [address]        Dear ,    We are writing to inform you of your test results.    All of these tests are within acceptable limits , things look good !         Resulted Orders   Lipid panel reflex to direct LDL Fasting   Result Value Ref Range    Cholesterol 147 <200 mg/dL    Triglycerides 433 (H) <150 mg/dL    Direct Measure HDL 28 (L) >=40 mg/dL    LDL Cholesterol Calculated        Comment:      Cannot estimate LDL when triglyceride exceeds 400 mg/dL    Non HDL Cholesterol 119 <130 mg/dL    Patient Fasting > 8hrs? No     Narrative    Cholesterol  Desirable:  <200 mg/dL    Triglycerides  Normal:  Less than 150 mg/dL  Borderline High:  150-199 mg/dL  High:  200-499 mg/dL  Very High:  Greater than or equal to 500 mg/dL    Direct Measure HDL  Female:  Greater than or equal to 50 mg/dL   Male:  Greater than or equal to 40 mg/dL    LDL Cholesterol  Desirable:  <100mg/dL  Above Desirable:  100-129 mg/dL   Borderline High:  130-159 mg/dL   High:  160-189 mg/dL   Very High:  >= 190 mg/dL    Non HDL Cholesterol  Desirable:  130 mg/dL  Above Desirable:  130-159 mg/dL  Borderline High:  160-189 mg/dL  High:  190-219 mg/dL  Very High:  Greater than or equal to 220 mg/dL   BASIC METABOLIC PANEL   Result Value Ref Range    Sodium 136 133 - 144 mmol/L    Potassium 4.3 3.4 - 5.3 mmol/L    Chloride 107 94 - 109 mmol/L    Carbon Dioxide (CO2) 25 20 - 32 mmol/L    Anion Gap 4 3 - 14 mmol/L    Urea Nitrogen 21 7 - 30 mg/dL    Creatinine 0.90 0.66 - 1.25 mg/dL    Calcium 9.4 8.5 - 10.1 mg/dL    Glucose 160 (H) 70 - 99 mg/dL    GFR Estimate >90 >60 mL/min/1.73m2      Comment:      Effective December 21, 2021 eGFRcr in adults is calculated using the 2021 CKD-EPI creatinine equation which includes age and gender (Viola babb al., NEJM, DOI: 10.1056/LCIGwo9654251)   Hemoglobin A1c   Result Value Ref Range    Hemoglobin A1C 6.7 (H) 0.0 - 5.6 %      Comment:       Normal <5.7%   Prediabetes 5.7-6.4%    Diabetes 6.5% or higher     Note: Adopted from ADA consensus guidelines.   LDL cholesterol direct   Result Value Ref Range    LDL Cholesterol Direct 65 <100 mg/dL      Comment:      Age 0-19 years:  Desirable: 0-110 mg/dL   Borderline high: 110-129 mg/dL   High: >= 130 mg/dL    Age 20 years and older:  Desirable: <100mg/dL  Above desirable: 100-129 mg/dL   Borderline high: 130-159 mg/dL   High: 160-189 mg/dL   Very high: >= 190 mg/dL       If you have any questions or concerns, please call the clinic at the number listed above.       Sincerely,      Cecilio Pro MD/sherron

## 2022-02-15 LAB
ANION GAP SERPL CALCULATED.3IONS-SCNC: 4 MMOL/L (ref 3–14)
BUN SERPL-MCNC: 21 MG/DL (ref 7–30)
CALCIUM SERPL-MCNC: 9.4 MG/DL (ref 8.5–10.1)
CHLORIDE BLD-SCNC: 107 MMOL/L (ref 94–109)
CHOLEST SERPL-MCNC: 147 MG/DL
CO2 SERPL-SCNC: 25 MMOL/L (ref 20–32)
CREAT SERPL-MCNC: 0.9 MG/DL (ref 0.66–1.25)
FASTING STATUS PATIENT QL REPORTED: NO
GFR SERPL CREATININE-BSD FRML MDRD: >90 ML/MIN/1.73M2
GLUCOSE BLD-MCNC: 160 MG/DL (ref 70–99)
HDLC SERPL-MCNC: 28 MG/DL
LDLC SERPL CALC-MCNC: 65 MG/DL
LDLC SERPL CALC-MCNC: ABNORMAL MG/DL
NONHDLC SERPL-MCNC: 119 MG/DL
POTASSIUM BLD-SCNC: 4.3 MMOL/L (ref 3.4–5.3)
SODIUM SERPL-SCNC: 136 MMOL/L (ref 133–144)
TRIGL SERPL-MCNC: 433 MG/DL

## 2022-02-16 ENCOUNTER — OFFICE VISIT (OUTPATIENT)
Dept: ORTHOPEDICS | Facility: CLINIC | Age: 67
End: 2022-02-16
Attending: INTERNAL MEDICINE
Payer: COMMERCIAL

## 2022-02-16 VITALS
HEART RATE: 70 BPM | HEIGHT: 72 IN | WEIGHT: 256 LBS | DIASTOLIC BLOOD PRESSURE: 81 MMHG | BODY MASS INDEX: 34.67 KG/M2 | SYSTOLIC BLOOD PRESSURE: 155 MMHG

## 2022-02-16 DIAGNOSIS — M65.352 TRIGGER FINGER, LEFT LITTLE FINGER: ICD-10-CM

## 2022-02-16 PROCEDURE — 99213 OFFICE O/P EST LOW 20 MIN: CPT | Performed by: ORTHOPAEDIC SURGERY

## 2022-02-16 ASSESSMENT — PAIN SCALES - GENERAL: PAINLEVEL: MILD PAIN (2)

## 2022-02-16 NOTE — LETTER
"    2/16/2022         RE: Florian Lawrence  [facility] [facility] [address]        Dear Colleague,    Thank you for referring your patient, Florian Lawrence, to the [facility]. Please see a copy of my visit note below.    CHIEF COMPLAINT:   Chief Complaint   Patient presents with     Consult     Left small finger.  Was seen in clinic by Dr. Pro on 12/14/22 and was told he had a trigger finger.   Started about 6 weeks ago Finger gets \"caught\" in one position and than snaps out of that position.     Florian Lawrence is seen today in the [facility] for evaluation of right ring trigger finger at the request of Dr. Cecilio Pro         HISTORY:  Florian Lawrence is a 66 year old male , Right -hand dominant who is seen in consultation at the request of Cecilio Pro,for left  hand pain and catching x ~6 weeks.  The symptoms have been episodic, and improving since onset. No treatments. Pain in the palm base of the small finger. Was locking all the time, now not as much.     he has numbness and tingling in none of the digits.      Reports left long finger fracture in the past.    Retired 9/2021, .     Jos is diabetic, A1C=6.7 on 2/14/2022      Suspected cause: Due to unknown factors.    Pain severity: 2/10  Pain quality: dull, aching and sharp  Frequency of symptoms: occasionally.  Usual level of recreational activity: sedentary  Usual level of work activity: retired.      Other PMH:  has a past medical history of Degenerative joint disease, Diabetes (H) (9/1/1999), Eczema, GERD (gastroesophageal reflux disease), Hyperlipidemia LDL goal <100 (10/22/2010), Hypertension goal BP (blood pressure) < 140/90 (10/22/2010), Obesity, and Screening colonoscopy (02/06/2006).    He has no past medical history of Bleeding disorder (H), Cancer (H), Cerebral infarction (H), Congestive heart failure (H), Congestive heart failure, unspecified, COPD " (chronic obstructive pulmonary disease) (H), Coronary artery disease, Depressive disorder, Glaucoma, Heart disease, History of blood transfusion, Inflammatory arthritis, Kidney disease, Macular degeneration, Nonsenile cataract, Retinal detachment, Strabismus, Stroke (H), Surgical complications, Thyroid disease, Uncomplicated asthma, Unspecified asthma(493.90), or Uveitis.  Patient Active Problem List   Diagnosis     obesity     Hyperlipidemia LDL goal <100     Hypertension goal BP (blood pressure) < 140/90     Fracture of humerus, proximal, right, closed     Advanced directives, counseling/discussion     Hypertriglyceridemia     HDL lipoprotein deficiency     Controlled type 2 diabetes mellitus with diabetic polyneuropathy, without long-term current use of insulin (H)     Herpes zoster ophthalmicus     Eczema, unspecified type     Gastroesophageal reflux disease, esophagitis presence not specified     S/P total hip arthroplasty       Surgical Hx:  has a past surgical history that includes closed rx humeral supracondylar fx (2011); NASAL SURG PROC UNLISTED (1980's); Colonoscopy with CO2 insufflation (N/A, 12/8/2017); fracture tx, hip rt/lt (6/4/99); PELVIS/HIP JOINT SURGERY UNLISTED (2019); hernia repair; colonoscopy (12/10/2021); and Colonoscopy with CO2 insufflation (N/A, 12/10/2021).    Medications:   Current Outpatient Medications:      albuterol (PROAIR HFA/PROVENTIL HFA/VENTOLIN HFA) 108 (90 Base) MCG/ACT inhaler, Inhale 2 puffs into the lungs every 6 hours, Disp: , Rfl:      aspirin 325 MG EC tablet, Take 1 tablet by mouth daily., Disp: 100 tablet, Rfl: 1     BD PEN NEEDLE JENNIFER 2ND GEN 32G X 4 MM miscellaneous, USE AS DIRECTED ONCE DAILY, Disp: 100 each, Rfl: 1     bisacodyl (DULCOLAX) 5 MG EC tablet, Take 4 tablets (20 mg) by mouth See Admin Instructions Follow his my chart instructions for colon prep, Disp: 4 tablet, Rfl: 0     blood glucose (NO BRAND SPECIFIED) test strip, Use to test blood sugar 3 times  daily or as directed. To accompany: Blood Glucose Monitor Brands: per insurance., Disp: 100 strip, Rfl: 6     blood glucose (ONETOUCH ULTRA) test strip, TEST TWICE DAILY OR AS DIRECTED, Disp: 200 strip, Rfl: 2     blood glucose calibration (NO BRAND SPECIFIED) solution, To accompany: Blood Glucose Monitor Brands: per insurance., Disp: 1 Bottle, Rfl: 3     blood glucose monitoring (NO BRAND SPECIFIED) meter device kit, Use to test blood sugar 3 times daily or as directed. Preferred blood glucose meter OR supplies to accompany: Blood Glucose Monitor Brands: per insurance., Disp: 1 kit, Rfl: 0     cholecalciferol (VITAMIN D) 400 UNIT TABS, Take 400 Units by mouth 2 times daily., Disp: , Rfl:      clindamycin (CLEOCIN) 150 MG capsule, Take 450mg ([id number]) clindamycin capsules 1 hour prior to dental procedures., Disp: 3 capsule, Rfl: 3     clobetasol (TEMOVATE) 0.05 % cream, Apply two times a day to affected areas until cleared, Disp: 45 g, Rfl: 3     CONTOUR NEXT TEST test strip, TEST THREE TIMES DAILY, Disp: 300 strip, Rfl: 3     glimepiride (AMARYL) 4 MG tablet, TAKE 1 TABLET(4 MG) BY MOUTH EVERY MORNING BEFORE BREAKFAST, Disp: 90 tablet, Rfl: 0     ibuprofen (ADVIL,MOTRIN) 200 MG tablet, Take 200 mg by mouth every 4 hours as needed, Disp: , Rfl:      insulin glargine (BASAGLAR KWIKPEN) 100 UNIT/ML pen, Inject 60 Units Subcutaneous At Bedtime, Disp: 60 mL, Rfl: 0     losartan (COZAAR) 25 MG tablet, Take 1 tablet (25 mg) by mouth daily, Disp: 180 tablet, Rfl: 1     metFORMIN (GLUCOPHAGE) 1000 MG tablet, TAKE 1 TABLET BY MOUTH TWICE DAILY WITH MEALS, Disp: 180 tablet, Rfl: 1     Microlet Lancets MISC, TEST THREE TIMES DAILY, Disp: , Rfl:      MULTIPLE VITAMIN PO, Take 1 tablet by mouth 2 times daily., Disp: , Rfl:      multivitamin w/minerals (MULTIVITAMIN, THERAPEUTIC WITH MINERALS) tablet, Take 1 tablet by mouth daily, Disp: , Rfl:      order for DME, Equipment being ordered: glucostrips for the  one touch ultra 2  "glucometer 2 times a day, Disp: 3 Box, Rfl: 3     polyethylene glycol (GOLYTELY) 236 g suspension, Take 4,000 mLs by mouth See Admin Instructions Follow his my chart instructions for colon prep., Disp: 4000 mL, Rfl: 0     sildenafil (VIAGRA) 100 MG tablet, Take 1 tablet (100 mg) by mouth daily as needed Take 30 min to 4 hours before intercourse.  Never use with nitroglycerin, terazosin or doxazosin., Disp: 6 tablet, Rfl: 1     simvastatin (ZOCOR) 20 MG tablet, TAKE 1 TABLET(20 MG) BY MOUTH DAILY, Disp: 90 tablet, Rfl: 0    Allergies:   Allergies   Allergen Reactions     Lisinopril Swelling and Other (See Comments)     Facial swelling     Penicillins Anaphylaxis       Social Hx: retired .   reports that he quit smoking about 32 years ago. His smoking use included cigarettes. He started smoking about 47 years ago. He smoked 0.00 packs per day for 15.00 years. He has never used smokeless tobacco. He reports current alcohol use. He reports that he does not use drugs.    Family Hx: family history includes Arthritis in his mother; Cardiovascular in his brother, father, mother, and paternal grandfather; Cerebrovascular Disease in his father; Diabetes in his father and mother; Eye Disorder in his father; Heart Disease in his brother, father, mother, and paternal grandfather; Hypertension in his brother..     REVIEW OF SYSTEMS: 10 point ROS neg other than the symptoms noted above in the HPI and PMH. Notables include  CONSTITUTIONAL:NEGATIVE for fever, chills, change in weight  INTEGUMENTARY/SKIN: NEGATIVE for worrisome rashes, moles or lesions  MUSCULOSKELETAL:See HPI above  Neurology: see HPI above.      EXAM:  BP (!) 155/81   Pulse 70   Ht 1.822 m (5' 11.75\")   Wt 116.1 kg (256 lb)   BMI 34.96 kg/m      GENERAL APPEARANCE: healthy, alert and no distress   GAIT: NORMAL  SKIN: no suspicious lesions or rashes  RESPIRATORY: No increased work of breathing.  NEURO:  strength: decreased, Sensation " intact in all digits  PSYCH:  mentation appears normal and affect normal, not anxious.    MUSCULOSKELETAL:    RIGHT HAND/FINGERS:   Skin intact. Normal wear pattern, color and tone.   No clubbing , mild nail pitting  Range of Motion All Normal    Good capillary refill to all fingers. 2+ radial pulse.    Triggering noted: none  Tenderness over A1 pulley of none of the  fingers.    Intact EPL, FPL, FDP, EDC, wrist flexion/extension, biceps/triceps  Intact sensation to light touch in median, radial and ulnar nerve distributions.      LEFT HAND/FINGERS:   Skin intact. Normal wear pattern, color and tone.   No clubbing, mild nail pitting.  Range of Motion All Normal    Good capillary refill to all fingers. 2+ radial pulse.    Triggering noted: crepitus of the small finger without gross locking  Tenderness over A1 pulley of left small finger.    Likely early dupuytrens cords in the palm, notably long finger    Intact EPL, FPL, FDP, EDC, wrist flexion/extension, biceps/triceps  Intact sensation to light touch in median, radial and ulnar nerve distributions.      XRAYS: none indicated.      ASSESSMENT: 65yo RHD male with left small trigger finger.        PLAN:     We talked about the options: taping the PIP, splinting the PIP joint, corticosteroid injections, hand therapy and surgery. I explained the risks and benefits of each, including recurrence. I have explained the nature of the surgical procedure, the risks and recovery time with the patient.  * At this time, patient would like to proceed with: oval-8 finger splint, since getting better since onset.  * an oval-8 finger splint was provided  * Return to clinic as needed. Consider injection or surgery in future as needed.    Freedom Osborne M.D., M.S.  Dept. of Orthopaedic Surgery  [facility]        Again, thank you for allowing me to participate in the care of your patient.        Sincerely,        Freedom Osborne MD

## 2022-02-16 NOTE — PROGRESS NOTES
"CHIEF COMPLAINT:   Chief Complaint   Patient presents with     Consult     Left small finger.  Was seen in clinic by Dr. Pro on 12/14/22 and was told he had a trigger finger.   Started about 6 weeks ago Finger gets \"caught\" in one position and than snaps out of that position.     Florian Lawrence is seen today in the [facility] for evaluation of right ring trigger finger at the request of Dr. Cecilio Pro         HISTORY:  Florian Lawrence is a 66 year old male , Right -hand dominant who is seen in consultation at the request of Cecilio Pro,for left  hand pain and catching x ~6 weeks.  The symptoms have been episodic, and improving since onset. No treatments. Pain in the palm base of the small finger. Was locking all the time, now not as much.     he has numbness and tingling in none of the digits.      Reports left long finger fracture in the past.    Retired 9/2021, .     Jos is diabetic, A1C=6.7 on 2/14/2022      Suspected cause: Due to unknown factors.    Pain severity: 2/10  Pain quality: dull, aching and sharp  Frequency of symptoms: occasionally.  Usual level of recreational activity: sedentary  Usual level of work activity: retired.      Other PMH:  has a past medical history of Degenerative joint disease, Diabetes (H) (9/1/1999), Eczema, GERD (gastroesophageal reflux disease), Hyperlipidemia LDL goal <100 (10/22/2010), Hypertension goal BP (blood pressure) < 140/90 (10/22/2010), Obesity, and Screening colonoscopy (02/06/2006).    He has no past medical history of Bleeding disorder (H), Cancer (H), Cerebral infarction (H), Congestive heart failure (H), Congestive heart failure, unspecified, COPD (chronic obstructive pulmonary disease) (H), Coronary artery disease, Depressive disorder, Glaucoma, Heart disease, History of blood transfusion, Inflammatory arthritis, Kidney disease, Macular degeneration, Nonsenile cataract, Retinal detachment, Strabismus, " Stroke (H), Surgical complications, Thyroid disease, Uncomplicated asthma, Unspecified asthma(493.90), or Uveitis.  Patient Active Problem List   Diagnosis     obesity     Hyperlipidemia LDL goal <100     Hypertension goal BP (blood pressure) < 140/90     Fracture of humerus, proximal, right, closed     Advanced directives, counseling/discussion     Hypertriglyceridemia     HDL lipoprotein deficiency     Controlled type 2 diabetes mellitus with diabetic polyneuropathy, without long-term current use of insulin (H)     Herpes zoster ophthalmicus     Eczema, unspecified type     Gastroesophageal reflux disease, esophagitis presence not specified     S/P total hip arthroplasty       Surgical Hx:  has a past surgical history that includes closed rx humeral supracondylar fx (2011); NASAL SURG PROC UNLISTED (1980's); Colonoscopy with CO2 insufflation (N/A, 12/8/2017); fracture tx, hip rt/lt (6/4/99); PELVIS/HIP JOINT SURGERY UNLISTED (2019); hernia repair; colonoscopy (12/10/2021); and Colonoscopy with CO2 insufflation (N/A, 12/10/2021).    Medications:   Current Outpatient Medications:      albuterol (PROAIR HFA/PROVENTIL HFA/VENTOLIN HFA) 108 (90 Base) MCG/ACT inhaler, Inhale 2 puffs into the lungs every 6 hours, Disp: , Rfl:      aspirin 325 MG EC tablet, Take 1 tablet by mouth daily., Disp: 100 tablet, Rfl: 1     BD PEN NEEDLE JENNIFER 2ND GEN 32G X 4 MM miscellaneous, USE AS DIRECTED ONCE DAILY, Disp: 100 each, Rfl: 1     bisacodyl (DULCOLAX) 5 MG EC tablet, Take 4 tablets (20 mg) by mouth See Admin Instructions Follow his my chart instructions for colon prep, Disp: 4 tablet, Rfl: 0     blood glucose (NO BRAND SPECIFIED) test strip, Use to test blood sugar 3 times daily or as directed. To accompany: Blood Glucose Monitor Brands: per insurance., Disp: 100 strip, Rfl: 6     blood glucose (ONETOUCH ULTRA) test strip, TEST TWICE DAILY OR AS DIRECTED, Disp: 200 strip, Rfl: 2     blood glucose calibration (NO BRAND SPECIFIED)  solution, To accompany: Blood Glucose Monitor Brands: per insurance., Disp: 1 Bottle, Rfl: 3     blood glucose monitoring (NO BRAND SPECIFIED) meter device kit, Use to test blood sugar 3 times daily or as directed. Preferred blood glucose meter OR supplies to accompany: Blood Glucose Monitor Brands: per insurance., Disp: 1 kit, Rfl: 0     cholecalciferol (VITAMIN D) 400 UNIT TABS, Take 400 Units by mouth 2 times daily., Disp: , Rfl:      clindamycin (CLEOCIN) 150 MG capsule, Take 450mg ([id number]) clindamycin capsules 1 hour prior to dental procedures., Disp: 3 capsule, Rfl: 3     clobetasol (TEMOVATE) 0.05 % cream, Apply two times a day to affected areas until cleared, Disp: 45 g, Rfl: 3     CONTOUR NEXT TEST test strip, TEST THREE TIMES DAILY, Disp: 300 strip, Rfl: 3     glimepiride (AMARYL) 4 MG tablet, TAKE 1 TABLET(4 MG) BY MOUTH EVERY MORNING BEFORE BREAKFAST, Disp: 90 tablet, Rfl: 0     ibuprofen (ADVIL,MOTRIN) 200 MG tablet, Take 200 mg by mouth every 4 hours as needed, Disp: , Rfl:      insulin glargine (BASAGLAR KWIKPEN) 100 UNIT/ML pen, Inject 60 Units Subcutaneous At Bedtime, Disp: 60 mL, Rfl: 0     losartan (COZAAR) 25 MG tablet, Take 1 tablet (25 mg) by mouth daily, Disp: 180 tablet, Rfl: 1     metFORMIN (GLUCOPHAGE) 1000 MG tablet, TAKE 1 TABLET BY MOUTH TWICE DAILY WITH MEALS, Disp: 180 tablet, Rfl: 1     Microlet Lancets MISC, TEST THREE TIMES DAILY, Disp: , Rfl:      MULTIPLE VITAMIN PO, Take 1 tablet by mouth 2 times daily., Disp: , Rfl:      multivitamin w/minerals (MULTIVITAMIN, THERAPEUTIC WITH MINERALS) tablet, Take 1 tablet by mouth daily, Disp: , Rfl:      order for DME, Equipment being ordered: glucostrips for the  one touch ultra 2 glucometer 2 times a day, Disp: 3 Box, Rfl: 3     polyethylene glycol (GOLYTELY) 236 g suspension, Take 4,000 mLs by mouth See Admin Instructions Follow his my chart instructions for colon prep., Disp: 4000 mL, Rfl: 0     sildenafil (VIAGRA) 100 MG tablet, Take 1  "tablet (100 mg) by mouth daily as needed Take 30 min to 4 hours before intercourse.  Never use with nitroglycerin, terazosin or doxazosin., Disp: 6 tablet, Rfl: 1     simvastatin (ZOCOR) 20 MG tablet, TAKE 1 TABLET(20 MG) BY MOUTH DAILY, Disp: 90 tablet, Rfl: 0    Allergies:   Allergies   Allergen Reactions     Lisinopril Swelling and Other (See Comments)     Facial swelling     Penicillins Anaphylaxis       Social Hx: retired .   reports that he quit smoking about 32 years ago. His smoking use included cigarettes. He started smoking about 47 years ago. He smoked 0.00 packs per day for 15.00 years. He has never used smokeless tobacco. He reports current alcohol use. He reports that he does not use drugs.    Family Hx: family history includes Arthritis in his mother; Cardiovascular in his brother, father, mother, and paternal grandfather; Cerebrovascular Disease in his father; Diabetes in his father and mother; Eye Disorder in his father; Heart Disease in his brother, father, mother, and paternal grandfather; Hypertension in his brother..     REVIEW OF SYSTEMS: 10 point ROS neg other than the symptoms noted above in the HPI and PMH. Notables include  CONSTITUTIONAL:NEGATIVE for fever, chills, change in weight  INTEGUMENTARY/SKIN: NEGATIVE for worrisome rashes, moles or lesions  MUSCULOSKELETAL:See HPI above  Neurology: see HPI above.      EXAM:  BP (!) 155/81   Pulse 70   Ht 1.822 m (5' 11.75\")   Wt 116.1 kg (256 lb)   BMI 34.96 kg/m      GENERAL APPEARANCE: healthy, alert and no distress   GAIT: NORMAL  SKIN: no suspicious lesions or rashes  RESPIRATORY: No increased work of breathing.  NEURO:  strength: decreased, Sensation intact in all digits  PSYCH:  mentation appears normal and affect normal, not anxious.    MUSCULOSKELETAL:    RIGHT HAND/FINGERS:   Skin intact. Normal wear pattern, color and tone.   No clubbing , mild nail pitting  Range of Motion All Normal    Good capillary refill " to all fingers. 2+ radial pulse.    Triggering noted: none  Tenderness over A1 pulley of none of the  fingers.    Intact EPL, FPL, FDP, EDC, wrist flexion/extension, biceps/triceps  Intact sensation to light touch in median, radial and ulnar nerve distributions.      LEFT HAND/FINGERS:   Skin intact. Normal wear pattern, color and tone.   No clubbing, mild nail pitting.  Range of Motion All Normal    Good capillary refill to all fingers. 2+ radial pulse.    Triggering noted: crepitus of the small finger without gross locking  Tenderness over A1 pulley of left small finger.    Likely early dupuytrens cords in the palm, notably long finger    Intact EPL, FPL, FDP, EDC, wrist flexion/extension, biceps/triceps  Intact sensation to light touch in median, radial and ulnar nerve distributions.      XRAYS: none indicated.      ASSESSMENT: 67yo RHD male with left small trigger finger.        PLAN:     We talked about the options: taping the PIP, splinting the PIP joint, corticosteroid injections, hand therapy and surgery. I explained the risks and benefits of each, including recurrence. I have explained the nature of the surgical procedure, the risks and recovery time with the patient.  * At this time, patient would like to proceed with: oval-8 finger splint, since getting better since onset.  * an oval-8 finger splint was provided  * Return to clinic as needed. Consider injection or surgery in future as needed.    Freedom Osborne M.D., M.S.  Dept. of Orthopaedic Surgery  [facility]

## 2022-02-16 NOTE — NURSING NOTE
Patient fitted with a size 9 Oval 8 splint for his left small finger.    Minal Ocampo MS, ATC  Certified Athletic Trainer

## 2022-02-17 PROBLEM — K21.9 GASTROESOPHAGEAL REFLUX DISEASE: Status: ACTIVE | Noted: 2017-06-09

## 2022-03-21 DIAGNOSIS — E11.42 CONTROLLED TYPE 2 DIABETES MELLITUS WITH DIABETIC POLYNEUROPATHY, WITHOUT LONG-TERM CURRENT USE OF INSULIN (H): ICD-10-CM

## 2022-03-21 DIAGNOSIS — E78.5 HYPERLIPIDEMIA LDL GOAL <100: ICD-10-CM

## 2022-03-22 RX ORDER — SIMVASTATIN 20 MG
TABLET ORAL
Qty: 90 TABLET | Refills: 3 | Status: SHIPPED | OUTPATIENT
Start: 2022-03-22 | End: 2023-03-16

## 2022-03-22 RX ORDER — GLIMEPIRIDE 4 MG/1
TABLET ORAL
Qty: 90 TABLET | Refills: 1 | Status: SHIPPED | OUTPATIENT
Start: 2022-03-22 | End: 2022-09-18

## 2022-03-22 NOTE — TELEPHONE ENCOUNTER
"Prescription approved per [facility] Refill Protocol.      Requested Prescriptions   Pending Prescriptions Disp Refills     glimepiride (AMARYL) 4 MG tablet [Pharmacy Med Name: GLIMEPIRIDE 4MG TABLETS] 90 tablet 0     Sig: TAKE 1 TABLET(4 MG) BY MOUTH EVERY MORNING BEFORE BREAKFAST       Sulfonylurea Agents Passed - 3/21/2022  5:56 AM        Passed - Patient has documented A1c within the specified period of time.     If HgbA1C is 8 or greater, it needs to be on file within the past 3 months.  If less than 8, must be on file within the past 6 months.     Recent Labs   Lab Test 02/14/22 1753   A1C 6.7*             Passed - Medication is active on med list        Passed - Patient is age 18 or older        Passed - Patient has a recent creatinine (normal) within the past 12 mos.     Recent Labs   Lab Test 02/14/22 1753   CR 0.90       Ok to refill medication if creatinine is low          Passed - Recent (6 mo) or future (30 days) visit within the authorizing provider's specialty     Patient had office visit in the last 6 months or has a visit in the next 30 days with authorizing provider or within the authorizing provider's specialty.  See \"Patient Info\" tab in inbasket, or \"Choose Columns\" in Meds & Orders section of the refill encounter.               simvastatin (ZOCOR) 20 MG tablet [Pharmacy Med Name: SIMVASTATIN 20MG TABLETS] 90 tablet 0     Sig: TAKE 1 TABLET(20 MG) BY MOUTH DAILY       Statins Protocol Passed - 3/21/2022  5:56 AM        Passed - LDL on file in past 12 months     Recent Labs   Lab Test 02/14/22 1753   LDL 65             Passed - No abnormal creatine kinase in past 12 months     No lab results found.             Passed - Recent (12 mo) or future (30 days) visit within the authorizing provider's specialty     Patient has had an office visit with the authorizing provider or a provider within the authorizing providers department within the previous 12 mos or has a future within next 30 days. See " "\"Patient Info\" tab in inbasket, or \"Choose Columns\" in Meds & Orders section of the refill encounter.              Passed - Medication is active on med list        Passed - Patient is age 18 or older           Daysi BURRIS RN on 3/22/2022 at 3:49 PM      "

## 2022-04-15 ENCOUNTER — OFFICE VISIT (OUTPATIENT)
Dept: INTERNAL MEDICINE | Facility: CLINIC | Age: 67
End: 2022-04-15
Payer: COMMERCIAL

## 2022-04-15 VITALS
HEART RATE: 72 BPM | RESPIRATION RATE: 14 BRPM | SYSTOLIC BLOOD PRESSURE: 122 MMHG | WEIGHT: 253 LBS | TEMPERATURE: 97.5 F | OXYGEN SATURATION: 96 % | BODY MASS INDEX: 34.55 KG/M2 | DIASTOLIC BLOOD PRESSURE: 70 MMHG

## 2022-04-15 DIAGNOSIS — H61.23 BILATERAL IMPACTED CERUMEN: ICD-10-CM

## 2022-04-15 DIAGNOSIS — Z00.00 ENCOUNTER FOR INITIAL ANNUAL WELLNESS VISIT (AWV) IN MEDICARE PATIENT: Primary | ICD-10-CM

## 2022-04-15 PROCEDURE — G0438 PPPS, INITIAL VISIT: HCPCS | Performed by: INTERNAL MEDICINE

## 2022-04-15 ASSESSMENT — ENCOUNTER SYMPTOMS
EYE PAIN: 0
WEAKNESS: 0
ABDOMINAL PAIN: 0
NAUSEA: 0
HEARTBURN: 0
FEVER: 0
CHILLS: 0
DIZZINESS: 1
NERVOUS/ANXIOUS: 0
MYALGIAS: 0
SORE THROAT: 0
PARESTHESIAS: 0
HEMATOCHEZIA: 0
DIARRHEA: 0
JOINT SWELLING: 0
CONSTIPATION: 0
COUGH: 0
PALPITATIONS: 0
SHORTNESS OF BREATH: 0
DYSURIA: 0
HEADACHES: 0
ARTHRALGIAS: 0
FREQUENCY: 1
HEMATURIA: 0

## 2022-04-15 ASSESSMENT — ACTIVITIES OF DAILY LIVING (ADL): CURRENT_FUNCTION: NO ASSISTANCE NEEDED
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
## 2022-04-15 NOTE — PROGRESS NOTES
"Last appointment with me was 2-14-22  Need annual wellness visit   ShingRx vaccination against herpes zoster   Last hemoglobin a1c  [ diabetes test ] 2-14 , could wait until as long as 8/22. All laboratory studies could wait  Answers for HPI/ROS submitted by the patient on 4/15/2022  In general, how would you rate your overall physical health?: good  Frequency of exercise:: 4-5 days/week  Do you usually eat at least 4 servings of fruit and vegetables a day, include whole grains & fiber, and avoid regularly eating high fat or \"junk\" foods? : Yes  Taking medications regularly:: Yes  Medication side effects:: None  Activities of Daily Living: no assistance needed  Home safety: no safety concerns identified  Hearing Impairment:: no hearing concerns  In the past 6 months, have you been bothered by leaking of urine?: No  abdominal pain: No  Blood in stool: No  Blood in urine: No  chest pain: No  chills: No  congestion: No  constipation: No  cough: No  diarrhea: No  dizziness: Yes  ear pain: No  eye pain: No  nervous/anxious: No  fever: No  frequency: Yes  genital sores: No  headaches: No  hearing loss: No  heartburn: No  arthralgias: No  joint swelling: No  peripheral edema: No  mood changes: No  myalgias: No  nausea: No  dysuria: No  palpitations: No  Skin sensation changes: No  sore throat: No  urgency: No  rash: No  shortness of breath: No  visual disturbance: No  weakness: No  impotence: Yes  penile discharge: No  In general, how would you rate your overall mental or emotional health?: excellent  Additional concerns today:: Yes  Duration of exercise:: 15-30 minutes      "

## 2022-04-15 NOTE — PATIENT INSTRUCTIONS
Patient Education   Personalized Prevention Plan  You are due for the preventive services outlined below.  Your care team is available to assist you in scheduling these services.  If you have already completed any of these items, please share that information with your care team to update in your medical record.  Health Maintenance Due   Topic Date Due     Zoster (Shingles) Vaccine (1 of 2) Never done

## 2022-05-16 ENCOUNTER — OFFICE VISIT (OUTPATIENT)
Dept: SURGERY | Facility: CLINIC | Age: 67
End: 2022-05-16
Payer: COMMERCIAL

## 2022-05-16 VITALS
HEART RATE: 64 BPM | BODY MASS INDEX: 33.73 KG/M2 | SYSTOLIC BLOOD PRESSURE: 107 MMHG | DIASTOLIC BLOOD PRESSURE: 70 MMHG | WEIGHT: 247 LBS

## 2022-05-16 DIAGNOSIS — S39.011A STRAIN OF ABDOMINAL MUSCLE, INITIAL ENCOUNTER: Primary | ICD-10-CM

## 2022-05-16 PROCEDURE — 99213 OFFICE O/P EST LOW 20 MIN: CPT | Performed by: SURGERY
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
## 2022-05-19 ENCOUNTER — MYC MEDICAL ADVICE (OUTPATIENT)
Dept: INTERNAL MEDICINE | Facility: CLINIC | Age: 67
End: 2022-05-19
Payer: COMMERCIAL

## 2022-06-06 DIAGNOSIS — E11.42 CONTROLLED TYPE 2 DIABETES MELLITUS WITH DIABETIC POLYNEUROPATHY, WITHOUT LONG-TERM CURRENT USE OF INSULIN (H): ICD-10-CM

## 2022-06-06 RX ORDER — INSULIN GLARGINE 100 [IU]/ML
INJECTION, SOLUTION SUBCUTANEOUS
Qty: 60 ML | Refills: 0 | Status: SHIPPED | OUTPATIENT
Start: 2022-06-06 | End: 2022-09-01

## 2022-06-06 NOTE — TELEPHONE ENCOUNTER
"Requested Prescriptions   Signed Prescriptions Disp Refills    LANTUS SOLOSTAR 100 UNIT/ML soln 60 mL 0     Sig: ADMINISTER 60 UNITS UNDER THE SKIN AT BEDTIME       Long Acting Insulin Protocol Passed - 6/6/2022  8:57 AM        Passed - Serum creatinine on file in past 12 months     Recent Labs   Lab Test 02/14/22  1753   CR 0.90       Ok to refill medication if creatinine is low          Passed - HgbA1C in past 3 or 6 months     If HgbA1C is 8 or greater, it needs to be on file within the past 3 months.  If less than 8, must be on file within the past 6 months.     Recent Labs   Lab Test 02/14/22 1753   A1C 6.7*             Passed - Medication is active on med list        Passed - Patient is age 18 or older        Passed - Recent (6 mo) or future (30 days) visit within the authorizing provider's specialty     Patient had office visit in the last 6 months or has a visit in the next 30 days with authorizing provider or within the authorizing provider's specialty.  See \"Patient Info\" tab in inbasket, or \"Choose Columns\" in Meds & Orders section of the refill encounter.               Thanks,  KOLBY Cano  [facility]     "

## 2022-08-31 DIAGNOSIS — E11.42 CONTROLLED TYPE 2 DIABETES MELLITUS WITH DIABETIC POLYNEUROPATHY, WITHOUT LONG-TERM CURRENT USE OF INSULIN (H): ICD-10-CM

## 2022-09-01 RX ORDER — INSULIN GLARGINE 100 [IU]/ML
60 INJECTION, SOLUTION SUBCUTANEOUS AT BEDTIME
Qty: 60 ML | Refills: 0 | Status: SHIPPED | OUTPATIENT
Start: 2022-09-01 | End: 2022-09-02

## 2022-09-02 ENCOUNTER — MYC REFILL (OUTPATIENT)
Dept: INTERNAL MEDICINE | Facility: CLINIC | Age: 67
End: 2022-09-02

## 2022-09-02 DIAGNOSIS — E11.42 CONTROLLED TYPE 2 DIABETES MELLITUS WITH DIABETIC POLYNEUROPATHY, WITHOUT LONG-TERM CURRENT USE OF INSULIN (H): ICD-10-CM

## 2022-09-02 NOTE — TELEPHONE ENCOUNTER
"Routing refill request to provider for review/approval because:  Labs not current:  a1c  Pt has future appt  Appointments in Next Year    Nov 11, 2022  1:40 PM  (Arrive by 1:20 PM)  WELCOME TO MEDICARE VISIT with Ceiclio Pro MD  [facility] ([facility] ) [phone]        Medication pended for approval, 90 day supply with reminder.     Requested Prescriptions   Pending Prescriptions Disp Refills     LANTUS SOLOSTAR 100 UNIT/ML soln [Pharmacy Med Name: LANTUS SOLOSTAR PEN INJ 3ML] 60 mL 0     Sig: ADMINISTER 60 UNITS UNDER THE SKIN AT BEDTIME       Long Acting Insulin Protocol Failed - 8/31/2022  7:00 PM        Failed - HgbA1C in past 3 or 6 months     If HgbA1C is 8 or greater, it needs to be on file within the past 3 months.  If less than 8, must be on file within the past 6 months.     Recent Labs   Lab Test 02/14/22  1753   A1C 6.7*             Passed - Serum creatinine on file in past 12 months     Recent Labs   Lab Test 02/14/22  1753   CR 0.90       Ok to refill medication if creatinine is low          Passed - Medication is active on med list        Passed - Patient is age 18 or older        Passed - Recent (6 mo) or future (30 days) visit within the authorizing provider's specialty     Patient had office visit in the last 6 months or has a visit in the next 30 days with authorizing provider or within the authorizing provider's specialty.  See \"Patient Info\" tab in inbasket, or \"Choose Columns\" in Meds & Orders section of the refill encounter.                       "

## 2022-09-05 RX ORDER — INSULIN GLARGINE 100 [IU]/ML
60 INJECTION, SOLUTION SUBCUTANEOUS AT BEDTIME
Qty: 60 ML | Refills: 0 | Status: SHIPPED | OUTPATIENT
Start: 2022-09-05 | End: 2022-09-22

## 2022-09-09 ENCOUNTER — OFFICE VISIT (OUTPATIENT)
Dept: URGENT CARE | Facility: URGENT CARE | Age: 67
End: 2022-09-09
Payer: COMMERCIAL

## 2022-09-09 VITALS
HEART RATE: 70 BPM | TEMPERATURE: 97.4 F | BODY MASS INDEX: 34.01 KG/M2 | DIASTOLIC BLOOD PRESSURE: 78 MMHG | SYSTOLIC BLOOD PRESSURE: 135 MMHG | WEIGHT: 249 LBS | OXYGEN SATURATION: 98 %

## 2022-09-09 DIAGNOSIS — H10.023 PINK EYE DISEASE OF BOTH EYES: Primary | ICD-10-CM

## 2022-09-09 DIAGNOSIS — E11.65 TYPE 2 DIABETES MELLITUS WITH HYPERGLYCEMIA, UNSPECIFIED WHETHER LONG TERM INSULIN USE (H): ICD-10-CM

## 2022-09-09 PROCEDURE — 99213 OFFICE O/P EST LOW 20 MIN: CPT | Performed by: PHYSICIAN ASSISTANT

## 2022-09-09 RX ORDER — POLYMYXIN B SULFATE AND TRIMETHOPRIM 1; 10000 MG/ML; [USP'U]/ML
1 SOLUTION OPHTHALMIC EVERY 6 HOURS
Qty: 2 ML | Refills: 0 | Status: SHIPPED | OUTPATIENT
Start: 2022-09-09 | End: 2022-09-16

## 2022-09-09 NOTE — PROGRESS NOTES
Chief Complaint   Patient presents with     Urgent Care     Eye Infection Right Eye     On Wednesday right eye feel up with pus and blurry                    ASSESSMENT:     ICD-10-CM    1. Pink eye disease of both eyes  H10.023 trimethoprim-polymyxin b (POLYTRIM) 12352-0.1 UNIT/ML-% ophthalmic solution         PLAN: Vital signs stable.  Pinkeye right greater than left eye.  Antibiotic eyedrops.  Warm compresses for any drainage.  Has follow-up appointment scheduled with the eye doctor on Tuesday, keep this appointment.  I have discussed clinical findings with patient.  Side effects of medications discussed.  Symptomatic care is discussed.  I have discussed the possibility of  worsening symptoms and indication to RTC or go to the ER if they occur.  All questions are answered, patient indicates understanding of these issues and is in agreement with plan.   Patient care instructions are discussed/given at the end of visit.   Lots of rest and fluids.      Flaca Cotter PA-C      SUBJECTIVE:  67-year-old diabetic male presents for right greater than left red goopy eye eyes.  Started with right eye 48 hours ago but today also left eye is involved.  Right eye was plastered shut this morning when he woke up.  No eye pain, more of irritation.  Wore contacts last 48 hours ago.  Denies any current vision changes.  Last eye doctor appointment was January 2022.  He says he was told eye pressures were fine at that time.  No fever, cough, congestion.      Allergies   Allergen Reactions     Lisinopril Swelling and Other (See Comments)     Facial swelling     Penicillins Anaphylaxis       Past Medical History:   Diagnosis Date     Degenerative joint disease      Diabetes (H) 9/1/1999     Eczema      GERD (gastroesophageal reflux disease)      Hyperlipidemia LDL goal <100 10/22/2010     Hypertension goal BP (blood pressure) < 140/90 10/22/2010     Obesity      Screening colonoscopy 02/06/2006    [facility]       albuterol  (PROAIR HFA/PROVENTIL HFA/VENTOLIN HFA) 108 (90 Base) MCG/ACT inhaler, Inhale 2 puffs into the lungs every 6 hours  aspirin 325 MG EC tablet, Take 1 tablet by mouth daily.  cholecalciferol (VITAMIN D) 400 UNIT TABS, Take 400 Units by mouth 2 times daily.  glimepiride (AMARYL) 4 MG tablet, TAKE 1 TABLET(4 MG) BY MOUTH EVERY MORNING BEFORE BREAKFAST  ibuprofen (ADVIL,MOTRIN) 200 MG tablet, Take 200 mg by mouth every 4 hours as needed  losartan (COZAAR) 25 MG tablet, Take 1 tablet (25 mg) by mouth daily  metFORMIN (GLUCOPHAGE) 1000 MG tablet, TAKE 1 TABLET BY MOUTH TWICE DAILY WITH MEALS  Microlet Lancets MISC, TEST THREE TIMES DAILY  MULTIPLE VITAMIN PO, Take 1 tablet by mouth 2 times daily.  multivitamin w/minerals (THERA-VIT-M) tablet, Take 1 tablet by mouth daily  simvastatin (ZOCOR) 20 MG tablet, TAKE 1 TABLET(20 MG) BY MOUTH DAILY  BD PEN NEEDLE JENNIFER 2ND GEN 32G X 4 MM miscellaneous, USE AS DIRECTED ONCE DAILY  bisacodyl (DULCOLAX) 5 MG EC tablet, Take 4 tablets (20 mg) by mouth See Admin Instructions Follow his my chart instructions for colon prep  blood glucose (NO BRAND SPECIFIED) test strip, Use to test blood sugar 3 times daily or as directed. To accompany: Blood Glucose Monitor Brands: per insurance.  blood glucose (ONETOUCH ULTRA) test strip, TEST TWICE DAILY OR AS DIRECTED  blood glucose calibration (NO BRAND SPECIFIED) solution, To accompany: Blood Glucose Monitor Brands: per insurance.  blood glucose monitoring (NO BRAND SPECIFIED) meter device kit, Use to test blood sugar 3 times daily or as directed. Preferred blood glucose meter OR supplies to accompany: Blood Glucose Monitor Brands: per insurance.  clindamycin (CLEOCIN) 150 MG capsule, Take 450mg ([id number]) clindamycin capsules 1 hour prior to dental procedures.  clobetasol (TEMOVATE) 0.05 % cream, Apply two times a day to affected areas until cleared  CONTOUR NEXT TEST test strip, TEST THREE TIMES DAILY  insulin glargine (LANTUS SOLOSTAR) 100  UNIT/ML pen, Inject 60 Units Subcutaneous At Bedtime  order for DME, Equipment being ordered: glucostrips for the  one touch ultra 2 glucometer 2 times a day  polyethylene glycol (GOLYTELY) 236 g suspension, Take 4,000 mLs by mouth See Admin Instructions Follow his my chart instructions for colon prep. (Patient not taking: Reported on 2022)  sildenafil (VIAGRA) 100 MG tablet, Take 1 tablet (100 mg) by mouth daily as needed Take 30 min to 4 hours before intercourse.  Never use with nitroglycerin, terazosin or doxazosin.    No current facility-administered medications on file prior to visit.      Social History     Tobacco Use     Smoking status: Former Smoker     Packs/day: 0.00     Years: 15.00     Pack years: 0.00     Types: Cigarettes     Start date: 1975     Quit date: 1990     Years since quittin.7     Smokeless tobacco: Never Used   Substance Use Topics     Alcohol use: Yes     Comment: one to two drinks a week        ROS:  CONSTITUTIONAL: Negative for fatigue or fever.  EYES: as above.  ENT: Negative for sore throat   RESP: Negative for cough   CV: Negative for chest pains.  GI: Negative for vomiting.  MUSCULOSKELETAL:  Negative for significant muscle or joint pains.  NEUROLOGIC: Negative for headaches.  SKIN: Negative for rash.  PSYCH: Normal mentation for age.    OBJECTIVE:  /78 (BP Location: Left arm, Patient Position: Sitting, Cuff Size: Adult Large)   Pulse 70   Temp 97.4  F (36.3  C) (Tympanic)   Wt 112.9 kg (249 lb)   SpO2 98%   BMI 34.01 kg/m    GENERAL APPEARANCE: Healthy, alert and no distress.  EYES:Conjunctiva/sclera with injection, moderate right, mild left.  Some yellow-green drainage medial canthus bilaterally.  Pupils equal reactive to light and accommodation.  EOMs intact without pain.  Funduscopic exam grossly benign bilaterally.    NECK: Moving head neck freely   RESP: Breathing comfortably   NEURO: Awake, alert    SKIN: No rashes        Flaca Cotter,  ANA

## 2022-09-10 ENCOUNTER — OFFICE VISIT (OUTPATIENT)
Dept: URGENT CARE | Facility: URGENT CARE | Age: 67
End: 2022-09-10
Payer: COMMERCIAL

## 2022-09-10 VITALS
OXYGEN SATURATION: 96 % | BODY MASS INDEX: 33.92 KG/M2 | TEMPERATURE: 98 F | HEART RATE: 79 BPM | WEIGHT: 248.4 LBS | SYSTOLIC BLOOD PRESSURE: 147 MMHG | DIASTOLIC BLOOD PRESSURE: 69 MMHG

## 2022-09-10 DIAGNOSIS — H66.001 NON-RECURRENT ACUTE SUPPURATIVE OTITIS MEDIA OF RIGHT EAR WITHOUT SPONTANEOUS RUPTURE OF TYMPANIC MEMBRANE: Primary | ICD-10-CM

## 2022-09-10 DIAGNOSIS — H61.22 IMPACTED CERUMEN OF LEFT EAR: ICD-10-CM

## 2022-09-10 PROCEDURE — 69209 REMOVE IMPACTED EAR WAX UNI: CPT | Mod: LT | Performed by: INTERNAL MEDICINE

## 2022-09-10 PROCEDURE — 99213 OFFICE O/P EST LOW 20 MIN: CPT | Mod: 25 | Performed by: INTERNAL MEDICINE

## 2022-09-10 RX ORDER — AZITHROMYCIN 500 MG/1
500 TABLET, FILM COATED ORAL DAILY
Qty: 5 TABLET | Refills: 0 | Status: SHIPPED | OUTPATIENT
Start: 2022-09-10 | End: 2022-09-15

## 2022-09-10 NOTE — PROGRESS NOTES
ASSESSMENT AND PLAN:      ICD-10-CM    1. Non-recurrent acute suppurative otitis media of right ear without spontaneous rupture of tympanic membrane  H66.001 azithromycin (ZITHROMAX) 500 MG tablet   2. Impacted cerumen of left ear  H61.22 WI REMOVAL IMPACTED CERUMEN IRRIGATION/LVG UNILAT       Patient Instructions   Azilthromycin 500 mg for 5 days  This antibiotic chosen due to PCN allergy        Return if symptoms worsen or fail to improve, for ear check.        Nivia Mirza MD  [facility] U[facility]    Subjective     Florian Lawrence is a 67 year old who presents for Patient presents with:  Ear Problem: Pt complains of right ear pain. Loud ringing tone this morning.     an established patient of [facility].    Adult    Onset of symptoms was over night  Current and Associated symptoms: ear pain right  Thinks related to right eye infection that was treated yesterday with antibiotics   Treatment measures tried include cleaned out wax plug today with paperclip.  Predisposing factors include recent illness  - eye infection.        Objective    BP (!) 147/69 (BP Location: Left arm, Patient Position: Sitting, Cuff Size: Adult Regular)   Pulse 79   Temp 98  F (36.7  C) (Tympanic)   Wt 112.7 kg (248 lb 6.4 oz)   SpO2 96%   BMI 33.92 kg/m    Physical Exam  Vitals reviewed.   Constitutional:       Appearance: Normal appearance.   HENT:      Left Ear: There is impacted cerumen (nurse irrigation with recheck canal clean & tm NL).      Ears:      Comments: right TM red bulging  Neurological:      Mental Status: He is alert.

## 2022-09-13 ENCOUNTER — OFFICE VISIT (OUTPATIENT)
Dept: OPHTHALMOLOGY | Facility: CLINIC | Age: 67
End: 2022-09-13
Payer: COMMERCIAL

## 2022-09-13 DIAGNOSIS — H10.33 ACUTE CONJUNCTIVITIS OF BOTH EYES, UNSPECIFIED ACUTE CONJUNCTIVITIS TYPE: Primary | ICD-10-CM

## 2022-09-13 PROCEDURE — 92002 INTRM OPH EXAM NEW PATIENT: CPT | Performed by: OPHTHALMOLOGY

## 2022-09-13 RX ORDER — NEOMYCIN SULFATE, POLYMYXIN B SULFATE AND DEXAMETHASONE 3.5; 10000; 1 MG/ML; [USP'U]/ML; MG/ML
1-2 SUSPENSION/ DROPS OPHTHALMIC EVERY 4 HOURS
Status: CANCELLED | OUTPATIENT
Start: 2022-09-13

## 2022-09-13 ASSESSMENT — SLIT LAMP EXAM - LIDS
COMMENTS: 1+ DERMATOCHALASIS
COMMENTS: 1+ DERMATOCHALASIS

## 2022-09-13 ASSESSMENT — EXTERNAL EXAM - LEFT EYE: OS_EXAM: NORMAL

## 2022-09-13 ASSESSMENT — VISUAL ACUITY
OS_CC+: +1
CORRECTION_TYPE: GLASSES
OD_CC: 20/25
METHOD: SNELLEN - LINEAR
OS_CC: 20/25
OD_CC+: +2

## 2022-09-13 ASSESSMENT — EXTERNAL EXAM - RIGHT EYE: OD_EXAM: NORMAL

## 2022-09-13 NOTE — PROGRESS NOTES
Current Eye Medications:  polytrim B QID both eyes, was told to put in both eyes.      Subjective:  Here for possible eye infection right eye. Went to urgent care on Friday and was given the drops, as he could not get in with us then. Drops have helped. Infection then moved to right ear, then was given azithromycin 500 mgoral for the ear. Somewhat itchy, was told to take antihistamine.   Has Diabetes, last eye exam was last January 17 with Omar.     No sequelae from right Herpes zoster ophthalmicus in 2014.     Objective:  See Ophthalmology Exam.       Assessment:  Resolving conjunctivitis both eyes.      Plan:  Continue:  Polytrim four times daily both eyes.  Azithromycin 500 mg.  May use artificial tears up to 4 times daily both eyes. (Refresh Tears, Systane Ultra/Balance, or Theratears)   Resume contact lens after finished with Polytrim.  Return visit as needed.  Fabio Aparicio M.D.  [phone]

## 2022-09-13 NOTE — LETTER
9/13/2022         RE: Florian Lawrence  [address] [facility] [address]        Dear Colleague,    Thank you for referring your patient, Florian Lawrence, to the [facility]. Please see a copy of my visit note below.     Current Eye Medications:  polytrim B QID both eyes, was told to put in both eyes.      Subjective:  Here for possible eye infection right eye. Went to urgent care on Friday and was given the drops, as he could not get in with us then. Drops have helped. Infection then moved to right ear, then was given azithromycin 500 mgoral for the ear. Somewhat itchy, was told to take antihistamine.   Has Diabetes, last eye exam was last January 17 with Omar.     No sequelae from right Herpes zoster ophthalmicus in 2014.     Objective:  See Ophthalmology Exam.       Assessment:  Resolving conjunctivitis both eyes.      Plan:  Continue:  Polytrim four times daily both eyes.  Azithromycin 500 mg.  May use artificial tears up to 4 times daily both eyes. (Refresh Tears, Systane Ultra/Balance, or Theratears)   Resume contact lens after finished with Polytrim.  Return visit as needed.  Fabio Aparicio M.D.  [phone]          Again, thank you for allowing me to participate in the care of your patient.        Sincerely,        Fabio Aparicio MD

## 2022-09-13 NOTE — PATIENT INSTRUCTIONS
Continue:  Polytrim four times daily both eyes.  Azithromycin 500 mg.  May use artificial tears up to 4 times daily both eyes. (Refresh Tears, Systane Ultra/Balance, or Theratears)   Resume contact lens after finished with Polytrim.  Return visit as needed.  Fabio Aparicio M.D.  [phone]

## 2022-09-17 DIAGNOSIS — E11.42 CONTROLLED TYPE 2 DIABETES MELLITUS WITH DIABETIC POLYNEUROPATHY, WITHOUT LONG-TERM CURRENT USE OF INSULIN (H): ICD-10-CM

## 2022-09-17 DIAGNOSIS — I10 HYPERTENSION GOAL BP (BLOOD PRESSURE) < 140/90: ICD-10-CM

## 2022-09-18 RX ORDER — GLIMEPIRIDE 4 MG/1
TABLET ORAL
Qty: 90 TABLET | Refills: 1 | Status: SHIPPED | OUTPATIENT
Start: 2022-09-18 | End: 2022-09-22

## 2022-09-18 RX ORDER — LOSARTAN POTASSIUM 25 MG/1
TABLET ORAL
Qty: 180 TABLET | Refills: 1 | Status: SHIPPED | OUTPATIENT
Start: 2022-09-18 | End: 2023-05-08

## 2022-09-22 ENCOUNTER — OFFICE VISIT (OUTPATIENT)
Dept: INTERNAL MEDICINE | Facility: CLINIC | Age: 67
End: 2022-09-22
Payer: COMMERCIAL

## 2022-09-22 VITALS
DIASTOLIC BLOOD PRESSURE: 74 MMHG | OXYGEN SATURATION: 97 % | TEMPERATURE: 97.7 F | BODY MASS INDEX: 34.17 KG/M2 | SYSTOLIC BLOOD PRESSURE: 146 MMHG | HEART RATE: 81 BPM | RESPIRATION RATE: 15 BRPM | WEIGHT: 250.2 LBS

## 2022-09-22 DIAGNOSIS — H69.91 DYSFUNCTION OF RIGHT EUSTACHIAN TUBE: ICD-10-CM

## 2022-09-22 DIAGNOSIS — E11.42 CONTROLLED TYPE 2 DIABETES MELLITUS WITH DIABETIC POLYNEUROPATHY, WITHOUT LONG-TERM CURRENT USE OF INSULIN (H): Primary | ICD-10-CM

## 2022-09-22 DIAGNOSIS — I72.0 CAROTID ANEURYSM, RIGHT (H): ICD-10-CM

## 2022-09-22 DIAGNOSIS — Z23 NEED FOR SHINGLES VACCINE: ICD-10-CM

## 2022-09-22 DIAGNOSIS — N18.1 CKD (CHRONIC KIDNEY DISEASE) STAGE 1, GFR 90 ML/MIN OR GREATER: ICD-10-CM

## 2022-09-22 DIAGNOSIS — L85.9 HYPERKERATOSIS: ICD-10-CM

## 2022-09-22 DIAGNOSIS — E66.01 MORBID OBESITY (H): ICD-10-CM

## 2022-09-22 DIAGNOSIS — R07.9 CHEST PAIN, UNSPECIFIED TYPE: ICD-10-CM

## 2022-09-22 DIAGNOSIS — Z23 NEED FOR PROPHYLACTIC VACCINATION AND INOCULATION AGAINST INFLUENZA: ICD-10-CM

## 2022-09-22 LAB
CREAT UR-MCNC: 160 MG/DL
HBA1C MFR BLD: 6 % (ref 0–5.6)
MICROALBUMIN UR-MCNC: 470 MG/L
MICROALBUMIN/CREAT UR: 293.75 MG/G CR (ref 0–17)

## 2022-09-22 PROCEDURE — 99214 OFFICE O/P EST MOD 30 MIN: CPT | Mod: 25 | Performed by: INTERNAL MEDICINE

## 2022-09-22 PROCEDURE — G0008 ADMIN INFLUENZA VIRUS VAC: HCPCS | Performed by: INTERNAL MEDICINE

## 2022-09-22 PROCEDURE — 90662 IIV NO PRSV INCREASED AG IM: CPT | Performed by: INTERNAL MEDICINE

## 2022-09-22 PROCEDURE — 83036 HEMOGLOBIN GLYCOSYLATED A1C: CPT | Performed by: INTERNAL MEDICINE

## 2022-09-22 PROCEDURE — 82043 UR ALBUMIN QUANTITATIVE: CPT | Performed by: INTERNAL MEDICINE

## 2022-09-22 PROCEDURE — 36415 COLL VENOUS BLD VENIPUNCTURE: CPT | Performed by: INTERNAL MEDICINE

## 2022-09-22 RX ORDER — INSULIN GLARGINE 100 [IU]/ML
60 INJECTION, SOLUTION SUBCUTANEOUS AT BEDTIME
Qty: 60 ML | Refills: 5 | Status: SHIPPED | OUTPATIENT
Start: 2022-09-22 | End: 2023-05-08

## 2022-09-22 RX ORDER — AMMONIUM LACTATE 12 G/100G
CREAM TOPICAL 2 TIMES DAILY
Qty: 140 G | Refills: 1 | Status: SHIPPED | OUTPATIENT
Start: 2022-09-22 | End: 2022-12-13

## 2022-09-22 ASSESSMENT — ENCOUNTER SYMPTOMS
HEMATURIA: 0
HEMATOCHEZIA: 0
CONSTIPATION: 0
EYE PAIN: 0
NAUSEA: 0
NERVOUS/ANXIOUS: 0
PALPITATIONS: 0
FREQUENCY: 0
CHILLS: 0
HEADACHES: 0
COUGH: 0
HEARTBURN: 0
FEVER: 0
PARESTHESIAS: 0
SORE THROAT: 0
DIZZINESS: 0
SHORTNESS OF BREATH: 1
WEAKNESS: 0
DYSURIA: 0
DIARRHEA: 0
ABDOMINAL PAIN: 0
ARTHRALGIAS: 0
MYALGIAS: 0
JOINT SWELLING: 0

## 2022-09-22 ASSESSMENT — ACTIVITIES OF DAILY LIVING (ADL): CURRENT_FUNCTION: NO ASSISTANCE NEEDED

## 2022-09-22 NOTE — PROGRESS NOTES
Assessment & Plan     Controlled type 2 diabetes mellitus with diabetic polyneuropathy, without long-term current use of insulin (H)  6 month follow up office visit . Hemoglobin a1c  [ diabetes test ] today is the lowest in 6 years and it is too low   Lab Results   Component Value Date    A1C 6.0 09/22/2022    A1C 6.7 02/14/2022    A1C 6.8 10/22/2021    A1C 7.2 12/18/2020    A1C 6.7 06/30/2020    A1C 8.1 03/25/2020    A1C 6.8 10/14/2019    A1C 7.1 06/20/2019      this patient taking insulin and Metformin ( Glucophage)  And glimepiride [ Amaryl ] has had at the very least 3 distinct severe hypoglycemia events just over last few months . He's been below 50 and associated with lots of beta agonist symptoms of sweaty, clamminess and sweatiness and feeling poorly. This is unacceptable and given a choice I would hands down take mildly out of control diabetes mellitus rather then lows like this which are well known and associated with potential irreversible neurologic injury. So we opted to stop his glimepiride [ Amaryl ] today completely and he is instructed to send us his home blood glucose monitoring data in one month. Further follow up may be necessary   - Hemoglobin A1c; Future  - insulin glargine (LANTUS SOLOSTAR) 100 UNIT/ML pen; Inject 60 Units Subcutaneous At Bedtime  - metFORMIN (GLUCOPHAGE) 1000 MG tablet; Take 1 tablet (1,000 mg) by mouth 2 times daily (with meals)  - Albumin Random Urine Quantitative with Creat Ratio; Future  - REVIEW OF HEALTH MAINTENANCE PROTOCOL ORDERS  - Hemoglobin A1c  - Albumin Random Urine Quantitative with Creat Ratio    Carotid aneurysm, right (H)  Ongoing monitoring    - REVIEW OF HEALTH MAINTENANCE PROTOCOL ORDERS    Morbid obesity (H)  Weight loss measures reviewed, technically this diagnosis is inaccurate and needs to be deleted at the next appointment if his body mass index remains under 35  Body mass index is 34.17 kg/m .   - REVIEW OF HEALTH MAINTENANCE PROTOCOL  ORDERS    Chest pain, unspecified type  During this apartment patient brought up an entirely new concern. He's getting some chest symptoms consistent with angina pectoralis. The chest discomfort is subtle but definitely ,, he's having symptoms worse with activity, relieved with rest and it's a central chest tightness along with a component of dyspnea on exertion and with sitting down his symptoms resolve within 3-6 minutes. He never had these symptoms before and now has had these for about 3-4 months. We agreed to start with a stress echocardiogram and further follow up depending on the test results   - REVIEW OF HEALTH MAINTENANCE PROTOCOL ORDERS  - Echocardiogram Exercise Stress; Future    Need for shingles vaccine  Postponed   - REVIEW OF HEALTH MAINTENANCE PROTOCOL ORDERS    Dysfunction of right eustachian tube  The next thing is a lingering symptom of decreased hearing along with some lowgrade tinnitus from only the right ear. He had a minor cold that may have been the origin of these symptoms. We agreed to keep an eye on things over the next few weeks and then depending on how things go he may need a further follow up with Ear, Nose, and Throat specialist      Hyperkeratosis  Finally he has a hyperkeratotic 2nd toe on right foot. This brings urinary output no red flags, lets try treating with ammonium lactate (LAC-HYDRIN) and then further follow up possibly with Podiatrist if we aren't making progress  - ammonium lactate (AMLACTIN) 12 % external cream; Apply topically 2 times daily    Need for prophylactic vaccination and inoculation against influenza    - INFLUENZA, QUAD, HIGH DOSE, PF, 65YR + (FLUZONE HD)  - ADMIN INFLUENZA (For MEDICARE Patients ONLY) []    Review of the result(s) of each unique test - todays test results  Prescription drug management  33 minutes spent on the date of the encounter doing chart review, history and exam, documentation and further activities per the note      Return in  "about 6 months (around 3/22/2023).    Cecilio Pro MD  [facility] EL Arguello is a 67 year old presenting for the following health issues:  Diabetes    Last appointment with me was 4-2022  His numbers before meals are overwhelmingly in the correct range   He's had some below 50, over last few months   Lab Results   Component Value Date    A1C 6.0 09/22/2022    A1C 6.7 02/14/2022    A1C 6.8 10/22/2021    A1C 7.2 12/18/2020    A1C 6.7 06/30/2020    A1C 8.1 03/25/2020    A1C 6.8 10/14/2019    A1C 7.1 06/20/2019     Wt Readings from Last 5 Encounters:   09/22/22 113.5 kg (250 lb 3.2 oz)   09/10/22 112.7 kg (248 lb 6.4 oz)   09/09/22 112.9 kg (249 lb)   05/16/22 112 kg (247 lb)   04/15/22 114.8 kg (253 lb)     He's had golf more often and keeps up with his physical activity   We reviewed his home blood glucose monitoring data  And he's had approximately 4 different episodes of symptomatic hypoglycemia with numbers below 50 ! 30 readings below 70. We are seeing the lowest hemoglobin a1c  [ diabetes test ] we have seen with this patient in at the very least 3-4 years       Healthy Habits:     In general, how would you rate your overall health?  Excellent    Frequency of exercise:  2-3 days/week    Duration of exercise:  15-30 minutes    Do you usually eat at least 4 servings of fruit and vegetables a day, include whole grains    & fiber and avoid regularly eating high fat or \"junk\" foods?  Yes    Taking medications regularly:  Yes    Medication side effects:  None    Ability to successfully perform activities of daily living:  No assistance needed    Home Safety:  No safety concerns identified    Hearing Impairment:  No hearing concerns    In the past 6 months, have you been bothered by leaking of urine?  No    In general, how would you rate your overall mental or emotional health?  Excellent      PHQ-2 Total Score: 0    Additional concerns today:  Yes         Review of Systems "   Constitutional: Negative for chills and fever.   HENT: Positive for ear pain. Negative for congestion, hearing loss and sore throat.    Eyes: Negative for pain and visual disturbance.   Respiratory: Positive for shortness of breath. Negative for cough.    Cardiovascular: Positive for chest pain. Negative for palpitations and peripheral edema.   Gastrointestinal: Negative for abdominal pain, constipation, diarrhea, heartburn, hematochezia and nausea.   Genitourinary: Negative for dysuria, frequency, genital sores, hematuria, impotence, penile discharge and urgency.   Musculoskeletal: Negative for arthralgias, joint swelling and myalgias.   Skin: Negative for rash.   Neurological: Negative for dizziness, weakness, headaches and paresthesias.   Psychiatric/Behavioral: Negative for mood changes. The patient is not nervous/anxious.       Constitutional, HEENT, cardiovascular, pulmonary, gi and gu systems are negative, except as otherwise noted.      Objective    BP (!) 146/74   Pulse 81   Temp 97.7  F (36.5  C) (Oral)   Resp 15   Wt 113.5 kg (250 lb 3.2 oz)   SpO2 97%   BMI 34.17 kg/m    Body mass index is 34.17 kg/m .  Physical Exam   GENERAL: healthy, alert and no distress  EYES: Eyes grossly normal to inspection, PERRL and conjunctivae and sclerae normal  HENT: ear canals and TM's normal, nose and mouth without ulcers or lesions  NECK: no adenopathy, no asymmetry, masses, or scars and thyroid normal to palpation  RESP: lungs clear to auscultation - no rales, rhonchi or wheezes  CV: regular rate and rhythm, normal S1 S2, no S3 or S4, no murmur, click or rub, no peripheral edema and peripheral pulses strong  MS: toe deformity - due to hyperkeratosis , see as detailed above     Orders Placed This Encounter   Procedures     REVIEW OF HEALTH MAINTENANCE PROTOCOL ORDERS     INFLUENZA, QUAD, HIGH DOSE, PF, 65YR + (FLUZONE HD)     ADMIN INFLUENZA (For MEDICARE Patients ONLY) []     Hemoglobin A1c     Albumin  Random Urine Quantitative with Creat Ratio     Echocardiogram Exercise Stress

## 2022-09-22 NOTE — LETTER
September 23, 2022        Jos Lawrence  1956 [facility] [id number]      Dear ,    We are writing to inform you of your test results.    Normal and negative Albumin random urine quantitative     We already reviewed the hemoglobin a1c  [ diabetes test ]     Please let me know if you have any questions for me about all of these lab tests       Resulted Orders   Hemoglobin A1c   Result Value Ref Range    Hemoglobin A1C 6.0 (H) 0.0 - 5.6 %      Comment:      Normal <5.7%   Prediabetes 5.7-6.4%    Diabetes 6.5% or higher     Note: Adopted from ADA consensus guidelines.   Albumin Random Urine Quantitative with Creat Ratio   Result Value Ref Range    Creatinine Urine mg/dL 160 mg/dL    Albumin Urine mg/L 470 mg/L    Albumin Urine mg/g Cr 293.75 (H) 0.00 - 17.00 mg/g Cr       If you have any questions or concerns, please call the clinic at the number listed above.       Sincerely,      Cecilio Pro MD

## 2022-10-12 ENCOUNTER — ANCILLARY PROCEDURE (OUTPATIENT)
Dept: CARDIOLOGY | Facility: CLINIC | Age: 67
End: 2022-10-12
Attending: INTERNAL MEDICINE
Payer: COMMERCIAL

## 2022-10-12 DIAGNOSIS — R07.9 CHEST PAIN, UNSPECIFIED TYPE: ICD-10-CM

## 2022-10-12 PROBLEM — N18.1 CKD (CHRONIC KIDNEY DISEASE) STAGE 1, GFR 90 ML/MIN OR GREATER: Status: ACTIVE | Noted: 2022-10-12

## 2022-10-12 PROCEDURE — 93350 STRESS TTE ONLY: CPT | Mod: 26 | Performed by: INTERNAL MEDICINE

## 2022-10-12 PROCEDURE — 93321 DOPPLER ECHO F-UP/LMTD STD: CPT | Mod: TC | Performed by: INTERNAL MEDICINE

## 2022-10-12 PROCEDURE — 93352 ADMIN ECG CONTRAST AGENT: CPT | Performed by: INTERNAL MEDICINE

## 2022-10-12 PROCEDURE — 93016 CV STRESS TEST SUPVJ ONLY: CPT | Performed by: INTERNAL MEDICINE

## 2022-10-12 PROCEDURE — 93018 CV STRESS TEST I&R ONLY: CPT | Performed by: INTERNAL MEDICINE

## 2022-10-12 PROCEDURE — 93350 STRESS TTE ONLY: CPT | Mod: TC | Performed by: INTERNAL MEDICINE

## 2022-10-12 PROCEDURE — 93325 DOPPLER ECHO COLOR FLOW MAPG: CPT | Mod: TC | Performed by: INTERNAL MEDICINE

## 2022-10-12 PROCEDURE — 99207 PR STATISTIC IV PUSH SINGLE INITIAL SUBSTANCE: CPT | Performed by: INTERNAL MEDICINE

## 2022-10-12 PROCEDURE — 93321 DOPPLER ECHO F-UP/LMTD STD: CPT | Mod: 26 | Performed by: INTERNAL MEDICINE

## 2022-10-12 PROCEDURE — 93017 CV STRESS TEST TRACING ONLY: CPT | Performed by: INTERNAL MEDICINE

## 2022-10-12 PROCEDURE — 93325 DOPPLER ECHO COLOR FLOW MAPG: CPT | Mod: 26 | Performed by: INTERNAL MEDICINE

## 2022-10-12 RX ADMIN — Medication 3 ML: at 10:07

## 2022-10-12 NOTE — RESULT ENCOUNTER NOTE
Please call patient and schedule CTA:  Your stress test was non-diagnostic (neither abnormal or normal) so further testing with a coronary CTA is recommended.  Debra Barros MD

## 2022-11-05 ENCOUNTER — MYC MEDICAL ADVICE (OUTPATIENT)
Dept: INTERNAL MEDICINE | Facility: CLINIC | Age: 67
End: 2022-11-05

## 2022-11-05 DIAGNOSIS — R07.9 CHEST PAIN, UNSPECIFIED TYPE: Primary | ICD-10-CM

## 2022-11-11 NOTE — TELEPHONE ENCOUNTER
RECORDS RECEIVED FROM:   DATE RECEIVED:   NOTES STATUS DETAILS   OFFICE NOTE from referring provider    Internal PCP   OFFICE NOTE from other cardiologist    N/A    SUMMARY from hospital/ED   N/A    MEDICATION LIST   In process    DIAGNOSTIC PROCEDURES     EKG   Care Everywhere 1-21-21   Monitor Reports   N/A    IMAGING (DISC & REPORT)      Echo   Internal 11-12-22   Stress Tests   Internal 11-12-22   Cath   N/A    MRI/MRA   N/A    CT/CTA   N/A

## 2022-11-11 NOTE — TELEPHONE ENCOUNTER
Dr. Valladares,    This patient is scheduled to see you in consult for non diagnostic stress test. He says his insurance will not cover the cost of CTA. Should he see you first or would you prefer that I order another test for his PCP who is out of office?     Thanks,  Debra Barros MD

## 2022-12-01 ENCOUNTER — ANCILLARY PROCEDURE (OUTPATIENT)
Dept: CARDIOLOGY | Facility: CLINIC | Age: 67
End: 2022-12-01
Attending: FAMILY MEDICINE
Payer: COMMERCIAL

## 2022-12-01 DIAGNOSIS — R07.9 CHEST PAIN, UNSPECIFIED TYPE: ICD-10-CM

## 2022-12-01 PROCEDURE — 93325 DOPPLER ECHO COLOR FLOW MAPG: CPT | Performed by: INTERNAL MEDICINE

## 2022-12-01 PROCEDURE — 93321 DOPPLER ECHO F-UP/LMTD STD: CPT | Performed by: INTERNAL MEDICINE

## 2022-12-01 PROCEDURE — 93018 CV STRESS TEST I&R ONLY: CPT | Performed by: INTERNAL MEDICINE

## 2022-12-01 PROCEDURE — 93350 STRESS TTE ONLY: CPT | Performed by: INTERNAL MEDICINE

## 2022-12-01 PROCEDURE — 93017 CV STRESS TEST TRACING ONLY: CPT | Performed by: INTERNAL MEDICINE

## 2022-12-01 PROCEDURE — 93016 CV STRESS TEST SUPVJ ONLY: CPT | Performed by: INTERNAL MEDICINE

## 2022-12-01 RX ORDER — DOBUTAMINE HYDROCHLORIDE 200 MG/100ML
2.5-2 INJECTION INTRAVENOUS CONTINUOUS
Status: ACTIVE | OUTPATIENT
Start: 2022-12-01

## 2022-12-01 RX ORDER — ATROPINE SULFATE 0.4 MG/ML
0.4 AMPUL (ML) INJECTION ONCE
Status: COMPLETED | OUTPATIENT
Start: 2022-12-01 | End: 2022-12-01

## 2022-12-01 RX ORDER — METOPROLOL TARTRATE 1 MG/ML
5 INJECTION, SOLUTION INTRAVENOUS EVERY 5 MIN PRN
Status: ACTIVE | OUTPATIENT
Start: 2022-12-01

## 2022-12-01 RX ADMIN — DOBUTAMINE HYDROCHLORIDE 10 MCG/KG/MIN: 200 INJECTION INTRAVENOUS at 14:53

## 2022-12-01 RX ADMIN — Medication 0.4 MG: at 15:08

## 2022-12-01 RX ADMIN — Medication 5 ML: at 14:41

## 2022-12-02 NOTE — RESULT ENCOUNTER NOTE
Jos,    Your stress test was not able to diagnose a problem. Keep your appointment with the cardiologist and follow-up with your provider if you are having symptoms.     Debra Barros MD

## 2022-12-13 ENCOUNTER — OFFICE VISIT (OUTPATIENT)
Dept: CARDIOLOGY | Facility: CLINIC | Age: 67
End: 2022-12-13
Attending: FAMILY MEDICINE
Payer: COMMERCIAL

## 2022-12-13 ENCOUNTER — PRE VISIT (OUTPATIENT)
Dept: CARDIOLOGY | Facility: CLINIC | Age: 67
End: 2022-12-13

## 2022-12-13 VITALS
SYSTOLIC BLOOD PRESSURE: 135 MMHG | HEIGHT: 72 IN | BODY MASS INDEX: 33.85 KG/M2 | WEIGHT: 249.9 LBS | HEART RATE: 76 BPM | OXYGEN SATURATION: 98 % | DIASTOLIC BLOOD PRESSURE: 81 MMHG

## 2022-12-13 DIAGNOSIS — E78.5 HYPERLIPIDEMIA LDL GOAL <100: ICD-10-CM

## 2022-12-13 DIAGNOSIS — R07.9 CHEST PAIN, UNSPECIFIED TYPE: ICD-10-CM

## 2022-12-13 DIAGNOSIS — I20.89 STABLE ANGINA PECTORIS (H): ICD-10-CM

## 2022-12-13 DIAGNOSIS — E66.09 CLASS 1 OBESITY DUE TO EXCESS CALORIES WITH SERIOUS COMORBIDITY AND BODY MASS INDEX (BMI) OF 33.0 TO 33.9 IN ADULT: ICD-10-CM

## 2022-12-13 DIAGNOSIS — I10 HYPERTENSION GOAL BP (BLOOD PRESSURE) < 140/90: ICD-10-CM

## 2022-12-13 DIAGNOSIS — R94.39 ABNORMAL DOBUTAMINE STRESS ECHOCARDIOGRAM: ICD-10-CM

## 2022-12-13 DIAGNOSIS — E66.811 CLASS 1 OBESITY DUE TO EXCESS CALORIES WITH SERIOUS COMORBIDITY AND BODY MASS INDEX (BMI) OF 33.0 TO 33.9 IN ADULT: ICD-10-CM

## 2022-12-13 DIAGNOSIS — E78.1 HYPERTRIGLYCERIDEMIA: ICD-10-CM

## 2022-12-13 DIAGNOSIS — E11.42 CONTROLLED TYPE 2 DIABETES MELLITUS WITH DIABETIC POLYNEUROPATHY, WITHOUT LONG-TERM CURRENT USE OF INSULIN (H): Primary | ICD-10-CM

## 2022-12-13 PROCEDURE — G0463 HOSPITAL OUTPT CLINIC VISIT: HCPCS

## 2022-12-13 PROCEDURE — 99204 OFFICE O/P NEW MOD 45 MIN: CPT | Performed by: INTERNAL MEDICINE

## 2022-12-13 PROCEDURE — 99212 OFFICE O/P EST SF 10 MIN: CPT | Performed by: INTERNAL MEDICINE

## 2022-12-13 ASSESSMENT — PAIN SCALES - GENERAL: PAINLEVEL: NO PAIN (0)

## 2022-12-13 NOTE — PATIENT INSTRUCTIONS
Medication Changes & Instructions:  No changes      Follow up Appointment Information:  Scheduled for a CT Coronary Angiogram  Follow-up as needed  Dr. Valladares will review the results      [address]  [place] [address]      Thank you for allowing us to be a part of your care here at the [facility].      If you have questions or concerns please contact us at:  Cardiovascular Clinic  [facility]   [phone] Press #1 to send a message to your care team  On Call Cardiologist for after hours or on weekends: [phone]  option #4     *All co-payments are due at the time of services, if financial concerns are keeping you from attending scheduled clinic visits please contact our financial counselors at [phone] for further assistance.   * Please note that you will NOT receive a reminder call regarding your scheduled testing, reminder calls are for provider appointments only.  If you are scheduled for testing within the [facility] system you may receive a call regarding pre-registration for billing purposes only.**

## 2022-12-13 NOTE — NURSING NOTE
"Patient educated to the following during visit and educated to contact RN or MD for any questions.     Plan reviewed with patient:   CT coronary angiogram  Follow-up as needed      Reviewed Med list and verified all medications with patient. Updated medication list printed    Lab:  results reviewed in clinic. Patient demonstrated understanding.     Diet: Patient instructed regarding a heart healthy diet, including discussion of reduced fat and sodium intake. Patient demonstrated understanding of this information and agreed to call with further questions or concerns    Completed AVS    Follow-up orders placed    Marked chart \"Ready for Checkout\"      Patient verbalized understanding of plan and follow-up and agreed to call with further questions or concerns.      Patient brought POD for scheduling    Abi Levine RN        "
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
## 2022-12-13 NOTE — NURSING NOTE
Chief Complaint   Patient presents with     New Patient     NEW cardiology referral      Vitals were taken and medications reconciled.    Lc Boucher, ANNE-MARIE  9:13 AM
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
## 2022-12-20 ENCOUNTER — TELEPHONE (OUTPATIENT)
Dept: ORTHOPEDICS | Facility: CLINIC | Age: 67
End: 2022-12-20

## 2022-12-20 DIAGNOSIS — Z96.641 STATUS POST TOTAL REPLACEMENT OF RIGHT HIP: Primary | ICD-10-CM

## 2022-12-20 RX ORDER — CLINDAMYCIN HCL 150 MG
CAPSULE ORAL
Qty: 3 CAPSULE | Refills: 3 | Status: SHIPPED | OUTPATIENT
Start: 2022-12-20

## 2022-12-20 NOTE — TELEPHONE ENCOUNTER
Pharmacy requesting refill for Clindamycin 150mg capsules.    Rx not found on patient's medication list.

## 2023-01-03 ENCOUNTER — TELEPHONE (OUTPATIENT)
Dept: CARDIOLOGY | Facility: CLINIC | Age: 68
End: 2023-01-03

## 2023-01-03 NOTE — TELEPHONE ENCOUNTER
[facility] Call Center    Phone Message    May a detailed message be left on voicemail: yes     Reason for Call: Other: Insurance won't pay for the CTA ANGIOGRAM CORONARY ARTERY.   Could Dr. Valladares call the insurance company and try to get it approved that way?  Please cancel the appts for Jan 9th and call pt to discuss what else could be done insteat of this test.       Action Taken: Message routed to:  [facility] ([facility] cardio    Travel Screening: Not Applicable     Thank you!  [facility]

## 2023-01-05 NOTE — TELEPHONE ENCOUNTER
Prescription approved per [facility] Refill Protocol.  Matilda Zayas RN    
PAST MEDICAL HISTORY:  2019 novel coronavirus disease (COVID-19) 4/2020- REHAB admission    Anemia iron infusions and PRBC transfusions    Aortic stenosis     Chronic kidney disease (CKD)     Eczema     Heart murmur     HTN (hypertension)

## 2023-01-06 ENCOUNTER — VIRTUAL VISIT (OUTPATIENT)
Dept: FAMILY MEDICINE | Facility: CLINIC | Age: 68
End: 2023-01-06
Payer: COMMERCIAL

## 2023-01-06 ENCOUNTER — NURSE TRIAGE (OUTPATIENT)
Dept: FAMILY MEDICINE | Facility: CLINIC | Age: 68
End: 2023-01-06

## 2023-01-06 ENCOUNTER — LAB (OUTPATIENT)
Dept: URGENT CARE | Facility: URGENT CARE | Age: 68
End: 2023-01-06
Attending: PHYSICIAN ASSISTANT
Payer: COMMERCIAL

## 2023-01-06 DIAGNOSIS — R05.1 ACUTE COUGH: ICD-10-CM

## 2023-01-06 DIAGNOSIS — R05.1 ACUTE COUGH: Primary | ICD-10-CM

## 2023-01-06 LAB
FLUAV AG SPEC QL IA: NEGATIVE
FLUBV AG SPEC QL IA: NEGATIVE

## 2023-01-06 PROCEDURE — 99213 OFFICE O/P EST LOW 20 MIN: CPT | Mod: 95 | Performed by: PHYSICIAN ASSISTANT

## 2023-01-06 PROCEDURE — 87804 INFLUENZA ASSAY W/OPTIC: CPT

## 2023-01-06 RX ORDER — DOXYCYCLINE 100 MG/1
100 CAPSULE ORAL 2 TIMES DAILY
Qty: 14 CAPSULE | Refills: 0 | Status: SHIPPED | OUTPATIENT
Start: 2023-01-06 | End: 2023-01-13

## 2023-01-06 NOTE — TELEPHONE ENCOUNTER
Pt calling reporting persistent cough that started Sunday and will not go away , it wakes him at night.     Pt denies fevers  Reports Mild SOB  Denies chest pain    Denies feeling faint    See triage assessment below   Recommends to be seen in clinic today   Pt agrees to virtual visit today with Provider .     Placed on schedule with [facility] same day provider.     Encouraged to call back with following:    CALL BACK IF:  * Difficulty breathing  * Cough lasts more than 3 weeks  * Fever lasts more than 3 days  * You become worse Edit                                                               [] CALL BACK IF:  * Difficulty breathing occurs  * Fever lasts more than 3 days   * Nasal discharge lasts more than 10 days   * Cough lasts more than 3 weeks   * You become worse                               Pt encouraged to be evaluated sooner in UC/ED if sypmptoms become acutely worse or he develops chest pain/ SOB .     Geri Mathias RN  [facility]

## 2023-01-06 NOTE — TELEPHONE ENCOUNTER
"    Reason for Disposition    Continuous (nonstop) coughing interferes with work or school and no improvement using cough treatment per Care Advice    MILD difficulty breathing (e.g., minimal/no SOB at rest, SOB with walking, pulse <100) and still present when not coughing    Additional Information    Negative: Bluish (or gray) lips or face    Negative: SEVERE difficulty breathing (e.g., struggling for each breath, speaks in single words)    Negative: Rapid onset of cough and has hives    Negative: Coughing started suddenly after medicine, an allergic food or bee sting    Negative: Difficulty breathing after exposure to flames, smoke, or fumes    Negative: Sounds like a life-threatening emergency to the triager    Negative: Previous asthma attacks and this feels like asthma attack    Negative: Dry cough (non-productive; no sputum or minimal clear sputum) and within 14 days of COVID-19 Exposure    Negative: MODERATE difficulty breathing (e.g., speaks in phrases, SOB even at rest, pulse 100-120) and still present when not coughing    Negative: Chest pain present when not coughing    Negative: Passed out (i.e., fainted, collapsed and was not responding)    Negative: Patient sounds very sick or weak to the triager    Negative: Coughed up > 1 tablespoon (15 ml) blood (Exception: Blood-tinged sputum.)    Negative: Fever > 103 F (39.4 C)    Negative: Fever > 101 F (38.3 C) and over 60 years of age    Negative: Fever > 100.0 F (37.8 C) and has diabetes mellitus or a weak immune system (e.g., HIV positive, cancer chemotherapy, organ transplant, splenectomy, chronic steroids)    Negative: Fever > 100.0 F (37.8 C) and bedridden (e.g., nursing home patient, stroke, chronic illness, recovering from surgery)    Negative: Increasing ankle swelling    Negative: Wheezing is present    Answer Assessment - Initial Assessment Questions  1. ONSET: \"When did the cough begin?\"       Started Sunday   2. SEVERITY: \"How bad is the cough " "today?\"       Worse when laying down and trying to sleep coughing every 10 mins.   3. SPUTUM: \"Describe the color of your sputum\" (none, dry cough; clear, white, yellow, green)      Milky white  4. HEMOPTYSIS: \"Are you coughing up any blood?\" If so ask: \"How much?\" (flecks, streaks, tablespoons, etc.)      Denies   5. DIFFICULTY BREATHING: \"Are you having difficulty breathing?\" If Yes, ask: \"How bad is it?\" (e.g., mild, moderate, severe)     - MILD: No SOB at rest, mild SOB with walking, speaks normally in sentences, can lie down, no retractions, pulse < 100.     - MODERATE: SOB at rest, SOB with minimal exertion and prefers to sit, cannot lie down flat, speaks in phrases, mild retractions, audible wheezing, pulse 100-120.     - SEVERE: Very SOB at rest, speaks in single words, struggling to breathe, sitting hunched forward, retractions, pulse > 120       Mild SOB   6. FEVER: \"Do you have a fever?\" If Yes, ask: \"What is your temperature, how was it measured, and when did it start?\"      99.4 , this am   7. CARDIAC HISTORY: \"Do you have any history of heart disease?\" (e.g., heart attack, congestive heart failure)       Denies   8. LUNG HISTORY: \"Do you have any history of lung disease?\"  (e.g., pulmonary embolus, asthma, emphysema)      Denies   9. PE RISK FACTORS: \"Do you have a history of blood clots?\" (or: recent major surgery, recent prolonged travel, bedridden)      Denies   10. OTHER SYMPTOMS: \"Do you have any other symptoms?\" (e.g., runny nose, wheezing, chest pain)        Denies   11. PREGNANCY: \"Is there any chance you are pregnant?\" \"When was your last menstrual period?\"        Denies   12. TRAVEL: \"Have you traveled out of the country in the last month?\" (e.g., travel history, exposures)        Denies    Protocols used: COUGH-A-OH      "

## 2023-01-06 NOTE — PATIENT INSTRUCTIONS
Ney Arguello,    Thank you for allowing [facility] to manage your care.    I am unsure of the cause of your symptoms, but this sounds most consistent with a viral respiratory illness. We will see what our workup shows. Call [address] [place] [address] or your local [facility] to schedule a lab only visit for an influenza swab.    If you develop worsening/changing symptoms at any time, please call 911 or go to the emergency department for evaluation.    I sent your prescriptions to your pharmacy. Begin the doxycycline in 5 days if you are not improving to treat for possible bacterial infection. If you take this medication, please do not take your multivitamin or dairy 2 hours before/after the doxycycline.    Please allow 1-2 business days for our office to contact you in regards to your laboratory/radiological studies.  If not done so, I encourage you to login into [employer] ([email or web address] to review your results as well.     Drink 8-10 glasses of fluid daily to stay well-hydrated.    If you have any questions or concerns, please feel free to call us at [phone]    Sincerely,    Aurelio Campbell PA-C    Did you know?      You can schedule a video visit for follow-up appointments as well as future appointments for certain conditions.  Please see the below link.     [email or web address]    If you have not already done so,  I encourage you to sign up for [employer] ([email or web address]).  This will allow you to review your results, securely communicate with a provider, and schedule virtual visits as well.

## 2023-01-06 NOTE — PROGRESS NOTES
"Jos is a 67 year old who is being evaluated via a billable telephone visit.      What phone number would you like to be contacted at? [phone]  How would you like to obtain your AVS? Edith  [place] Location (provider location):  On-site    Assessment & Plan   Problem List Items Addressed This Visit    None  Visit Diagnoses     Acute cough    -  Primary    Relevant Medications    doxycycline hyclate (VIBRAMYCIN) 100 MG capsule    Other Relevant Orders    Influenza A & B Antigen (Completed)         Impression is likely viral URI. Flu negative and patient did a home COVID test today that was negative during our visit. Sounds well and non-toxic and I have low suspicion for impending airway obstruction or respiratory distress at this point.  He will push p.o. fluids, use over-the-counter meds for symptoms, complete a course of doxycycline if not improving in the next 5 days given duration of symptoms and follow-up with us in 1-2 weeks if not improving or urgent care/the ER if symptoms worsen/change at any time.    DDx and Dx discussed with and explained to the pt to their satisfaction.  All questions were answered at this time. Pt expressed understanding of and agreement with this dx, tx, and plan. No further workup warranted and standard medication warnings given. I have given the patient a list of pertinent indications for re-evaluation. Will go to the Emergency Department if symptoms worsen or new concerning symptoms arise. Patient left the call in no apparent distress.     Ordering of each unique test  Prescription drug management  18 minutes spent on the date of the encounter doing chart review, history and exam, documentation and further activities per the note     BMI:   Estimated body mass index is 33.48 kg/m  as calculated from the following:    Height as of 12/13/22: 1.84 m (6' 0.44\").    Weight as of 12/13/22: 113.4 kg (249 lb 14.4 oz).     See Patient Instructions    Return in about 2 weeks (around " 1/20/2023) for a recheck of your symptoms if not improving, or call 911/go to an ER anytime if worsening.    ARRON Benavides  [facility] LEILA Arguello is a 67 year old presenting for the following health issues:  Ent Problem      HPI     Acute Illness  Acute illness concerns: Chills, sinus pressure, runny nose, congestion, cough, wheezing, achiness  Onset/Duration: 1/1/23, neg COVID test 1/2/22  Symptoms:  Fever: No  Chills/Sweats: YES- chills  Headache (location?): No  Sinus Pressure: YES  Conjunctivitis:  No  Ear Pain: no  Rhinorrhea: YES  Congestion: YES  Sore Throat: No  Cough: YES-productive of milkish white sputum  Wheeze: YES  Decreased Appetite: No  Nausea: No  Vomiting: No  Diarrhea: No  Dysuria/Freq.: No  Dysuria or Hematuria: No  Fatigue/Achiness: YES- achiness  Sick/Strep Exposure: No  Therapies tried and outcome: Robitussin DM cough syrup, Conner's vaporizer    Review of Systems   Constitutional, HEENT, cardiovascular, pulmonary, gi and gu systems are negative, except as otherwise noted.      Objective           Vitals:  No vitals were obtained today due to virtual visit.    Physical Exam   healthy, alert and no distress  PSYCH: Alert and oriented times 3; coherent speech, normal   rate and volume, able to articulate logical thoughts, able   to abstract reason, no tangential thoughts, no hallucinations   or delusions  His affect is normal and pleasant  RESP: No cough, no audible wheezing, able to talk in full sentences  Remainder of exam unable to be completed due to telephone visits    Results for orders placed or performed in visit on 01/06/23   Influenza A & B Antigen     Status: Normal    Specimen: Nose; Swab   Result Value Ref Range    Influenza A antigen Negative Negative    Influenza B antigen Negative Negative    Narrative    Test results must be correlated with clinical data. If necessary, results should be confirmed by a molecular assay or viral culture.         Phone call duration: 13 minutes

## 2023-01-13 NOTE — TELEPHONE ENCOUNTER
[facility] Call Center    Phone Message    May a detailed message be left on voicemail: yes     Reason for Call: Other:     Pt is requesting a call back to discuss status of insurance issue.    Action Taken: Other: cardio    Travel Screening: Not Applicable       Thank you!  [facility]

## 2023-01-20 ENCOUNTER — MYC MEDICAL ADVICE (OUTPATIENT)
Dept: INTERNAL MEDICINE | Facility: CLINIC | Age: 68
End: 2023-01-20
Payer: COMMERCIAL

## 2023-01-20 DIAGNOSIS — G47.33 OSA (OBSTRUCTIVE SLEEP APNEA): Primary | ICD-10-CM

## 2023-01-20 NOTE — TELEPHONE ENCOUNTER
Please see [employer] message below.  Please advise if patient needs virtual visit to review this or referral to sleep clinic?    Odette Martinez RN  [facility]

## 2023-01-21 NOTE — TELEPHONE ENCOUNTER
Dr. Shannon Gutierrez    Can you speak to this patients Polysomnography (PSG) / sleep study ? I need an opinion if you need to start from square one or what you'd suggest. If you just need me to place an order for a consultation with a sleep disorder specialist  I will do that !    Cecilio Pro MD

## 2023-01-21 NOTE — TELEPHONE ENCOUNTER
Thang Pro,    It looks like he has severe obstructive sleep apnea with hypoxemia. I am not familiar with this vendor, but it does appear that the study was read by a sleep specialist.    Unfortunately it was scored using 3% rules, so he will need another study with 4% scoring if he wants Medicare to pay for treatment    If if you want us to manage his sleep apnea he will need a sleep consult to establish care.    David

## 2023-02-11 DIAGNOSIS — E11.42 CONTROLLED TYPE 2 DIABETES MELLITUS WITH DIABETIC POLYNEUROPATHY, WITHOUT LONG-TERM CURRENT USE OF INSULIN (H): ICD-10-CM

## 2023-02-13 RX ORDER — PEN NEEDLE, DIABETIC 32GX 5/32"
NEEDLE, DISPOSABLE MISCELLANEOUS
Qty: 100 EACH | Refills: 0 | Status: SHIPPED | OUTPATIENT
Start: 2023-02-13 | End: 2023-06-12

## 2023-03-06 ENCOUNTER — OFFICE VISIT (OUTPATIENT)
Dept: OPTOMETRY | Facility: CLINIC | Age: 68
End: 2023-03-06
Payer: COMMERCIAL

## 2023-03-06 DIAGNOSIS — H52.4 PRESBYOPIA: ICD-10-CM

## 2023-03-06 DIAGNOSIS — H52.13 MYOPIA OF BOTH EYES: ICD-10-CM

## 2023-03-06 DIAGNOSIS — Z46.0 CONTACT LENS/GLASSES FITTING: ICD-10-CM

## 2023-03-06 DIAGNOSIS — H25.13 NUCLEAR AGE-RELATED CATARACT, BOTH EYES: ICD-10-CM

## 2023-03-06 DIAGNOSIS — H52.223 REGULAR ASTIGMATISM OF BOTH EYES: ICD-10-CM

## 2023-03-06 DIAGNOSIS — Z01.01 ENCOUNTER FOR EXAMINATION OF EYES AND VISION WITH ABNORMAL FINDINGS: Primary | ICD-10-CM

## 2023-03-06 DIAGNOSIS — E11.9 TYPE 2 DIABETES MELLITUS WITHOUT RETINOPATHY (H): ICD-10-CM

## 2023-03-06 PROCEDURE — 92014 COMPRE OPH EXAM EST PT 1/>: CPT | Performed by: OPTOMETRIST

## 2023-03-06 PROCEDURE — 92310 CONTACT LENS FITTING OU: CPT | Mod: GA | Performed by: OPTOMETRIST

## 2023-03-06 PROCEDURE — 92015 DETERMINE REFRACTIVE STATE: CPT | Performed by: OPTOMETRIST

## 2023-03-06 ASSESSMENT — REFRACTION_MANIFEST
OS_AXIS: 134
OD_CYLINDER: +1.00
OS_CYLINDER: +1.25
OD_AXIS: 072
OD_SPHERE: -4.75
OD_ADD: +2.75
OS_SPHERE: -5.25
OS_ADD: +2.75

## 2023-03-06 ASSESSMENT — CONF VISUAL FIELD
OS_INFERIOR_TEMPORAL_RESTRICTION: 0
OS_SUPERIOR_TEMPORAL_RESTRICTION: 0
OS_SUPERIOR_NASAL_RESTRICTION: 0
OD_SUPERIOR_NASAL_RESTRICTION: 0
OD_SUPERIOR_TEMPORAL_RESTRICTION: 0
OD_NORMAL: 1
OS_NORMAL: 1
OD_INFERIOR_TEMPORAL_RESTRICTION: 0
OD_INFERIOR_NASAL_RESTRICTION: 0
OS_INFERIOR_NASAL_RESTRICTION: 0
METHOD: COUNTING FINGERS

## 2023-03-06 ASSESSMENT — VISUAL ACUITY
OD_CC+: -1
OS_CC: 20/40-2
METHOD: SNELLEN - LINEAR
OD_CC: 20/20
CORRECTION_TYPE: GLASSES
OD_SC: 20/20-1
OS_CC: 20/20
OD_CC: 20/20
OS_SC: 20/300
OS_CC+: -1
OD_SC: 20/300
OS_SC: 20/20-1

## 2023-03-06 ASSESSMENT — REFRACTION_WEARINGRX
OD_CYLINDER: +1.00
OD_AXIS: 064
SPECS_TYPE: PAL
OD_ADD: +2.75
OS_CYLINDER: +0.75
OS_AXIS: 131
OS_ADD: +2.75
OS_SPHERE: -4.75
OD_SPHERE: -4.75

## 2023-03-06 ASSESSMENT — REFRACTION_CURRENTRX
OD_CYLINDER: -0.75
OD_BRAND: COOPER BIOFINITY TORIC BC 8.7, D 14.5
OS_SPHERE: -4.25
OD_SPHERE: -3.75
OS_CYLINDER: -0.75
OS_AXIS: 020
OS_BRAND: COOPER BIOFINITY TORIC BC 8.7, D 14.5
OD_AXIS: 160

## 2023-03-06 ASSESSMENT — TONOMETRY
IOP_METHOD: APPLANATION
OD_IOP_MMHG: 13
OS_IOP_MMHG: 11

## 2023-03-06 ASSESSMENT — EXTERNAL EXAM - RIGHT EYE: OD_EXAM: NORMAL

## 2023-03-06 ASSESSMENT — SLIT LAMP EXAM - LIDS
COMMENTS: 1+ DERMATOCHALASIS
COMMENTS: 1+ DERMATOCHALASIS

## 2023-03-06 ASSESSMENT — CUP TO DISC RATIO
OD_RATIO: 0.45
OS_RATIO: 0.3

## 2023-03-06 ASSESSMENT — EXTERNAL EXAM - LEFT EYE: OS_EXAM: NORMAL

## 2023-03-06 NOTE — PATIENT INSTRUCTIONS
Patient educated on importance of good blood sugar control.  Letter sent to primary care provider with diabetic eye exam report.     You have the start of mild cataracts.  You may notice some blurred vision or glare with night driving.  It is important that you wear good sunglasses to protect your eyes from the ultraviolet light from the sun.     Updated glasses and contact lens prescriptions provided today.     Maximum wear-time of 12 hours/day of the contact lenses.   Clean the lenses nightly in contact lens solution.   No sleeping or swimming in the contact lenses.   Replace the lenses monthly.   Continue use of OTC reading glasses over contacts as needed.     Return in 1 year for a comprehensive eye exam, or sooner if needed.      The effects of the dilating drops last for 4- 6 hours.  You will be more sensitive to light and vision will be blurry up close.  Mydriatic sunglasses were given if needed.     Garry Nelson, HEVER  [facility]  [address] [place] [address]. [place]  [place]  [address]    [phone]

## 2023-03-06 NOTE — LETTER
3/6/2023         RE: Florian Lawrence  [address] [facility] [id number]        Dear Colleague,    Thank you for referring your patient, Florian Lawrence, to the [facility]. Please see a copy of my visit note below.    Chief Complaint   Patient presents with     Diabetic Eye Exam     New Eval For Contact Lens        Chief Complaint(s) and History of Present Illness(es)     Diabetic Eye Exam            Vision: fluctuates with blood sugars    Diabetes Type: Type 2, on insulin and taking oral medications    Duration: 24 years    Blood Sugars: fluctuates (Checks 2x/day)          New Eval For Contact Lens                Lab Results   Component Value Date    A1C 6.0 09/22/2022    A1C 6.7 02/14/2022    A1C 6.8 10/22/2021    A1C 7.2 12/18/2020    A1C 6.7 06/30/2020    A1C 8.1 03/25/2020    A1C 6.8 10/14/2019    A1C 7.1 06/20/2019       Previous contact lens wearer? Yes - Biofinity Toric each eye   Comfort of contact lenses : Good   Satisfied with current lenses: Yes - would like to update CL Rx and stick with same brand    -OK with $75 fitting fee     Last Eye Exam: 1/17/2022  Dilated Previously: Yes, side effects of dilation explained today    What are you currently using to see?  Glasses, +1.50 readers and contacts - wears CL's most of the time     Distance Vision Acuity: Satisfied with vision    Near Vision Acuity: Not satisfied - sees computer better with CL's and readers than he does with his PAL's    Eye Comfort: good  Do you use eye drops? : No  Occupation or Hobbies: Retired     [facility]     Medical, surgical and family histories reviewed and updated 3/6/2023.       OBJECTIVE: See Ophthalmology exam    ASSESSMENT:    ICD-10-CM    1. Encounter for examination of eyes and vision with abnormal findings  Z01.01       2. Type 2 diabetes mellitus without retinopathy (H)  E11.9       3. Nuclear age-related cataract, both eyes  H25.13       4. Myopia of both eyes  H52.13       5.  Regular astigmatism of both eyes  H52.223       6. Presbyopia  H52.4       7. Contact lens/glasses fitting  Z46.0           PLAN:    Florian Lawrence aware  eye exam results will be sent to [facility].  Patient Instructions   Patient educated on importance of good blood sugar control.  Letter sent to primary care provider with diabetic eye exam report.     You have the start of mild cataracts.  You may notice some blurred vision or glare with night driving.  It is important that you wear good sunglasses to protect your eyes from the ultraviolet light from the sun.     Updated glasses and contact lens prescriptions provided today.     Maximum wear-time of 12 hours/day of the contact lenses.   Clean the lenses nightly in contact lens solution.   No sleeping or swimming in the contact lenses.   Replace the lenses monthly.   Continue use of OTC reading glasses over contacts as needed.     Return in 1 year for a comprehensive eye exam, or sooner if needed.      The effects of the dilating drops last for 4- 6 hours.  You will be more sensitive to light and vision will be blurry up close.  Mydriatic sunglasses were given if needed.     Garry Nelson OD  [facility]  [address] [place] [address]. [place]  [address]    [phone]              Again, thank you for allowing me to participate in the care of your patient.        Sincerely,        Garry Nelson OD

## 2023-03-06 NOTE — PROGRESS NOTES
Chief Complaint   Patient presents with     Diabetic Eye Exam     New Eval For Contact Lens        Chief Complaint(s) and History of Present Illness(es)     Diabetic Eye Exam            Vision: fluctuates with blood sugars    Diabetes Type: Type 2, on insulin and taking oral medications    Duration: 24 years    Blood Sugars: fluctuates (Checks 2x/day)          New Eval For Contact Lens                Lab Results   Component Value Date    A1C 6.0 09/22/2022    A1C 6.7 02/14/2022    A1C 6.8 10/22/2021    A1C 7.2 12/18/2020    A1C 6.7 06/30/2020    A1C 8.1 03/25/2020    A1C 6.8 10/14/2019    A1C 7.1 06/20/2019       Previous contact lens wearer? Yes - Biofinity Toric each eye   Comfort of contact lenses : Good   Satisfied with current lenses: Yes - would like to update CL Rx and stick with same brand    -OK with $75 fitting fee     Last Eye Exam: 1/17/2022  Dilated Previously: Yes, side effects of dilation explained today    What are you currently using to see?  Glasses, +1.50 readers and contacts - wears CL's most of the time     Distance Vision Acuity: Satisfied with vision    Near Vision Acuity: Not satisfied - sees computer better with CL's and readers than he does with his PAL's    Eye Comfort: good  Do you use eye drops? : No  Occupation or Hobbies: Retired     [facility]     Medical, surgical and family histories reviewed and updated 3/6/2023.       OBJECTIVE: See Ophthalmology exam    ASSESSMENT:    ICD-10-CM    1. Encounter for examination of eyes and vision with abnormal findings  Z01.01       2. Type 2 diabetes mellitus without retinopathy (H)  E11.9       3. Nuclear age-related cataract, both eyes  H25.13       4. Myopia of both eyes  H52.13       5. Regular astigmatism of both eyes  H52.223       6. Presbyopia  H52.4       7. Contact lens/glasses fitting  Z46.0           PLAN:    Florian Lawrence aware  eye exam results will be sent to [facility].  Patient Instructions   Patient educated on  importance of good blood sugar control.  Letter sent to primary care provider with diabetic eye exam report.     You have the start of mild cataracts.  You may notice some blurred vision or glare with night driving.  It is important that you wear good sunglasses to protect your eyes from the ultraviolet light from the sun.     Updated glasses and contact lens prescriptions provided today.     Maximum wear-time of 12 hours/day of the contact lenses.   Clean the lenses nightly in contact lens solution.   No sleeping or swimming in the contact lenses.   Replace the lenses monthly.   Continue use of OTC reading glasses over contacts as needed.     Return in 1 year for a comprehensive eye exam, or sooner if needed.      The effects of the dilating drops last for 4- 6 hours.  You will be more sensitive to light and vision will be blurry up close.  Mydriatic sunglasses were given if needed.     Garry Nelson, HEVER  [facility]  [address] [place] [address]. [place]  [address]    [phone]

## 2023-03-16 DIAGNOSIS — E78.5 HYPERLIPIDEMIA LDL GOAL <100: ICD-10-CM

## 2023-03-16 RX ORDER — SIMVASTATIN 20 MG
20 TABLET ORAL DAILY
Qty: 60 TABLET | Refills: 0 | Status: SHIPPED | OUTPATIENT
Start: 2023-03-16 | End: 2023-05-08

## 2023-03-20 DIAGNOSIS — E78.5 HYPERLIPIDEMIA LDL GOAL <100: ICD-10-CM

## 2023-03-21 RX ORDER — SIMVASTATIN 20 MG
TABLET ORAL
Qty: 90 TABLET | OUTPATIENT
Start: 2023-03-21

## 2023-04-09 ENCOUNTER — HEALTH MAINTENANCE LETTER (OUTPATIENT)
Age: 68
End: 2023-04-09

## 2023-04-15 DIAGNOSIS — E11.42 CONTROLLED TYPE 2 DIABETES MELLITUS WITH DIABETIC POLYNEUROPATHY, WITHOUT LONG-TERM CURRENT USE OF INSULIN (H): ICD-10-CM

## 2023-04-18 DIAGNOSIS — E11.42 CONTROLLED TYPE 2 DIABETES MELLITUS WITH DIABETIC POLYNEUROPATHY, WITHOUT LONG-TERM CURRENT USE OF INSULIN (H): ICD-10-CM

## 2023-05-08 ENCOUNTER — OFFICE VISIT (OUTPATIENT)
Dept: INTERNAL MEDICINE | Facility: CLINIC | Age: 68
End: 2023-05-08
Payer: COMMERCIAL

## 2023-05-08 VITALS
BODY MASS INDEX: 33.05 KG/M2 | DIASTOLIC BLOOD PRESSURE: 77 MMHG | TEMPERATURE: 97.4 F | WEIGHT: 244 LBS | HEIGHT: 72 IN | RESPIRATION RATE: 18 BRPM | SYSTOLIC BLOOD PRESSURE: 138 MMHG | OXYGEN SATURATION: 97 % | HEART RATE: 65 BPM

## 2023-05-08 DIAGNOSIS — E78.5 HYPERLIPIDEMIA LDL GOAL <100: ICD-10-CM

## 2023-05-08 DIAGNOSIS — N18.1 CKD (CHRONIC KIDNEY DISEASE) STAGE 1, GFR 90 ML/MIN OR GREATER: ICD-10-CM

## 2023-05-08 DIAGNOSIS — Z00.00 ENCOUNTER FOR SUBSEQUENT ANNUAL WELLNESS VISIT (AWV) IN MEDICARE PATIENT: Primary | ICD-10-CM

## 2023-05-08 DIAGNOSIS — R06.09 DOE (DYSPNEA ON EXERTION): ICD-10-CM

## 2023-05-08 DIAGNOSIS — L30.9 ECZEMA, UNSPECIFIED TYPE: ICD-10-CM

## 2023-05-08 DIAGNOSIS — R31.0 GROSS HEMATURIA: ICD-10-CM

## 2023-05-08 DIAGNOSIS — I10 HYPERTENSION GOAL BP (BLOOD PRESSURE) < 140/90: ICD-10-CM

## 2023-05-08 DIAGNOSIS — E11.42 CONTROLLED TYPE 2 DIABETES MELLITUS WITH DIABETIC POLYNEUROPATHY, WITHOUT LONG-TERM CURRENT USE OF INSULIN (H): ICD-10-CM

## 2023-05-08 LAB
ALBUMIN UR-MCNC: 100 MG/DL
ANION GAP SERPL CALCULATED.3IONS-SCNC: 12 MMOL/L (ref 7–15)
APPEARANCE UR: CLEAR
BILIRUB UR QL STRIP: NEGATIVE
BUN SERPL-MCNC: 26.2 MG/DL (ref 8–23)
CALCIUM SERPL-MCNC: 9.5 MG/DL (ref 8.8–10.2)
CHLORIDE SERPL-SCNC: 105 MMOL/L (ref 98–107)
CHOLEST SERPL-MCNC: 122 MG/DL
COLOR UR AUTO: YELLOW
CREAT SERPL-MCNC: 1.19 MG/DL (ref 0.67–1.17)
DEPRECATED HCO3 PLAS-SCNC: 20 MMOL/L (ref 22–29)
GFR SERPL CREATININE-BSD FRML MDRD: 67 ML/MIN/1.73M2
GLUCOSE SERPL-MCNC: 167 MG/DL (ref 70–99)
GLUCOSE UR STRIP-MCNC: NEGATIVE MG/DL
HBA1C MFR BLD: 8.6 % (ref 0–5.6)
HDLC SERPL-MCNC: 29 MG/DL
HGB UR QL STRIP: ABNORMAL
KETONES UR STRIP-MCNC: NEGATIVE MG/DL
LDLC SERPL CALC-MCNC: 42 MG/DL
LEUKOCYTE ESTERASE UR QL STRIP: ABNORMAL
NITRATE UR QL: NEGATIVE
NONHDLC SERPL-MCNC: 93 MG/DL
PH UR STRIP: 5 [PH] (ref 5–7)
POTASSIUM SERPL-SCNC: 4.9 MMOL/L (ref 3.4–5.3)
RBC #/AREA URNS AUTO: ABNORMAL /HPF
SODIUM SERPL-SCNC: 137 MMOL/L (ref 136–145)
SP GR UR STRIP: 1.02 (ref 1–1.03)
SQUAMOUS #/AREA URNS AUTO: ABNORMAL /LPF
TRIGL SERPL-MCNC: 255 MG/DL
UROBILINOGEN UR STRIP-ACNC: 0.2 E.U./DL
WBC #/AREA URNS AUTO: ABNORMAL /HPF

## 2023-05-08 PROCEDURE — G0439 PPPS, SUBSEQ VISIT: HCPCS | Performed by: INTERNAL MEDICINE

## 2023-05-08 PROCEDURE — 99214 OFFICE O/P EST MOD 30 MIN: CPT | Mod: 25 | Performed by: INTERNAL MEDICINE

## 2023-05-08 PROCEDURE — 83036 HEMOGLOBIN GLYCOSYLATED A1C: CPT | Performed by: INTERNAL MEDICINE

## 2023-05-08 PROCEDURE — 36415 COLL VENOUS BLD VENIPUNCTURE: CPT | Performed by: INTERNAL MEDICINE

## 2023-05-08 PROCEDURE — 99207 PR FOOT EXAM NO CHARGE: CPT | Performed by: INTERNAL MEDICINE

## 2023-05-08 PROCEDURE — 81001 URINALYSIS AUTO W/SCOPE: CPT | Performed by: INTERNAL MEDICINE

## 2023-05-08 PROCEDURE — 80048 BASIC METABOLIC PNL TOTAL CA: CPT | Performed by: INTERNAL MEDICINE

## 2023-05-08 PROCEDURE — 80061 LIPID PANEL: CPT | Performed by: INTERNAL MEDICINE

## 2023-05-08 RX ORDER — INSULIN GLARGINE 100 [IU]/ML
65 INJECTION, SOLUTION SUBCUTANEOUS AT BEDTIME
Qty: 60 ML | Refills: 5 | Status: SHIPPED | OUTPATIENT
Start: 2023-05-08 | End: 2023-10-02

## 2023-05-08 RX ORDER — LOSARTAN POTASSIUM 25 MG/1
25 TABLET ORAL DAILY
Qty: 180 TABLET | Refills: 1 | Status: SHIPPED | OUTPATIENT
Start: 2023-05-08 | End: 2024-01-15

## 2023-05-08 RX ORDER — SIMVASTATIN 20 MG
20 TABLET ORAL DAILY
Qty: 90 TABLET | Refills: 1 | Status: SHIPPED | OUTPATIENT
Start: 2023-05-08 | End: 2023-12-12

## 2023-05-08 RX ORDER — GLIMEPIRIDE 4 MG/1
4 TABLET ORAL
Qty: 90 TABLET | Refills: 1 | Status: SHIPPED | OUTPATIENT
Start: 2023-05-08 | End: 2024-01-15

## 2023-05-08 RX ORDER — BETAMETHASONE DIPROPIONATE 0.5 MG/G
CREAM TOPICAL
Qty: 30 G | Refills: 3 | Status: SHIPPED | OUTPATIENT
Start: 2023-05-08

## 2023-05-08 ASSESSMENT — ENCOUNTER SYMPTOMS
CONSTIPATION: 0
COUGH: 1
HEADACHES: 0
DYSURIA: 0
ARTHRALGIAS: 0
PARESTHESIAS: 0
DIARRHEA: 0
EYE PAIN: 0
NAUSEA: 0
FREQUENCY: 0
PALPITATIONS: 0
CHILLS: 0
ABDOMINAL PAIN: 0
HEARTBURN: 0
HEMATOCHEZIA: 0
FEVER: 0
DIZZINESS: 0
JOINT SWELLING: 0
SORE THROAT: 0
HEMATURIA: 0
WEAKNESS: 0
MYALGIAS: 1
SHORTNESS OF BREATH: 1
NERVOUS/ANXIOUS: 0

## 2023-05-08 ASSESSMENT — ACTIVITIES OF DAILY LIVING (ADL): CURRENT_FUNCTION: NO ASSISTANCE NEEDED
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
## 2023-05-08 NOTE — Clinical Note
Hi, would appreciate sharing your thoughts with me. I saw this patient for appointment today and we reviewed his case. He has a dyspnea on exertion that has antibody anginal flavor to it, although no chest tightness or chest pain. It's a dyspnea on exertion. Patient wonders if he should have more testing. Apparently his health insurance  would not cover the CT coronary angiogram as you had ordered it. What do you think ? Cecilio Pro MD

## 2023-05-08 NOTE — Clinical Note
I reviewed this case with Dr. Valladares who recommended we try a stress echocardiogram . I have ordered this. Please review with patient , Reroute if additional input requested from me   Cecilio Pro MD

## 2023-05-09 ENCOUNTER — TELEPHONE (OUTPATIENT)
Dept: INTERNAL MEDICINE | Facility: CLINIC | Age: 68
End: 2023-05-09
Payer: COMMERCIAL

## 2023-05-09 DIAGNOSIS — R07.9 CHEST PAIN: Primary | ICD-10-CM

## 2023-05-09 NOTE — TELEPHONE ENCOUNTER
Cecilio Pro MD  [facility] Rn Triage Pool  I reviewed this case with Dr. Valladares who recommended we try a stress echocardiogram . I have ordered this. Please review with patient , Reroute if additional input requested from me     Cecilio Pro MD

## 2023-05-10 NOTE — TELEPHONE ENCOUNTER
Called patient and relayed information, patient states that he is scheduled for stress test this week, no further questions.    ARIA RodriguezN RN  [facility]

## 2023-05-12 ENCOUNTER — HOSPITAL ENCOUNTER (OUTPATIENT)
Dept: CARDIOLOGY | Facility: CLINIC | Age: 68
Discharge: HOME | End: 2023-05-12
Attending: INTERNAL MEDICINE | Admitting: INTERNAL MEDICINE
Payer: COMMERCIAL

## 2023-05-12 DIAGNOSIS — R07.9 CHEST PAIN: ICD-10-CM

## 2023-05-12 PROCEDURE — 93018 CV STRESS TEST I&R ONLY: CPT | Performed by: INTERNAL MEDICINE

## 2023-05-12 PROCEDURE — 93350 STRESS TTE ONLY: CPT | Mod: 26 | Performed by: INTERNAL MEDICINE

## 2023-05-12 PROCEDURE — 93321 DOPPLER ECHO F-UP/LMTD STD: CPT | Mod: 26 | Performed by: INTERNAL MEDICINE

## 2023-05-12 PROCEDURE — 93325 DOPPLER ECHO COLOR FLOW MAPG: CPT | Mod: 26 | Performed by: INTERNAL MEDICINE

## 2023-05-12 PROCEDURE — 93321 DOPPLER ECHO F-UP/LMTD STD: CPT | Mod: TC

## 2023-05-12 PROCEDURE — 93016 CV STRESS TEST SUPVJ ONLY: CPT | Performed by: INTERNAL MEDICINE

## 2023-05-12 PROCEDURE — 93325 DOPPLER ECHO COLOR FLOW MAPG: CPT | Mod: TC

## 2023-05-12 PROCEDURE — 255N000002 HC RX 255 OP 636: Performed by: INTERNAL MEDICINE

## 2023-05-12 RX ADMIN — PERFLUTREN 5 ML: 6.52 INJECTION, SUSPENSION INTRAVENOUS at 10:29

## 2023-05-15 ENCOUNTER — ANCILLARY PROCEDURE (OUTPATIENT)
Dept: ULTRASOUND IMAGING | Facility: CLINIC | Age: 68
End: 2023-05-15
Attending: INTERNAL MEDICINE
Payer: COMMERCIAL

## 2023-05-15 DIAGNOSIS — R31.0 GROSS HEMATURIA: ICD-10-CM

## 2023-05-15 PROCEDURE — 76770 US EXAM ABDO BACK WALL COMP: CPT | Mod: TC | Performed by: RADIOLOGY

## 2023-05-25 PROBLEM — G47.33 OSA (OBSTRUCTIVE SLEEP APNEA): Chronic | Status: ACTIVE | Noted: 2023-05-25

## 2023-05-26 ENCOUNTER — OFFICE VISIT (OUTPATIENT)
Dept: SLEEP MEDICINE | Facility: CLINIC | Age: 68
End: 2023-05-26
Attending: INTERNAL MEDICINE
Payer: COMMERCIAL

## 2023-05-26 ENCOUNTER — MYC MEDICAL ADVICE (OUTPATIENT)
Dept: SLEEP MEDICINE | Facility: CLINIC | Age: 68
End: 2023-05-26

## 2023-05-26 VITALS
WEIGHT: 241.4 LBS | HEIGHT: 72 IN | SYSTOLIC BLOOD PRESSURE: 104 MMHG | DIASTOLIC BLOOD PRESSURE: 63 MMHG | OXYGEN SATURATION: 95 % | RESPIRATION RATE: 16 BRPM | HEART RATE: 76 BPM | BODY MASS INDEX: 32.7 KG/M2

## 2023-05-26 DIAGNOSIS — G47.33 OSA (OBSTRUCTIVE SLEEP APNEA): Primary | ICD-10-CM

## 2023-05-26 DIAGNOSIS — R09.02 HYPOXEMIA: Primary | ICD-10-CM

## 2023-05-26 DIAGNOSIS — G47.33 OSA (OBSTRUCTIVE SLEEP APNEA): ICD-10-CM

## 2023-05-26 PROBLEM — K21.9 GASTROESOPHAGEAL REFLUX DISEASE: Chronic | Status: ACTIVE | Noted: 2017-06-09

## 2023-05-26 PROCEDURE — 99204 OFFICE O/P NEW MOD 45 MIN: CPT | Performed by: PHYSICIAN ASSISTANT

## 2023-05-26 ASSESSMENT — SLEEP AND FATIGUE QUESTIONNAIRES
HOW LIKELY ARE YOU TO NOD OFF OR FALL ASLEEP WHILE WATCHING TV: MODERATE CHANCE OF DOZING
HOW LIKELY ARE YOU TO NOD OFF OR FALL ASLEEP WHILE LYING DOWN TO REST IN THE AFTERNOON WHEN CIRCUMSTANCES PERMIT: WOULD NEVER DOZE
HOW LIKELY ARE YOU TO NOD OFF OR FALL ASLEEP WHILE SITTING INACTIVE IN A PUBLIC PLACE: MODERATE CHANCE OF DOZING
HOW LIKELY ARE YOU TO NOD OFF OR FALL ASLEEP WHILE SITTING QUIETLY AFTER LUNCH WITHOUT ALCOHOL: WOULD NEVER DOZE
HOW LIKELY ARE YOU TO NOD OFF OR FALL ASLEEP WHEN YOU ARE A PASSENGER IN A CAR FOR AN HOUR WITHOUT A BREAK: WOULD NEVER DOZE
HOW LIKELY ARE YOU TO NOD OFF OR FALL ASLEEP WHILE SITTING AND READING: MODERATE CHANCE OF DOZING
HOW LIKELY ARE YOU TO NOD OFF OR FALL ASLEEP IN A CAR, WHILE STOPPED FOR A FEW MINUTES IN TRAFFIC: WOULD NEVER DOZE
HOW LIKELY ARE YOU TO NOD OFF OR FALL ASLEEP WHILE SITTING AND TALKING TO SOMEONE: WOULD NEVER DOZE

## 2023-05-26 NOTE — NURSING NOTE
DME orders have been automatically faxed to [facility].  WatchPAT mail-out to be done on CPAP was scheduled as well as follow-up appointment with provider. Information for WatchPAT Home Sleep Test sent to patient via My Chart.  Scarlett Grijalva, CMA

## 2023-05-26 NOTE — PATIENT INSTRUCTIONS
You will be provided with an auto-titrating CPAP with a pressure range of 6-16 cm with heated humidity to limit nasal congestion. Adjust the heat level on humidifier to find a setting that prevents dry nose but does not cause condensation in the hose or mask. Use distilled water in the humidifier.  The CPAP has a ramp function that starts the pressure lower than your prescribed pressure and gradually increases it over a number of minutes.  This may make it easier to fall asleep.    Try to use the CPAP every-night, all night (minimum of 4 hours). Many insurances require that we prove you are using the CPAP at least 4 hours on at least 70% of nights over a 30 day period. We have 90 days to meet those criteria.            Discussed weight management and the impact of weight gain on sleep apnea.  Let me know if you snore or feel the pressure is too high.    You can get new supplies (mask, hose and filter) for your CPAP every 3-6 months, covered by insurance. You do not need to get supplies that often, but they are available if you would like them.  You may exchange the mask once within the first month if you feel the initial mask does not fit well.  Contact your medical equipment provider for equipment issues.  Please let me know if you have any return of snoring, daytime sleepiness or poor sleep quality. We will want to make sure your CPAP is adequately treating your apnea.  There is a website called CPAP.com that has accessories that may make CPAP use easier. Please visit it at your convenience.      [facility] [phone]     Follow-up 2 month.  Bring your CPAP machine with you to the follow up appointment.
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
## 2023-05-26 NOTE — NURSING NOTE
"Chief Complaint   Patient presents with     Sleep Problem     Referred by Dr. Pro as his dentist did a HST and it was positive for TASHA       Initial /63   Pulse 76   Resp 16   Ht 1.822 m (5' 11.75\")   Wt 109.5 kg (241 lb 6.4 oz)   SpO2 95%   BMI 32.97 kg/m   Estimated body mass index is 32.97 kg/m  as calculated from the following:    Height as of this encounter: 1.822 m (5' 11.75\").    Weight as of this encounter: 109.5 kg (241 lb 6.4 oz).    Medication Reconciliation: complete  ESS: 6  Neck circumference: 18 inches / 46 centimeters.  DME: N/A  Scarlett Grijalva CMA      "

## 2023-05-31 NOTE — TELEPHONE ENCOUNTER
Corine Miranda, PT sent to Elke Lofton PA [facility] [place],   We will need  a sleep study that is scored at 4% and a sleep consult before the study.  I was unable to find the scoring value (but appears to be 3%).  I saw in your progress note that you are mailing a HST for the  patient.  Could you also do a new HST qualifier?  He has [employer] Med Adv so we would be able to serve her PAP device.   Let me know.   Corine        Plan:  HST order placed. See if he can go without CPAP for 2-3 night prior to testing.   Please notify the patient and facilitate testing and follow up.

## 2023-06-01 ENCOUNTER — TRANSFERRED RECORDS (OUTPATIENT)
Dept: MULTI SPECIALTY CLINIC | Facility: CLINIC | Age: 68
End: 2023-06-01

## 2023-06-01 LAB — RETINOPATHY: NORMAL

## 2023-06-09 DIAGNOSIS — E11.42 CONTROLLED TYPE 2 DIABETES MELLITUS WITH DIABETIC POLYNEUROPATHY, WITHOUT LONG-TERM CURRENT USE OF INSULIN (H): ICD-10-CM

## 2023-06-12 RX ORDER — PEN NEEDLE, DIABETIC 32GX 5/32"
NEEDLE, DISPOSABLE MISCELLANEOUS
Qty: 100 EACH | Refills: 0 | Status: SHIPPED | OUTPATIENT
Start: 2023-06-12 | End: 2023-09-14

## 2023-08-07 ENCOUNTER — LAB (OUTPATIENT)
Dept: LAB | Facility: CLINIC | Age: 68
End: 2023-08-07
Payer: COMMERCIAL

## 2023-08-07 DIAGNOSIS — E11.42 CONTROLLED TYPE 2 DIABETES MELLITUS WITH DIABETIC POLYNEUROPATHY, WITHOUT LONG-TERM CURRENT USE OF INSULIN (H): ICD-10-CM

## 2023-08-07 LAB — HBA1C MFR BLD: 6.1 % (ref 0–5.6)

## 2023-08-07 PROCEDURE — 36415 COLL VENOUS BLD VENIPUNCTURE: CPT

## 2023-08-07 PROCEDURE — 83036 HEMOGLOBIN GLYCOSYLATED A1C: CPT

## 2023-08-24 DIAGNOSIS — E11.42 CONTROLLED TYPE 2 DIABETES MELLITUS WITH DIABETIC POLYNEUROPATHY, WITHOUT LONG-TERM CURRENT USE OF INSULIN (H): ICD-10-CM

## 2023-09-13 DIAGNOSIS — E11.42 CONTROLLED TYPE 2 DIABETES MELLITUS WITH DIABETIC POLYNEUROPATHY, WITHOUT LONG-TERM CURRENT USE OF INSULIN (H): ICD-10-CM

## 2023-09-14 RX ORDER — PEN NEEDLE, DIABETIC 32GX 5/32"
NEEDLE, DISPOSABLE MISCELLANEOUS
Qty: 100 EACH | Refills: 1 | Status: SHIPPED | OUTPATIENT
Start: 2023-09-14 | End: 2024-04-02

## 2023-09-21 DIAGNOSIS — E11.42 CONTROLLED TYPE 2 DIABETES MELLITUS WITH DIABETIC POLYNEUROPATHY, WITHOUT LONG-TERM CURRENT USE OF INSULIN (H): ICD-10-CM

## 2023-10-02 DIAGNOSIS — E11.42 CONTROLLED TYPE 2 DIABETES MELLITUS WITH DIABETIC POLYNEUROPATHY, WITHOUT LONG-TERM CURRENT USE OF INSULIN (H): ICD-10-CM

## 2023-10-02 RX ORDER — INSULIN GLARGINE 100 [IU]/ML
INJECTION, SOLUTION SUBCUTANEOUS
Qty: 60 ML | Refills: 5 | Status: SHIPPED | OUTPATIENT
Start: 2023-10-02 | End: 2024-07-29

## 2023-11-19 ENCOUNTER — HEALTH MAINTENANCE LETTER (OUTPATIENT)
Age: 68
End: 2023-11-19

## 2023-11-24 DIAGNOSIS — E11.42 CONTROLLED TYPE 2 DIABETES MELLITUS WITH DIABETIC POLYNEUROPATHY, WITHOUT LONG-TERM CURRENT USE OF INSULIN (H): ICD-10-CM

## 2023-12-12 DIAGNOSIS — E78.5 HYPERLIPIDEMIA LDL GOAL <100: ICD-10-CM

## 2023-12-12 RX ORDER — SIMVASTATIN 20 MG
20 TABLET ORAL DAILY
Qty: 90 TABLET | Refills: 1 | Status: SHIPPED | OUTPATIENT
Start: 2023-12-12 | End: 2024-06-20

## 2023-12-13 NOTE — PROGRESS NOTES
"     Answers for HPI/ROS submitted by the patient on 9/22/2022  In general, how would you rate your overall physical health?: excellent  Frequency of exercise:: 2-3 days/week  Do you usually eat at least 4 servings of fruit and vegetables a day, include whole grains & fiber, and avoid regularly eating high fat or \"junk\" foods? : Yes  Taking medications regularly:: Yes  Medication side effects:: None  Activities of Daily Living: no assistance needed  Home safety: no safety concerns identified  Hearing Impairment:: no hearing concerns  In the past 6 months, have you been bothered by leaking of urine?: No  abdominal pain: No  Blood in stool: No  Blood in urine: No  chest pain: Yes  chills: No  congestion: No  constipation: No  cough: No  diarrhea: No  dizziness: No  ear pain: Yes  eye pain: No  nervous/anxious: No  fever: No  frequency: No  genital sores: No  headaches: No  hearing loss: No  heartburn: No  arthralgias: No  joint swelling: No  peripheral edema: No  mood changes: No  myalgias: No  nausea: No  dysuria: No  palpitations: No  Skin sensation changes: No  sore throat: No  urgency: No  rash: No  shortness of breath: Yes  visual disturbance: No  weakness: No  impotence: No  penile discharge: No  In general, how would you rate your overall mental or emotional health?: excellent  Additional concerns today:: Yes  Duration of exercise:: 15-30 minutes      " negative normal/ROM intact/normal gait/strength 5/5 bilateral upper extremities/strength 5/5 bilateral lower extremities

## 2023-12-18 DIAGNOSIS — H10.33 ACUTE CONJUNCTIVITIS OF BOTH EYES, UNSPECIFIED ACUTE CONJUNCTIVITIS TYPE: Primary | ICD-10-CM

## 2023-12-18 PROCEDURE — 99207 PR NO CHARGE LOS: CPT | Performed by: OPTOMETRIST

## 2023-12-18 RX ORDER — POLYMYXIN B SULFATE AND TRIMETHOPRIM 1; 10000 MG/ML; [USP'U]/ML
1 SOLUTION OPHTHALMIC 4 TIMES DAILY
Qty: 10 ML | Refills: 0 | Status: SHIPPED | OUTPATIENT
Start: 2023-12-18 | End: 2024-07-29

## 2023-12-18 NOTE — PROGRESS NOTES
Discharge, redness, eyelids matted shut in the morning for the past few days.   Begin PolyTrim eyedrops 4x daily in each eye.     I will call to follow-up in 2-3 days.       Garry Nelson, [address] [place]  [address]    [phone]

## 2023-12-21 ENCOUNTER — VIRTUAL VISIT (OUTPATIENT)
Dept: FAMILY MEDICINE | Facility: OTHER | Age: 68
End: 2023-12-21
Payer: COMMERCIAL

## 2023-12-21 DIAGNOSIS — Z53.9 NO SHOW: Primary | ICD-10-CM

## 2023-12-21 PROCEDURE — 99207 PR NO CHARGE LOS: CPT | Mod: VID | Performed by: PHYSICIAN ASSISTANT

## 2023-12-21 NOTE — PROGRESS NOTES
"    Instructions Relayed to Patient by Virtual Roomer:     Patient is active on [employer]:   Relayed following to patient: \"It looks like you are active on [employer], are you able to join the visit this way? If not, do you need us to send you a link now or would you like your provider to send a link via text or email when they are ready to initiate the visit?\"    Reminded patient to ensure they were logged on to virtual visit by arrival time listed. Documented in appointment notes if patient had flexibility to initiate visit sooner than arrival time. If pediatric virtual visit, ensured pediatric patient along with parent/guardian will be present for video visit.     Patient offered the website [email or web address] and/or phone number to [employer] Help line: [phone]      Jos is a 68 year old who is being evaluated via a billable video visit.      How would you like to obtain your AVS? [employer]  If the video visit is dropped, the invitation should be resent by: Text to cell phone: [phone]  Will anyone else be joining your video visit? No      Assessment & Plan     NO SHOW  Two attempts were made to begin visit with the patient. No connection or response from patient. At this time he is considered no show. He can reach out to reschedule at his convenience.     ANA Hoang [facility]   Jos is a 68 year old, presenting for the following health issues:  Pharyngitis        12/21/2023    10:49 AM   Additional Questions   Roomed by scott       History of Present Illness       Reason for visit:  Strep symptom    He eats 4 or more servings of fruits and vegetables daily.He consumes 0 sweetened beverage(s) daily.He exercises with enough effort to increase his heart rate 30 to 60 minutes per day.  He exercises with enough effort to increase his heart rate 6 days per week.   He is taking medications regularly.       Acute Illness  Acute illness concerns: " Strep  Onset/Duration: Late Tuesday  Symptoms:  Fever: No  Chills/Sweats: No  Headache (location?): No  Sinus Pressure: No  Conjunctivitis:  Yes, pt currently taking a eye drops that was prescribed by provider  Ear Pain: no  Rhinorrhea: YES  Congestion: No  Sore Throat: YES  Cough: no  Wheeze: No  Decreased Appetite: No  Nausea: No  Vomiting: No  Diarrhea: No  Dysuria/Freq.: No  Dysuria or Hematuria: No  Fatigue/Achiness: No  Sick/Strep Exposure: No  Therapies tried and outcome: None      Review of Systems   Constitutional, HEENT, cardiovascular, pulmonary, gi and gu systems are negative, except as otherwise noted.      Objective           Vitals:  No vitals were obtained today due to virtual visit.    Physical Exam   No show          Video-Visit Details    Type of service:  Video Visit     1st attempt: Unsuccessful  Last text sent11:41 AM To:[phone]  Last email sent11:41 AM To:[email or web address]  Joined the call at 12/21/2023, 11:42:07 am.  Left the call at 12/21/2023, 11:52:22 am.  You were on the call for 10 minutes 14 seconds .    2nd attempt: Unsuccessful  Last text sent11:52 AM To:[phone]  Last email sent11:52 AM To:[email or web address]  Joined the call at 12/21/2023, 11:52:59 am.  Left the call at 12/21/2023, 12:03:15 pm.  You were on the call for 10 minutes 15 seconds .    Originating Location (pt. Location): Home    Distant Location (provider location):  On-site  Platform used for Video Visit: [employer]

## 2023-12-22 ENCOUNTER — OFFICE VISIT (OUTPATIENT)
Dept: FAMILY MEDICINE | Facility: CLINIC | Age: 68
End: 2023-12-22
Payer: COMMERCIAL

## 2023-12-22 VITALS
SYSTOLIC BLOOD PRESSURE: 121 MMHG | DIASTOLIC BLOOD PRESSURE: 67 MMHG | HEIGHT: 72 IN | RESPIRATION RATE: 16 BRPM | WEIGHT: 248.25 LBS | HEART RATE: 91 BPM | BODY MASS INDEX: 33.62 KG/M2 | OXYGEN SATURATION: 96 % | TEMPERATURE: 97.6 F

## 2023-12-22 DIAGNOSIS — H10.30 ACUTE CONJUNCTIVITIS, UNSPECIFIED ACUTE CONJUNCTIVITIS TYPE, UNSPECIFIED LATERALITY: ICD-10-CM

## 2023-12-22 DIAGNOSIS — J01.90 ACUTE SINUSITIS, RECURRENCE NOT SPECIFIED, UNSPECIFIED LOCATION: Primary | ICD-10-CM

## 2023-12-22 PROCEDURE — 99213 OFFICE O/P EST LOW 20 MIN: CPT | Performed by: FAMILY MEDICINE

## 2023-12-22 RX ORDER — DOXYCYCLINE 100 MG/1
100 CAPSULE ORAL 2 TIMES DAILY
Qty: 20 CAPSULE | Refills: 0 | Status: SHIPPED | OUTPATIENT
Start: 2023-12-22 | End: 2024-01-01

## 2023-12-22 RX ORDER — RESPIRATORY SYNCYTIAL VIRUS VACCINE 120MCG/0.5
0.5 KIT INTRAMUSCULAR ONCE
Qty: 1 EACH | Refills: 0 | Status: CANCELLED | OUTPATIENT
Start: 2023-12-22 | End: 2023-12-22

## 2023-12-22 ASSESSMENT — PAIN SCALES - GENERAL: PAINLEVEL: MILD PAIN (2)

## 2023-12-22 NOTE — PROGRESS NOTES
"      Subjective   Jos is a 68 year old, presenting for the following health issues:  Throat Pain (Since Tuesday)        12/22/2023     2:03 PM   Additional Questions   Roomed by Chelsey Terry       History of Present Illness       Reason for visit:  Strep symptom    He eats 4 or more servings of fruits and vegetables daily.He consumes 0 sweetened beverage(s) daily.He exercises with enough effort to increase his heart rate 30 to 60 minutes per day.  He exercises with enough effort to increase his heart rate 6 days per week.   He is taking medications regularly.             Review of Systems   Started eye drops for eye infection 4 days ago, eyes better    Throat is irritated but not as bad as two days ago  Hurts to swallow     No sick contacts    No change in meds recently    Occasional over the counter antihistamine    No fever    More head congestion    Chest clear     Not short of breath        Objective    /67 (BP Location: Left arm, Patient Position: Chair, Cuff Size: Adult Large)   Pulse 91   Temp 97.6  F (36.4  C) (Temporal)   Resp 16   Ht 1.822 m (5' 11.75\")   Wt 112.6 kg (248 lb 4 oz)   SpO2 96%   BMI 33.90 kg/m    Body mass index is 33.9 kg/m .  Physical Exam  Constitutional:       Appearance: He is well-developed.   HENT:      Head: Normocephalic and atraumatic.      Right Ear: Tympanic membrane, ear canal and external ear normal.      Left Ear: Tympanic membrane, ear canal and external ear normal.      Nose:      Comments: Some irritated nasal mucosa with fairly large amount of colored drainage present bilat       Mouth/Throat:      Mouth: Mucous membranes are moist.      Pharynx: Oropharynx is clear.   Eyes:      Conjunctiva/sclera: Conjunctivae normal.   Neck:      Vascular: No carotid bruit.   Cardiovascular:      Rate and Rhythm: Normal rate and regular rhythm.      Heart sounds: Normal heart sounds.   Pulmonary:      Effort: Pulmonary effort is normal. No respiratory distress.      " Breath sounds: Normal breath sounds.   Neurological:      Mental Status: He is alert and oriented to person, place, and time.      Cranial Nerves: No cranial nerve deficit.   Psychiatric:         Speech: Speech normal.         Behavior: Behavior normal.      Radials symmetric     No edema    ASSESSMENT / PLAN:  (J01.90) Acute sinusitis, recurrence not specified, unspecified location  (primary encounter diagnosis)  Comment: sinus infection, discussed viral vs bacterial. The throat symptoms are due to drainage.   Plan: doxycycline hyclate (VIBRAMYCIN) 100 MG capsule        Hold prescription for now, fill if symptoms worsen/ don't resolve    (H10.30) Acute conjunctivitis, unspecified acute conjunctivitis type, unspecified laterality  Comment: improving with prescription drops   Plan: finish these       I reviewed the patient's medications, allergies, medical history, family history, and social history.    Andre Palacio MD

## 2023-12-22 NOTE — PATIENT INSTRUCTIONS
Increase fluid intake    Use plain mucinex ( guafenesin ) to help thin the mucus    Hold prescription for antibiotic, fill if symptoms worsen/ don't improve    Finish out prescription eye drop    Follow up as needed based on symptoms     Be seen promptly if symptoms get real severe

## 2024-01-15 ENCOUNTER — OFFICE VISIT (OUTPATIENT)
Dept: INTERNAL MEDICINE | Facility: CLINIC | Age: 69
End: 2024-01-15
Payer: COMMERCIAL

## 2024-01-15 VITALS
HEART RATE: 80 BPM | TEMPERATURE: 98 F | WEIGHT: 250 LBS | SYSTOLIC BLOOD PRESSURE: 130 MMHG | BODY MASS INDEX: 34.14 KG/M2 | DIASTOLIC BLOOD PRESSURE: 76 MMHG | RESPIRATION RATE: 16 BRPM | OXYGEN SATURATION: 95 %

## 2024-01-15 DIAGNOSIS — Z00.00 ENCOUNTER FOR MEDICARE ANNUAL WELLNESS EXAM: Primary | ICD-10-CM

## 2024-01-15 DIAGNOSIS — Z23 NEED FOR SHINGLES VACCINE: ICD-10-CM

## 2024-01-15 DIAGNOSIS — Z29.11 NEED FOR PROPHYLACTIC VACCINATION AND INOCULATION AGAINST RESPIRATORY SYNCYTIAL VIRUS (RSV): ICD-10-CM

## 2024-01-15 DIAGNOSIS — Z23 NEEDS FLU SHOT: ICD-10-CM

## 2024-01-15 DIAGNOSIS — E78.5 HYPERLIPIDEMIA LDL GOAL <100: ICD-10-CM

## 2024-01-15 DIAGNOSIS — N18.1 CKD (CHRONIC KIDNEY DISEASE) STAGE 1, GFR 90 ML/MIN OR GREATER: ICD-10-CM

## 2024-01-15 DIAGNOSIS — Z12.5 SCREENING PSA (PROSTATE SPECIFIC ANTIGEN): ICD-10-CM

## 2024-01-15 DIAGNOSIS — K21.00 GASTROESOPHAGEAL REFLUX DISEASE WITH ESOPHAGITIS WITHOUT HEMORRHAGE: ICD-10-CM

## 2024-01-15 DIAGNOSIS — I10 HYPERTENSION GOAL BP (BLOOD PRESSURE) < 140/90: ICD-10-CM

## 2024-01-15 DIAGNOSIS — E11.42 CONTROLLED TYPE 2 DIABETES MELLITUS WITH DIABETIC POLYNEUROPATHY, WITHOUT LONG-TERM CURRENT USE OF INSULIN (H): ICD-10-CM

## 2024-01-15 PROBLEM — I72.0 CAROTID ANEURYSM, RIGHT (H): Status: RESOLVED | Noted: 2022-09-22 | Resolved: 2024-01-15

## 2024-01-15 LAB
ERYTHROCYTE [DISTWIDTH] IN BLOOD BY AUTOMATED COUNT: 13.3 % (ref 10–15)
HBA1C MFR BLD: 6.4 % (ref 0–5.6)
HBA1C MFR BLD: 6.5 % (ref 0–5.6)
HCT VFR BLD AUTO: 38.3 % (ref 40–53)
HGB BLD-MCNC: 13 G/DL (ref 13.3–17.7)
MCH RBC QN AUTO: 30.1 PG (ref 26.5–33)
MCHC RBC AUTO-ENTMCNC: 33.9 G/DL (ref 31.5–36.5)
MCV RBC AUTO: 89 FL (ref 78–100)
PLATELET # BLD AUTO: 232 10E3/UL (ref 150–450)
RBC # BLD AUTO: 4.32 10E6/UL (ref 4.4–5.9)
WBC # BLD AUTO: 6.5 10E3/UL (ref 4–11)

## 2024-01-15 PROCEDURE — 85027 COMPLETE CBC AUTOMATED: CPT | Performed by: INTERNAL MEDICINE

## 2024-01-15 PROCEDURE — 80048 BASIC METABOLIC PNL TOTAL CA: CPT | Performed by: INTERNAL MEDICINE

## 2024-01-15 PROCEDURE — 80061 LIPID PANEL: CPT | Performed by: INTERNAL MEDICINE

## 2024-01-15 PROCEDURE — 90662 IIV NO PRSV INCREASED AG IM: CPT | Performed by: INTERNAL MEDICINE

## 2024-01-15 PROCEDURE — 83721 ASSAY OF BLOOD LIPOPROTEIN: CPT | Mod: 59 | Performed by: INTERNAL MEDICINE

## 2024-01-15 PROCEDURE — G0008 ADMIN INFLUENZA VIRUS VAC: HCPCS | Performed by: INTERNAL MEDICINE

## 2024-01-15 PROCEDURE — G0103 PSA SCREENING: HCPCS | Performed by: INTERNAL MEDICINE

## 2024-01-15 PROCEDURE — 82043 UR ALBUMIN QUANTITATIVE: CPT | Performed by: INTERNAL MEDICINE

## 2024-01-15 PROCEDURE — 99213 OFFICE O/P EST LOW 20 MIN: CPT | Mod: 25 | Performed by: INTERNAL MEDICINE

## 2024-01-15 PROCEDURE — 36415 COLL VENOUS BLD VENIPUNCTURE: CPT | Performed by: INTERNAL MEDICINE

## 2024-01-15 PROCEDURE — 99397 PER PM REEVAL EST PAT 65+ YR: CPT | Mod: 25 | Performed by: INTERNAL MEDICINE

## 2024-01-15 PROCEDURE — 83036 HEMOGLOBIN GLYCOSYLATED A1C: CPT | Performed by: INTERNAL MEDICINE

## 2024-01-15 PROCEDURE — 82570 ASSAY OF URINE CREATININE: CPT | Performed by: INTERNAL MEDICINE

## 2024-01-15 RX ORDER — GLIMEPIRIDE 4 MG/1
4 TABLET ORAL
Qty: 90 TABLET | Refills: 1 | Status: SHIPPED | OUTPATIENT
Start: 2024-01-15 | End: 2024-07-29

## 2024-01-15 RX ORDER — RESPIRATORY SYNCYTIAL VIRUS VACCINE 120MCG/0.5
0.5 KIT INTRAMUSCULAR ONCE
Qty: 1 EACH | Refills: 0 | Status: CANCELLED | OUTPATIENT
Start: 2024-01-15 | End: 2024-01-15

## 2024-01-15 RX ORDER — LOSARTAN POTASSIUM 25 MG/1
25 TABLET ORAL DAILY
Qty: 180 TABLET | Refills: 1 | Status: SHIPPED | OUTPATIENT
Start: 2024-01-15 | End: 2024-07-29

## 2024-01-15 ASSESSMENT — ENCOUNTER SYMPTOMS
COUGH: 0
PARESTHESIAS: 0
SHORTNESS OF BREATH: 0
ABDOMINAL PAIN: 0
ARTHRALGIAS: 0
MYALGIAS: 0
DYSURIA: 0
DIZZINESS: 0
HEARTBURN: 0
EYE PAIN: 0
FEVER: 0
DIARRHEA: 0
HEMATURIA: 0
WEAKNESS: 0
CHILLS: 0
NAUSEA: 0
HEMATOCHEZIA: 0
FREQUENCY: 0
HEADACHES: 0
PALPITATIONS: 0
CONSTIPATION: 0
NERVOUS/ANXIOUS: 0
SORE THROAT: 0
JOINT SWELLING: 0

## 2024-01-15 ASSESSMENT — ACTIVITIES OF DAILY LIVING (ADL): CURRENT_FUNCTION: NO ASSISTANCE NEEDED
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
## 2024-01-15 NOTE — PATIENT INSTRUCTIONS
Patient Education   Personalized Prevention Plan  You are due for the preventive services outlined below.  Your care team is available to assist you in scheduling these services.  If you have already completed any of these items, please share that information with your care team to update in your medical record.  Health Maintenance Due   Topic Date Due    Zoster (Shingles) Vaccine (1 of 2) Never done    RSV VACCINE (Pregnancy & 60+) (1 - 1-dose 60+ series) Never done    Flu Vaccine (1) 09/01/2023    COVID-19 Vaccine (4 - 2023-24 season) 09/01/2023    Kidney Microalbumin Urine Test  09/22/2023    ANNUAL REVIEW OF HM ORDERS  09/22/2023    A1C Lab  11/07/2023      We reviewed your gastroenterologic issues and I suspect gastroesophageal reflux disease . Treatment is an empirical trial of  Omeprazole [ Prilosec ] 20 milligrams every single day . You should  experience less or even resolved symptoms within the next month , if this  does not happen we will consider further steps including possibly a esphagogastroduodenoscopy

## 2024-01-15 NOTE — PROGRESS NOTES
{PROVIDER CHARTING PREFERENCE:859813}    Subjective   Jos is a 68 year old, presenting for the following health issues:  Diabetes  {(!) Visit Details have not yet been documented.  Please enter Visit Details and then use this list to pull in documentation. (Optional):652516}    HPI     {MA/LPN/RN Pre-Provider Visit Orders- hCG/UA/Strep (Optional):476282}  {SUPERLIST (Optional):023106}  {additonal problems for provider to add (Optional):420755}      Review of Systems   {ROS COMP (Optional):952958}      Objective    There were no vitals taken for this visit.  There is no height or weight on file to calculate BMI.  Physical Exam   {Exam List (Optional):959902}    {Diagnostic Test Results (Optional):149920}    {AMBULATORY ATTESTATION (Optional):952754}

## 2024-01-15 NOTE — LETTER
January 17, 2024    Jos Lawrence  [address] [facility] [address]          Dear ,    We are writing to inform you of your test results.   Normal Results  These labs show us the following things ;    1. Albumin random urine quantitative is elevated although only to a mild degree. This is something we monitor one time a year. I don't think this is worthy of any significant concern at this point  2. Fasting lipid panel numbers are ok  3. Basic metabolic panel shows essentially the same as 8 months ago and is ok.  4. Hemoglobin a1c  [ diabetes test ] is excellent  5. Complete blood count is also ok. You have a minuscule amount of anemia but I think this is your gaby and is not worthy of any special concern at this point, you are current with the colonoscopy and this is a stable minute finding  6. prostate specific antigen is within normal limits      Please let me know if you have any questions for me about all of these lab tests. Ta      Resulted Orders   HEMOGLOBIN A1C   Result Value Ref Range    Hemoglobin A1C 6.5 (H) 0.0 - 5.6 %      Comment:      Normal <5.7%   Prediabetes 5.7-6.4%    Diabetes 6.5% or higher     Note: Adopted from ADA consensus guidelines.   Lipid panel reflex to direct LDL Fasting   Result Value Ref Range    Cholesterol 148 <200 mg/dL    Triglycerides 530 (H) <150 mg/dL    Direct Measure HDL 27 (L) >=40 mg/dL    LDL Cholesterol Calculated        Comment:      Cannot estimate LDL when triglyceride exceeds 400 mg/dL    Non HDL Cholesterol 121 <130 mg/dL    Patient Fasting > 8hrs? Unknown     Narrative    Cholesterol  Desirable:  <200 mg/dL    Triglycerides  Normal:  Less than 150 mg/dL  Borderline High:  150-199 mg/dL  High:  200-499 mg/dL  Very High:  Greater than or equal to 500 mg/dL    Direct Measure HDL  Female:  Greater than or equal to 50 mg/dL   Male:  Greater than or equal to 40 mg/dL    LDL Cholesterol  Desirable:  <100mg/dL  Above Desirable:  100-129 mg/dL    Borderline High:  130-159 mg/dL   High:  160-189 mg/dL   Very High:  >= 190 mg/dL    Non HDL Cholesterol  Desirable:  130 mg/dL  Above Desirable:  130-159 mg/dL  Borderline High:  160-189 mg/dL  High:  190-219 mg/dL  Very High:  Greater than or equal to 220 mg/dL   Basic metabolic panel  (Ca, Cl, CO2, Creat, Gluc, K, Na, BUN)   Result Value Ref Range    Sodium 138 135 - 145 mmol/L      Comment:      Reference intervals for this test were updated on 09/26/2023 to more accurately reflect our healthy population. There may be differences in the flagging of prior results with similar values performed with this method. Interpretation of those prior results can be made in the context of the updated reference intervals.     Potassium 4.6 3.4 - 5.3 mmol/L    Chloride 104 98 - 107 mmol/L    Carbon Dioxide (CO2) 22 22 - 29 mmol/L    Anion Gap 12 7 - 15 mmol/L    Urea Nitrogen 26.1 (H) 8.0 - 23.0 mg/dL    Creatinine 1.26 (H) 0.67 - 1.17 mg/dL    GFR Estimate 62 >60 mL/min/1.73m2    Calcium 9.3 8.8 - 10.2 mg/dL    Glucose 187 (H) 70 - 99 mg/dL   CBC with platelets   Result Value Ref Range    WBC Count 6.5 4.0 - 11.0 10e3/uL    RBC Count 4.32 (L) 4.40 - 5.90 10e6/uL    Hemoglobin 13.0 (L) 13.3 - 17.7 g/dL    Hematocrit 38.3 (L) 40.0 - 53.0 %    MCV 89 78 - 100 fL    MCH 30.1 26.5 - 33.0 pg    MCHC 33.9 31.5 - 36.5 g/dL    RDW 13.3 10.0 - 15.0 %    Platelet Count 232 150 - 450 10e3/uL   PSA, screen   Result Value Ref Range    Prostate Specific Antigen Screen 0.36 0.00 - 4.50 ng/mL    Narrative    This result is obtained using the Roche Elecsys total PSA method on the sravani e801 immunoassay analyzer. Results obtained with different assay methods or kits cannot be used interchangeably.   Albumin Random Urine Quantitative with Creat Ratio   Result Value Ref Range    Creatinine Urine mg/dL 173.0 mg/dL      Comment:      The reference ranges have not been established in urine creatinine. The results should be integrated into the  clinical context for interpretation.    Albumin Urine mg/L 722.0 mg/L      Comment:      The reference ranges have not been established in urine albumin. The results should be integrated into the clinical context for interpretation.    Albumin Urine mg/g Cr 417.34 (H) 0.00 - 17.00 mg/g Cr      Comment:      Microalbuminuria is defined as an albumin:creatinine ratio of 17 to 299 for males and 25 to 299 for females. A ratio of albumin:creatinine of 300 or higher is indicative of overt proteinuria.  Due to biologic variability, positive results should be confirmed by a second, first-morning random or 24-hour timed urine specimen. If there is discrepancy, a third specimen is recommended. When 2 out of 3 results are in the microalbuminuria range, this is evidence for incipient nephropathy and warrants increased efforts at glucose control, blood pressure control, and institution of therapy with an angiotensin-converting-enzyme (ACE) inhibitor (if the patient can tolerate it).     Hemoglobin A1c   Result Value Ref Range    Hemoglobin A1C 6.4 (H) 0.0 - 5.6 %      Comment:      Normal <5.7%   Prediabetes 5.7-6.4%    Diabetes 6.5% or higher     Note: Adopted from ADA consensus guidelines.   LDL cholesterol direct   Result Value Ref Range    LDL Cholesterol Direct 68 <100 mg/dL      Comment:      Age 2-19 years:  Desirable: 0-110 mg/dL   Borderline high: 110-129 mg/dL   High: >= 130 mg/dL    Age 20 years and older:  Desirable: <100mg/dL  Above desirable: 100-129 mg/dL   Borderline high: 130-159 mg/dL   High: 160-189 mg/dL   Very high: >= 190 mg/dL       If you have any questions or concerns, please call the clinic at the number listed above.       Sincerely,      Cecilio Pro MD

## 2024-01-16 LAB
ANION GAP SERPL CALCULATED.3IONS-SCNC: 12 MMOL/L (ref 7–15)
BUN SERPL-MCNC: 26.1 MG/DL (ref 8–23)
CALCIUM SERPL-MCNC: 9.3 MG/DL (ref 8.8–10.2)
CHLORIDE SERPL-SCNC: 104 MMOL/L (ref 98–107)
CHOLEST SERPL-MCNC: 148 MG/DL
CREAT SERPL-MCNC: 1.26 MG/DL (ref 0.67–1.17)
CREAT UR-MCNC: 173 MG/DL
DEPRECATED HCO3 PLAS-SCNC: 22 MMOL/L (ref 22–29)
EGFRCR SERPLBLD CKD-EPI 2021: 62 ML/MIN/1.73M2
FASTING STATUS PATIENT QL REPORTED: ABNORMAL
GLUCOSE SERPL-MCNC: 187 MG/DL (ref 70–99)
HDLC SERPL-MCNC: 27 MG/DL
LDLC SERPL CALC-MCNC: ABNORMAL MG/DL
LDLC SERPL DIRECT ASSAY-MCNC: 68 MG/DL
MICROALBUMIN UR-MCNC: 722 MG/L
MICROALBUMIN/CREAT UR: 417.34 MG/G CR (ref 0–17)
NONHDLC SERPL-MCNC: 121 MG/DL
POTASSIUM SERPL-SCNC: 4.6 MMOL/L (ref 3.4–5.3)
PSA SERPL DL<=0.01 NG/ML-MCNC: 0.36 NG/ML (ref 0–4.5)
SODIUM SERPL-SCNC: 138 MMOL/L (ref 135–145)
TRIGL SERPL-MCNC: 530 MG/DL

## 2024-03-09 ENCOUNTER — MYC MEDICAL ADVICE (OUTPATIENT)
Dept: INTERNAL MEDICINE | Facility: CLINIC | Age: 69
End: 2024-03-09
Payer: COMMERCIAL

## 2024-04-02 DIAGNOSIS — E11.42 CONTROLLED TYPE 2 DIABETES MELLITUS WITH DIABETIC POLYNEUROPATHY, WITHOUT LONG-TERM CURRENT USE OF INSULIN (H): ICD-10-CM

## 2024-04-02 RX ORDER — PEN NEEDLE, DIABETIC 32GX 5/32"
NEEDLE, DISPOSABLE MISCELLANEOUS
Qty: 100 EACH | Refills: 0 | Status: SHIPPED | OUTPATIENT
Start: 2024-04-02 | End: 2024-07-15

## 2024-05-13 ENCOUNTER — ANCILLARY PROCEDURE (OUTPATIENT)
Dept: GENERAL RADIOLOGY | Facility: CLINIC | Age: 69
End: 2024-05-13
Attending: PEDIATRICS
Payer: COMMERCIAL

## 2024-05-13 ENCOUNTER — OFFICE VISIT (OUTPATIENT)
Dept: ORTHOPEDICS | Facility: CLINIC | Age: 69
End: 2024-05-13
Payer: COMMERCIAL

## 2024-05-13 DIAGNOSIS — M25.551 RIGHT HIP PAIN: Primary | ICD-10-CM

## 2024-05-13 DIAGNOSIS — M25.551 RIGHT HIP PAIN: ICD-10-CM

## 2024-05-13 DIAGNOSIS — Z96.641 STATUS POST RIGHT HIP REPLACEMENT: ICD-10-CM

## 2024-05-13 PROCEDURE — 99204 OFFICE O/P NEW MOD 45 MIN: CPT | Performed by: PEDIATRICS

## 2024-05-13 PROCEDURE — 73502 X-RAY EXAM HIP UNI 2-3 VIEWS: CPT | Mod: TC | Performed by: RADIOLOGY

## 2024-05-13 NOTE — PROGRESS NOTES
ASSESSMENT & PLAN    Jos was seen today for pain.    Diagnoses and all orders for this visit:    Right hip pain  -     XR Pelvis w Hip RT 1 View; Future    Status post right hip replacement      This issue is acute on chronic and Unchanged.      ICD-10-CM    1. Right hip pain  M25.551 XR Pelvis w Hip RT 1 View      2. Status post right hip replacement  Z96.641         Patient Instructions   We discussed these other possible diagnosis: Acute right hip pain s/p replacement. No injury.  - Reviewed x-rays with orthopedic surgery, Dr. Rand's partner who recommended clinic follow up. Discussed supportive care in the interim.    Plan:  - Today's Plan of Care:  Cane or crutches for limited weight bearing  Discussed activity considerations and other supportive care including Ice/Heat, OTC and other topical medications as needed.    Follow Up: with orthopedic surgery     Concerning signs and symptoms were reviewed and all questions were answered at this time.    Saira Davis MD [facility]  Sports Medicine Physician  [facility]    -----  Chief Complaint   Patient presents with    Right Hip - Pain       SUBJECTIVE  Florian Lawrence is a/an 69 year old male who is seen as a WALK IN patient for evaluation of right hip pain.     The patient is seen by themselves.    Onset: 2 day(s) ago. Reports insidious onset without acute precipitating event. Started after a round of golf.   Location of Pain: right groin pain  Worsened by: sit to stand, hip motion  Better with: standing   Treatments tried: ibuprofen   Associated symptoms: Denies numbness and tingling. No fever or chills or erythema    Orthopedic/Surgical history: YES - Date: R JO ANN - 10/28/2019. Did mention he has had 3 episodes of pain and complications after his replacement  Social History/Occupation: retired      REVIEW OF SYSTEMS:  Review of Systems    OBJECTIVE:  There were no vitals taken for this visit.   General: healthy, alert and in no distress  Skin: no  suspicious lesions or rash.  CV: distal perfusion intact   Resp: normal respiratory effort without conversational dyspnea   Psych: normal mood and affect  Gait: antalgic and cane  Neuro: Normal light sensory exam of lower extremity    Right hip exam    Inspection:      no edema or ecchymosis in hip area    Tender:      none right    Non Tender:      remainder of the hip area right    ROM:     Range of motion limited by pain with flexion, internal and external rotation    Strength:      Decreased strength due to pain    Sensation:      grossly intact in hip and thigh    Special Tests:      positive (+) TIFFANIE right       positive (+) FADIR right      RADIOLOGY:  Final results and radiologist's interpretation, available in the [facility] record.  Images were reviewed with the patient in the office today.  My personal interpretation of the performed imaging:     AP Pelvis and right lateral XR views of hip reviewed: total hip replacement, no acute fracture, heterotopic ossification noted around the joint, no significant degenerative change  - will follow official read    Review of prior external note(s) from - PCP, Triage, Prior Ortho notes  Review of the result(s) of each unique test - XR  Discussion of management or test interpretation with external physician/other qualified healthcare professional/appropriate source - Orthopedic Surgery Team

## 2024-05-13 NOTE — LETTER
5/13/2024         RE: Florian Lawrence  [address] [address]        Dear Colleague,    Thank you for referring your patient, Florian Lawrence, to the [facility]. Please see a copy of my visit note below.    ASSESSMENT & PLAN    Jos was seen today for pain.    Diagnoses and all orders for this visit:    Right hip pain  -     XR Pelvis w Hip RT 1 View; Future    Status post right hip replacement      This issue is acute on chronic and Unchanged.      ICD-10-CM    1. Right hip pain  M25.551 XR Pelvis w Hip RT 1 View      2. Status post right hip replacement  Z96.641         Patient Instructions   We discussed these other possible diagnosis: Acute right hip pain s/p replacement. No injury.  - Reviewed x-rays with orthopedic surgery, Dr. Rand's partner who recommended clinic follow up. Discussed supportive care in the interim.    Plan:  - Today's Plan of Care:  Cane or crutches for limited weight bearing  Discussed activity considerations and other supportive care including Ice/Heat, OTC and other topical medications as needed.    Follow Up: with orthopedic surgery     Concerning signs and symptoms were reviewed and all questions were answered at this time.    Saira Davis MD [facility]  Sports Medicine Physician  [facility]    -----  Chief Complaint   Patient presents with     Right Hip - Pain       SUBJECTIVE  Florian Lawrence is a/an 69 year old male who is seen as a WALK IN patient for evaluation of right hip pain.     The patient is seen by themselves.    Onset: 2 day(s) ago. Reports insidious onset without acute precipitating event. Started after a round of golf.   Location of Pain: right groin pain  Worsened by: sit to stand, hip motion  Better with: standing   Treatments tried: ibuprofen   Associated symptoms: Denies numbness and tingling.    Orthopedic/Surgical history: YES - Date: R JO ANN - 10/28/2019. Did mention he has had 3  episodes of pain and complications after his replacement  Social History/Occupation: retired      REVIEW OF SYSTEMS:  Review of Systems    OBJECTIVE:  There were no vitals taken for this visit.   General: healthy, alert and in no distress  Skin: no suspicious lesions or rash.  CV: distal perfusion intact   Resp: normal respiratory effort without conversational dyspnea   Psych: normal mood and affect  Gait: antalgic and cane  Neuro: Normal light sensory exam of lower extremity    Right hip exam    Inspection:      no edema or ecchymosis in hip area    Tender:      none right    Non Tender:      remainder of the hip area right    ROM:     Range of motion limited by pain with flexion, internal and external rotation    Strength:      Decreased strength due to pain    Sensation:      grossly intact in hip and thigh    Special Tests:      positive (+) TIFFANIE right       positive (+) FADIR right      RADIOLOGY:  Final results and radiologist's interpretation, available in the [facility] health record.  Images were reviewed with the patient in the office today.  My personal interpretation of the performed imaging:     AP Pelvis and right lateral XR views of hip reviewed: total hip replacement, no acute fracture, heterotopic ossification noted around the joint, no significant degenerative change  - will follow official read    Review of prior external note(s) from - PCP, Triage, Prior Ortho notes  Review of the result(s) of each unique test - XR  Discussion of management or test interpretation with external physician/other qualified healthcare professional/appropriate source - Orthopedic Surgery Team               Again, thank you for allowing me to participate in the care of your patient.        Sincerely,        Saira Davis MD

## 2024-05-13 NOTE — PATIENT INSTRUCTIONS
We discussed these other possible diagnosis: Acute right hip pain s/p replacement. No injury.  - Reviewed x-rays with orthopedic surgery.    Plan:  - Today's Plan of Care:  Cane or crutches for limited weight bearing  Discussed activity considerations and other supportive care including Ice/Heat, OTC and other topical medications as needed.    Follow Up: with orthopedic surgery    If you have any further questions for your physician or physician s care team you can call [phone] and use option 3 to leave a voice message.

## 2024-05-16 DIAGNOSIS — E11.42 CONTROLLED TYPE 2 DIABETES MELLITUS WITH DIABETIC POLYNEUROPATHY, WITHOUT LONG-TERM CURRENT USE OF INSULIN (H): ICD-10-CM
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
## 2024-06-04 ENCOUNTER — OFFICE VISIT (OUTPATIENT)
Dept: ORTHOPEDICS | Facility: CLINIC | Age: 69
End: 2024-06-04
Payer: COMMERCIAL

## 2024-06-04 VITALS
SYSTOLIC BLOOD PRESSURE: 135 MMHG | HEIGHT: 72 IN | HEART RATE: 81 BPM | BODY MASS INDEX: 33.86 KG/M2 | WEIGHT: 250 LBS | DIASTOLIC BLOOD PRESSURE: 69 MMHG | OXYGEN SATURATION: 95 %

## 2024-06-04 DIAGNOSIS — M25.559 CHRONIC HIP PAIN AFTER TOTAL REPLACEMENT OF HIP JOINT: ICD-10-CM

## 2024-06-04 DIAGNOSIS — Z96.649 CHRONIC HIP PAIN AFTER TOTAL REPLACEMENT OF HIP JOINT: ICD-10-CM

## 2024-06-04 DIAGNOSIS — Z96.641 STATUS POST TOTAL REPLACEMENT OF RIGHT HIP: Primary | ICD-10-CM

## 2024-06-04 DIAGNOSIS — G89.29 CHRONIC HIP PAIN AFTER TOTAL REPLACEMENT OF HIP JOINT: ICD-10-CM

## 2024-06-04 LAB
BASOPHILS # BLD AUTO: 0 10E3/UL (ref 0–0.2)
BASOPHILS NFR BLD AUTO: 1 %
EOSINOPHIL # BLD AUTO: 0.2 10E3/UL (ref 0–0.7)
EOSINOPHIL NFR BLD AUTO: 3 %
ERYTHROCYTE [DISTWIDTH] IN BLOOD BY AUTOMATED COUNT: 13.3 % (ref 10–15)
ERYTHROCYTE [SEDIMENTATION RATE] IN BLOOD BY WESTERGREN METHOD: 19 MM/HR (ref 0–20)
HCT VFR BLD AUTO: 36.9 % (ref 40–53)
HGB BLD-MCNC: 12.4 G/DL (ref 13.3–17.7)
IMM GRANULOCYTES # BLD: 0 10E3/UL
IMM GRANULOCYTES NFR BLD: 0 %
LYMPHOCYTES # BLD AUTO: 0.9 10E3/UL (ref 0.8–5.3)
LYMPHOCYTES NFR BLD AUTO: 13 %
MCH RBC QN AUTO: 29.7 PG (ref 26.5–33)
MCHC RBC AUTO-ENTMCNC: 33.6 G/DL (ref 31.5–36.5)
MCV RBC AUTO: 89 FL (ref 78–100)
MONOCYTES # BLD AUTO: 0.5 10E3/UL (ref 0–1.3)
MONOCYTES NFR BLD AUTO: 7 %
NEUTROPHILS # BLD AUTO: 5.2 10E3/UL (ref 1.6–8.3)
NEUTROPHILS NFR BLD AUTO: 77 %
PLATELET # BLD AUTO: 333 10E3/UL (ref 150–450)
RBC # BLD AUTO: 4.17 10E6/UL (ref 4.4–5.9)
WBC # BLD AUTO: 6.8 10E3/UL (ref 4–11)

## 2024-06-04 PROCEDURE — 36415 COLL VENOUS BLD VENIPUNCTURE: CPT | Performed by: ORTHOPAEDIC SURGERY

## 2024-06-04 PROCEDURE — 85025 COMPLETE CBC W/AUTO DIFF WBC: CPT | Performed by: ORTHOPAEDIC SURGERY

## 2024-06-04 PROCEDURE — 86200 CCP ANTIBODY: CPT | Performed by: ORTHOPAEDIC SURGERY

## 2024-06-04 PROCEDURE — 84550 ASSAY OF BLOOD/URIC ACID: CPT | Performed by: ORTHOPAEDIC SURGERY

## 2024-06-04 PROCEDURE — 99213 OFFICE O/P EST LOW 20 MIN: CPT | Performed by: ORTHOPAEDIC SURGERY

## 2024-06-04 PROCEDURE — 86140 C-REACTIVE PROTEIN: CPT | Performed by: ORTHOPAEDIC SURGERY

## 2024-06-04 PROCEDURE — 85652 RBC SED RATE AUTOMATED: CPT | Performed by: ORTHOPAEDIC SURGERY

## 2024-06-04 ASSESSMENT — PAIN SCALES - GENERAL: PAINLEVEL: NO PAIN (1)
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
## 2024-06-05 ENCOUNTER — PATIENT OUTREACH (OUTPATIENT)
Dept: GASTROENTEROLOGY | Facility: CLINIC | Age: 69
End: 2024-06-05
Payer: COMMERCIAL

## 2024-06-05 LAB
CCP AB SER IA-ACNC: 1.9 U/ML
CRP SERPL-MCNC: 5.39 MG/L
URATE SERPL-MCNC: 8.3 MG/DL (ref 3.4–7)

## 2024-06-10 ENCOUNTER — VIRTUAL VISIT (OUTPATIENT)
Dept: INTERNAL MEDICINE | Facility: CLINIC | Age: 69
End: 2024-06-10
Payer: COMMERCIAL

## 2024-06-10 DIAGNOSIS — E79.0 HYPERURICEMIA: Primary | ICD-10-CM

## 2024-06-10 DIAGNOSIS — E11.42 CONTROLLED TYPE 2 DIABETES MELLITUS WITH DIABETIC POLYNEUROPATHY, WITHOUT LONG-TERM CURRENT USE OF INSULIN (H): ICD-10-CM

## 2024-06-10 DIAGNOSIS — I20.89 STABLE ANGINA PECTORIS (H): ICD-10-CM

## 2024-06-10 PROCEDURE — 99442 PR PHYSICIAN TELEPHONE EVALUATION 11-20 MIN: CPT | Mod: 93 | Performed by: INTERNAL MEDICINE

## 2024-06-10 RX ORDER — ALLOPURINOL 300 MG/1
300 TABLET ORAL DAILY
Qty: 90 TABLET | Refills: 3 | Status: SHIPPED | OUTPATIENT
Start: 2024-06-10

## 2024-06-10 ASSESSMENT — ENCOUNTER SYMPTOMS: HIP PAIN: 1

## 2024-06-10 NOTE — Clinical Note
Very interesting - it is indeed quite possible in my opinion that his acute severe hip pain attacks are actually gout attacks . I started him on Zyloprim (allopurinol) and will get his uric acid level down to below 5 and lets cross our fingers and hope for NO more such flare-ups

## 2024-06-10 NOTE — PROGRESS NOTES
Jos is a 69 year old who is being evaluated via a billable telephone visit.    What phone number would you like to be contacted at? [phone]  How would you like to obtain your AVS? Edith  Originating Location (pt. Location): Home    Distant Location (provider location):  On-site    Assessment & Plan     Controlled type 2 diabetes mellitus with diabetic polyneuropathy, without long-term current use of insulin (H)  Lab Results   Component Value Date    A1C 6.5 01/15/2024    A1C 6.4 01/15/2024    A1C 6.1 08/07/2023    A1C 8.6 05/08/2023    A1C 6.0 09/22/2022    A1C 7.2 12/18/2020    A1C 6.7 06/30/2020    A1C 8.1 03/25/2020    A1C 6.8 10/14/2019    A1C 7.1 06/20/2019     Stable phase of chronic illness , problem is stable and ongoing monitoring      Stable angina pectoris (H24)  Problem is stable and ongoing monitoring      Hyperuricemia  The primary reason for this call is an interesting situation. Mr. Lawrence has had a total hip replacement back in October of 2019. Since then he has mostly done well. His total hip replacement status is felt to be fine. The one fly in the ointment here is now a total of 3 separate events in which patient developed a spontaneous and severe pain with this hip. So much so he can hardly walk during these flare-ups. He'd been given a workup for possible hip joint infection but ultimately Dr. Curry is questioning this diagnosis. Patient has had three different distinct episodes where this almost stereotypical fashion , severe joint pain flare-up seems to actually follow the clinical outlines of a gout flare-ups. So a uric acid level  was checked and lo and behold this is quite high at 8.3 and so this lends further support to the idea that what he has been having is actually gout flare-ups. While this is just a little bit unusual , especially given the fact that he denies any prior history of gout attacks with his toes or anywhere else, nevertheless we are justified to start Zyloprim  "(allopurinol) with the goal of pushing his uric acid level  down to below 5. At this point it becomes a waiting game. If he has no more flare-ups of this hip pain, as detailed above, then we will continue the Zyloprim (allopurinol) long term. If he has recurrence of these hip symptoms even with a lowered uric acid level  we would need to rethink this diagnosis  .  - allopurinol (ZYLOPRIM) 300 MG tablet; Take 1 tablet (300 mg) by mouth daily          Subjective   Jos is a 69 year old, presenting for the following health issues:  Results (Lab results /) and Hip Pain    11:18 AM start time  11:34 AM end time     Patient is \"ok\" but his total hip replacement October 2019 , right side has some issues. After  this total hip replacement he thought he might have a infection but this ultimately did not bear out. His hip starts to flare-up hurting again and again. This is clearly a pain that comes and goes . He gets flare-ups , with a fast and hard kick in so much he can hardly move, and the pain gradually lingers  and resolves over 2-3 weeks. This is had no relationship with any particular physical activity and seems to be spontaneous onset    Spring 2020 fall 2020 and then mother day weekend  2024   Patient has no history of gout flare-ups in the past otherwise   Patient himself has never had anything resembling gout with his toes.  Patient recalls no history of gout in his family either    Hemoglobin   Date Value Ref Range Status   06/04/2024 12.4 (L) 13.3 - 17.7 g/dL Final   01/15/2024 13.0 (L) 13.3 - 17.7 g/dL Final   01/11/2021 14.2 13.3 - 17.7 g/dL Final   08/13/2020 12.8 (L) 13.3 - 17.7 g/dL Final              6/10/2024    10:43 AM   Additional Questions   Roomed by alanis       Via the Health Maintenance questionnaire, the patient has reported the following services have been completed -Eye Exam: [facility] 2023-06-01, this information has been sent to the abstraction team.  Hip Pain    History of Present " Illness       Reason for visit:  Hip pain    He eats 2-3 servings of fruits and vegetables daily.He consumes 0 sweetened beverage(s) daily.He exercises with enough effort to increase his heart rate 10 to 19 minutes per day.  He exercises with enough effort to increase his heart rate 4 days per week.   He is taking medications regularly.         Review of Systems  Constitutional, HEENT, cardiovascular, pulmonary, gi and gu systems are negative, except as otherwise noted.      Objective           Vitals:  No vitals were obtained today due to virtual visit.    Physical Exam   General: Alert and no distress //Respiratory: No audible wheeze, cough, or shortness of breath // Psychiatric:  Appropriate affect, tone, and pace of words    Recent Results (from the past 720 hour(s))   CRP inflammation    Collection Time: 06/04/24  9:26 AM   Result Value Ref Range    CRP Inflammation 5.39 (H) <5.00 mg/L   Erythrocyte sedimentation rate auto    Collection Time: 06/04/24  9:26 AM   Result Value Ref Range    Erythrocyte Sedimentation Rate 19 0 - 20 mm/hr   Uric acid    Collection Time: 06/04/24  9:26 AM   Result Value Ref Range    Uric Acid 8.3 (H) 3.4 - 7.0 mg/dL   Cyclic Citrullinated Peptide Antibody IgG    Collection Time: 06/04/24  9:26 AM   Result Value Ref Range    Cyclic Citrullinated Peptide Antibody IgG 1.9 <7.0 U/mL   CBC with platelets and differential    Collection Time: 06/04/24  9:26 AM   Result Value Ref Range    WBC Count 6.8 4.0 - 11.0 10e3/uL    RBC Count 4.17 (L) 4.40 - 5.90 10e6/uL    Hemoglobin 12.4 (L) 13.3 - 17.7 g/dL    Hematocrit 36.9 (L) 40.0 - 53.0 %    MCV 89 78 - 100 fL    MCH 29.7 26.5 - 33.0 pg    MCHC 33.6 31.5 - 36.5 g/dL    RDW 13.3 10.0 - 15.0 %    Platelet Count 333 150 - 450 10e3/uL    % Neutrophils 77 %    % Lymphocytes 13 %    % Monocytes 7 %    % Eosinophils 3 %    % Basophils 1 %    % Immature Granulocytes 0 %    Absolute Neutrophils 5.2 1.6 - 8.3 10e3/uL    Absolute Lymphocytes 0.9 0.8 -  5.3 10e3/uL    Absolute Monocytes 0.5 0.0 - 1.3 10e3/uL    Absolute Eosinophils 0.2 0.0 - 0.7 10e3/uL    Absolute Basophils 0.0 0.0 - 0.2 10e3/uL    Absolute Immature Granulocytes 0.0 <=0.4 10e3/uL           Phone call duration: 18 minutes  Signed Electronically by: Cecilio Pro MD

## 2024-06-13 ENCOUNTER — OFFICE VISIT (OUTPATIENT)
Dept: OPTOMETRY | Facility: CLINIC | Age: 69
End: 2024-06-13
Payer: COMMERCIAL

## 2024-06-13 DIAGNOSIS — H52.223 REGULAR ASTIGMATISM OF BOTH EYES: ICD-10-CM

## 2024-06-13 DIAGNOSIS — H25.13 NUCLEAR AGE-RELATED CATARACT, BOTH EYES: ICD-10-CM

## 2024-06-13 DIAGNOSIS — Z01.01 ENCOUNTER FOR EXAMINATION OF EYES AND VISION WITH ABNORMAL FINDINGS: Primary | ICD-10-CM

## 2024-06-13 DIAGNOSIS — H52.13 MYOPIA OF BOTH EYES: ICD-10-CM

## 2024-06-13 DIAGNOSIS — H52.4 PRESBYOPIA: ICD-10-CM

## 2024-06-13 DIAGNOSIS — E11.9 TYPE 2 DIABETES MELLITUS WITHOUT RETINOPATHY (H): ICD-10-CM

## 2024-06-13 PROCEDURE — 92015 DETERMINE REFRACTIVE STATE: CPT | Performed by: OPTOMETRIST

## 2024-06-13 PROCEDURE — 92014 COMPRE OPH EXAM EST PT 1/>: CPT | Performed by: OPTOMETRIST

## 2024-06-13 ASSESSMENT — EXTERNAL EXAM - LEFT EYE: OS_EXAM: NORMAL

## 2024-06-13 ASSESSMENT — SLIT LAMP EXAM - LIDS
COMMENTS: 1+ DERMATOCHALASIS
COMMENTS: 1+ DERMATOCHALASIS

## 2024-06-13 ASSESSMENT — TONOMETRY
IOP_METHOD: APPLANATION
OS_IOP_MMHG: 15
OD_IOP_MMHG: 15

## 2024-06-13 ASSESSMENT — VISUAL ACUITY
OS_CC: 20/20
METHOD: SNELLEN - LINEAR
OS_SC: 20/300
OS_CC+: -1
OS_SC: 20/30+1
OD_CC: 20/20
OD_SC: 20/300
CORRECTION_TYPE: GLASSES
OS_CC: 20/20
OD_CC: 20/20
OD_SC: 20/20-2

## 2024-06-13 ASSESSMENT — REFRACTION_CURRENTRX
OD_SPHERE: -4.00
OD_AXIS: 160
OS_SPHERE: -4.00
OD_CYLINDER: -0.75
OD_BRAND: COOPER BIOFINITY TORIC BC 8.7, D 14.5
OS_AXIS: 030
OS_BRAND: COOPER BIOFINITY TORIC BC 8.7, D 14.5
OS_CYLINDER: -1.25

## 2024-06-13 ASSESSMENT — REFRACTION_WEARINGRX
OS_AXIS: 131
OD_ADD: +2.75
OD_SPHERE: -4.75
OS_CYLINDER: +0.75
OS_ADD: +2.75
OS_SPHERE: -4.75
SPECS_TYPE: PAL
OD_AXIS: 064
OD_CYLINDER: +1.00

## 2024-06-13 ASSESSMENT — REFRACTION_MANIFEST
OS_SPHERE: -4.50
OS_CYLINDER: +0.75
OS_AXIS: 122
OD_SPHERE: -5.00
OD_ADD: +2.75
OD_AXIS: 085
OD_CYLINDER: +0.75
OS_ADD: +2.75

## 2024-06-13 ASSESSMENT — CONF VISUAL FIELD
OS_SUPERIOR_TEMPORAL_RESTRICTION: 0
OD_INFERIOR_NASAL_RESTRICTION: 0
METHOD: COUNTING FINGERS
OS_INFERIOR_TEMPORAL_RESTRICTION: 0
OD_SUPERIOR_NASAL_RESTRICTION: 0
OS_SUPERIOR_NASAL_RESTRICTION: 0
OS_NORMAL: 1
OD_NORMAL: 1
OD_SUPERIOR_TEMPORAL_RESTRICTION: 0
OD_INFERIOR_TEMPORAL_RESTRICTION: 0
OS_INFERIOR_NASAL_RESTRICTION: 0

## 2024-06-13 ASSESSMENT — CUP TO DISC RATIO
OS_RATIO: 0.3
OD_RATIO: 0.45

## 2024-06-13 ASSESSMENT — EXTERNAL EXAM - RIGHT EYE: OD_EXAM: NORMAL

## 2024-06-13 NOTE — PATIENT INSTRUCTIONS
Updated glasses prescription provided today.   Continue with current contact lens prescription.     You have the start of mild cataracts.  You may notice some blurred vision or glare with night driving.  It is important that you wear good sunglasses to protect your eyes from the ultraviolet light from the sun.     Patient educated on importance of good blood sugar control.  Letter sent to primary care provider with diabetic eye exam report.     Return in 1 year for a comprehensive eye exam, or sooner if needed.      The effects of the dilating drops last for 4- 6 hours.  You will be more sensitive to light and vision will be blurry up close.  Mydriatic sunglasses were given if needed.     Garry Nelson, OD  [facility] [place]  [address] [place] [address]. MIRIAM Baugh  [id number]    [phone]

## 2024-06-13 NOTE — LETTER
6/13/2024      Florian Lawrence  [address] [facility] [address]      Dear Colleague,    Thank you for referring your patient, Florian Lawrence, to the [facility]. Please see a copy of my visit note below.    Chief Complaint   Patient presents with     Diabetic Eye Exam        Chief Complaint(s) and History of Present Illness(es)       Diabetic Eye Exam              Diabetes Type: Type 2, on insulin and taking oral medications    Duration: years    Blood Sugars: is controlled (Checks 2x/day)                   Lab Results   Component Value Date    A1C 6.5 01/15/2024    A1C 6.4 01/15/2024    A1C 6.1 08/07/2023    A1C 8.6 05/08/2023    A1C 6.0 09/22/2022    A1C 7.2 12/18/2020    A1C 6.7 06/30/2020    A1C 8.1 03/25/2020    A1C 6.8 10/14/2019    A1C 7.1 06/20/2019     -Floater right eye - ongoing for ~1 yr (-)flashes/curtain effect    Previous contact lens wearer? Yes - Biofinity Toric each eye   Comfort of contact lenses : Good  Satisfied with current lenses: Yes - does not want to update Rx unless needed - current CL Rx valid until 3/6/2025     Last Eye Exam: 3/6/2023  Dilated Previously: Yes, side effects of dilation explained today    What are you currently using to see?  Glasses (PAL's), +1.50 OTC Readers and contacts - wears CL's most of the time     Distance Vision Acuity: Noticed gradual change in both eyes    Near Vision Acuity: Not satisfied - harder time reading with glasses - does okay with CL's/readers     Eye Comfort: watery and itchy with allergies   Do you use eye drops? : No  Occupation or Hobbies: Retired     Windy Rajan  Optometry Assistant     Medical, surgical and family histories reviewed and updated 6/13/2024.       OBJECTIVE: See Ophthalmology exam    ASSESSMENT:    ICD-10-CM    1. Encounter for examination of eyes and vision with abnormal findings  Z01.01       2. Type 2 diabetes mellitus without retinopathy (H)  E11.9       3. Nuclear age-related cataract,  both eyes  H25.13       4. Myopia of both eyes  H52.13       5. Regular astigmatism of both eyes  H52.223       6. Presbyopia  H52.4           PLAN:    Florian Lawrence aware  eye exam results will be sent to Cecilio Pro.  Patient Instructions   Updated glasses prescription provided today.   Continue with current contact lens prescription.     You have the start of mild cataracts.  You may notice some blurred vision or glare with night driving.  It is important that you wear good sunglasses to protect your eyes from the ultraviolet light from the sun.     Patient educated on importance of good blood sugar control.  Letter sent to primary care provider with diabetic eye exam report.     Return in 1 year for a comprehensive eye exam, or sooner if needed.      The effects of the dilating drops last for 4- 6 hours.  You will be more sensitive to light and vision will be blurry up close.  Mydriatic sunglasses were given if needed.     Garry Nelson, HEVER  [address]  [place]  [address]    [phone]             Again, thank you for allowing me to participate in the care of your patient.        Sincerely,        Garry Nelson, OD

## 2024-06-16 ENCOUNTER — HEALTH MAINTENANCE LETTER (OUTPATIENT)
Age: 69
End: 2024-06-16

## 2024-06-20 DIAGNOSIS — E78.5 HYPERLIPIDEMIA LDL GOAL <100: ICD-10-CM

## 2024-06-20 RX ORDER — SIMVASTATIN 20 MG
20 TABLET ORAL DAILY
Qty: 90 TABLET | Refills: 3 | Status: SHIPPED | OUTPATIENT
Start: 2024-06-20

## 2024-07-15 DIAGNOSIS — E11.42 CONTROLLED TYPE 2 DIABETES MELLITUS WITH DIABETIC POLYNEUROPATHY, WITHOUT LONG-TERM CURRENT USE OF INSULIN (H): ICD-10-CM

## 2024-07-15 RX ORDER — PEN NEEDLE, DIABETIC 32GX 5/32"
NEEDLE, DISPOSABLE MISCELLANEOUS
Qty: 100 EACH | Refills: 10 | Status: SHIPPED | OUTPATIENT
Start: 2024-07-15

## 2024-07-29 ENCOUNTER — OFFICE VISIT (OUTPATIENT)
Dept: INTERNAL MEDICINE | Facility: CLINIC | Age: 69
End: 2024-07-29
Payer: COMMERCIAL

## 2024-07-29 VITALS
RESPIRATION RATE: 16 BRPM | WEIGHT: 248.4 LBS | OXYGEN SATURATION: 96 % | TEMPERATURE: 97.9 F | DIASTOLIC BLOOD PRESSURE: 81 MMHG | SYSTOLIC BLOOD PRESSURE: 129 MMHG | BODY MASS INDEX: 33.64 KG/M2 | HEIGHT: 72 IN | HEART RATE: 74 BPM

## 2024-07-29 DIAGNOSIS — Z23 NEED FOR SHINGLES VACCINE: ICD-10-CM

## 2024-07-29 DIAGNOSIS — Z29.11 NEED FOR VACCINATION AGAINST RESPIRATORY SYNCYTIAL VIRUS: ICD-10-CM

## 2024-07-29 DIAGNOSIS — E78.5 HYPERLIPIDEMIA LDL GOAL <100: ICD-10-CM

## 2024-07-29 DIAGNOSIS — E11.42 CONTROLLED TYPE 2 DIABETES MELLITUS WITH DIABETIC POLYNEUROPATHY, WITHOUT LONG-TERM CURRENT USE OF INSULIN (H): Primary | ICD-10-CM

## 2024-07-29 DIAGNOSIS — E55.9 HYPOVITAMINOSIS D: ICD-10-CM

## 2024-07-29 DIAGNOSIS — I10 HYPERTENSION GOAL BP (BLOOD PRESSURE) < 140/90: ICD-10-CM

## 2024-07-29 LAB
ANION GAP SERPL CALCULATED.3IONS-SCNC: 10 MMOL/L (ref 7–15)
BUN SERPL-MCNC: 25.2 MG/DL (ref 8–23)
CALCIUM SERPL-MCNC: 9.2 MG/DL (ref 8.8–10.4)
CHLORIDE SERPL-SCNC: 107 MMOL/L (ref 98–107)
CHOLEST SERPL-MCNC: 125 MG/DL
CREAT SERPL-MCNC: 1.25 MG/DL (ref 0.67–1.17)
EGFRCR SERPLBLD CKD-EPI 2021: 62 ML/MIN/1.73M2
FASTING STATUS PATIENT QL REPORTED: NO
FASTING STATUS PATIENT QL REPORTED: NO
GLUCOSE SERPL-MCNC: 97 MG/DL (ref 70–99)
HBA1C MFR BLD: 6.3 % (ref 0–5.6)
HCO3 SERPL-SCNC: 19 MMOL/L (ref 22–29)
HDLC SERPL-MCNC: 27 MG/DL
LDLC SERPL CALC-MCNC: ABNORMAL MG/DL
LDLC SERPL DIRECT ASSAY-MCNC: 43 MG/DL
NONHDLC SERPL-MCNC: 98 MG/DL
POTASSIUM SERPL-SCNC: 4.6 MMOL/L (ref 3.4–5.3)
SODIUM SERPL-SCNC: 136 MMOL/L (ref 135–145)
TRIGL SERPL-MCNC: 409 MG/DL
VIT D+METAB SERPL-MCNC: 56 NG/ML (ref 20–50)

## 2024-07-29 PROCEDURE — 99214 OFFICE O/P EST MOD 30 MIN: CPT | Performed by: INTERNAL MEDICINE

## 2024-07-29 PROCEDURE — 36415 COLL VENOUS BLD VENIPUNCTURE: CPT | Performed by: INTERNAL MEDICINE

## 2024-07-29 PROCEDURE — 83036 HEMOGLOBIN GLYCOSYLATED A1C: CPT | Performed by: INTERNAL MEDICINE

## 2024-07-29 PROCEDURE — 82306 VITAMIN D 25 HYDROXY: CPT | Performed by: INTERNAL MEDICINE

## 2024-07-29 PROCEDURE — 80061 LIPID PANEL: CPT | Performed by: INTERNAL MEDICINE

## 2024-07-29 PROCEDURE — 83721 ASSAY OF BLOOD LIPOPROTEIN: CPT | Mod: 59 | Performed by: INTERNAL MEDICINE

## 2024-07-29 PROCEDURE — 80048 BASIC METABOLIC PNL TOTAL CA: CPT | Performed by: INTERNAL MEDICINE

## 2024-07-29 PROCEDURE — G2211 COMPLEX E/M VISIT ADD ON: HCPCS | Performed by: INTERNAL MEDICINE

## 2024-07-29 RX ORDER — LOSARTAN POTASSIUM 25 MG/1
25 TABLET ORAL DAILY
Qty: 180 TABLET | Refills: 1 | Status: SHIPPED | OUTPATIENT
Start: 2024-07-29

## 2024-07-29 RX ORDER — INSULIN GLARGINE 100 [IU]/ML
60 INJECTION, SOLUTION SUBCUTANEOUS AT BEDTIME
Qty: 60 ML | Refills: 5 | Status: SHIPPED | OUTPATIENT
Start: 2024-07-29

## 2024-07-29 RX ORDER — GLIMEPIRIDE 4 MG/1
4 TABLET ORAL
Qty: 90 TABLET | Refills: 1 | Status: SHIPPED | OUTPATIENT
Start: 2024-07-29

## 2024-07-29 NOTE — LETTER
July 30, 2024      Jos Lawrence  [facility] [id number]        Dear Jos:    We are writing to inform you of your test results.    All of these tests are within acceptable limits.  Things look good ! Please let me know if you have any questions for me about all of these lab tests.    Resulted Orders   HEMOGLOBIN A1C   Result Value Ref Range    Hemoglobin A1C 6.3 (H) 0.0 - 5.6 %      Comment:      Normal <5.7%   Prediabetes 5.7-6.4%    Diabetes 6.5% or higher     Note: Adopted from ADA consensus guidelines.   Basic metabolic panel  (Ca, Cl, CO2, Creat, Gluc, K, Na, BUN)   Result Value Ref Range    Sodium 136 135 - 145 mmol/L    Potassium 4.6 3.4 - 5.3 mmol/L    Chloride 107 98 - 107 mmol/L    Carbon Dioxide (CO2) 19 (L) 22 - 29 mmol/L    Anion Gap 10 7 - 15 mmol/L    Urea Nitrogen 25.2 (H) 8.0 - 23.0 mg/dL    Creatinine 1.25 (H) 0.67 - 1.17 mg/dL    GFR Estimate 62 >60 mL/min/1.73m2      Comment:      eGFR calculated using 2021 CKD-EPI equation.    Calcium 9.2 8.8 - 10.4 mg/dL      Comment:      Reference intervals for this test were updated on 7/16/2024 to reflect our healthy population more accurately. There may be differences in the flagging of prior results with similar values performed with this method. Those prior results can be interpreted in the context of the updated reference intervals.    Glucose 97 70 - 99 mg/dL    Patient Fasting > 8hrs? No    Lipid panel reflex to direct LDL Fasting   Result Value Ref Range    Cholesterol 125 <200 mg/dL    Triglycerides 409 (H) <150 mg/dL    Direct Measure HDL 27 (L) >=40 mg/dL    LDL Cholesterol Calculated        Comment:      Cannot estimate LDL when triglyceride exceeds 400 mg/dL    Non HDL Cholesterol 98 <130 mg/dL    Patient Fasting > 8hrs? No     Narrative    Cholesterol  Desirable:  <200 mg/dL    Triglycerides  Normal:  Less than 150 mg/dL  Borderline High:  150-199 mg/dL  High:  200-499 mg/dL  Very High:  Greater than or equal to 500  mg/dL    Direct Measure HDL  Female:  Greater than or equal to 50 mg/dL   Male:  Greater than or equal to 40 mg/dL    LDL Cholesterol  Desirable:  <100mg/dL  Above Desirable:  100-129 mg/dL   Borderline High:  130-159 mg/dL   High:  160-189 mg/dL   Very High:  >= 190 mg/dL    Non HDL Cholesterol  Desirable:  130 mg/dL  Above Desirable:  130-159 mg/dL  Borderline High:  160-189 mg/dL  High:  190-219 mg/dL  Very High:  Greater than or equal to 220 mg/dL   Vitamin D Deficiency   Result Value Ref Range    Vitamin D, Total (25-Hydroxy) 56 (H) 20 - 50 ng/mL      Comment:      indicates supplementation, with increased risk of hypercalciuria    Narrative    Season, race, dietary intake, and treatment affect the concentration of 25-hydroxy-Vitamin D. Values may decrease during winter months and increase during summer months.    Vitamin D determination is routinely performed by an immunoassay specific for 25 hydroxyvitamin D3.  If an individual is on vitamin D2(ergocalciferol) supplementation, please specify 25 OH vitamin D2 and D3 level determination by LCMSMS test VITD23.     LDL cholesterol direct   Result Value Ref Range    LDL Cholesterol Direct 43 <100 mg/dL      Comment:      Age 2-19 years:  Desirable: 0-110 mg/dL   Borderline high: 110-129 mg/dL   High: >= 130 mg/dL    Age 20 years and older:  Desirable: <100mg/dL  Above desirable: 100-129 mg/dL   Borderline high: 130-159 mg/dL   High: 160-189 mg/dL   Very high: >= 190 mg/dL       Sincerely,      Cecilio Pro MD/rashad

## 2024-07-29 NOTE — PROGRESS NOTES
"  Assessment & Plan     Controlled type 2 diabetes mellitus with diabetic polyneuropathy, without long-term current use of insulin (H)  A 6 month check-in regarding primarily diabetes mellitus type 2 . Have known this patient for quite a long time and it is with wonderful reassurance we can see his hemoglobin a1c  [ diabetes test ] is perfect, he's current with diabetes mellitus benchmarks   - HEMOGLOBIN A1C; Future  - Basic metabolic panel  (Ca, Cl, CO2, Creat, Gluc, K, Na, BUN); Future  - HEMOGLOBIN A1C  - Basic metabolic panel  (Ca, Cl, CO2, Creat, Gluc, K, Na, BUN)  - FOOT EXAM  - glimepiride (AMARYL) 4 MG tablet; Take 1 tablet (4 mg) by mouth every morning (before breakfast)  - insulin glargine (LANTUS SOLOSTAR) 100 UNIT/ML pen; Inject 60 Units subcutaneously at bedtime  - metFORMIN (GLUCOPHAGE) 1000 MG tablet; Take 1 tablet (1,000 mg) by mouth 2 times daily (with meals)    Hyperlipidemia LDL goal <100  Problem is stable and ongoing monitoring    - Lipid panel reflex to direct LDL Fasting; Future  - Lipid panel reflex to direct LDL Fasting    Need for shingles vaccine  Discussed, declines     Need for vaccination against respiratory syncytial virus  Discussed, declines although he says he will consider for the fall with the new Coronavirus (COVID-19) immunization and prophylactic vaccination and inoculation against influenza     Hypovitaminosis D  Due for recheck   - Vitamin D Deficiency; Future  - Vitamin D Deficiency    Hypertension goal BP (blood pressure) < 140/90  Excellent blood pressure and continue current plan of care    - losartan (COZAAR) 25 MG tablet; Take 1 tablet (25 mg) by mouth daily          BMI  Estimated body mass index is 33.92 kg/m  as calculated from the following:    Height as of this encounter: 1.822 m (5' 11.75\").    Weight as of this encounter: 112.7 kg (248 lb 6.4 oz).   Weight management plan: Discussed healthy diet and exercise guidelines  Blood sugar testing frequency " justification:  Risk of hypoglycemia with medication(s)    Subjective   Jos is a 69 year old, presenting for the following health issues:  Diabetes and Mouth/Lip Problem (Scab, lip was numb for one day and now just has a scar now)      7/29/2024    11:52 AM   Additional Questions   Roomed by alanis     Mouth/Lip Problem    History of Present Illness       Diabetes:   He presents for follow up of diabetes.  He is checking home blood glucose two times daily.   He checks blood glucose before meals.  Blood glucose is never over 200 and sometimes under 70. He is aware of hypoglycemia symptoms including shakiness.    He has no concerns regarding his diabetes at this time.  He is having numbness in feet.            He eats 4 or more servings of fruits and vegetables daily.He consumes 0 sweetened beverage(s) daily.He exercises with enough effort to increase his heart rate 60 or more minutes per day.  He exercises with enough effort to increase his heart rate 4 days per week.   He is taking medications regularly.     Diabetes mellitus has been good, rare blood glucose spikes or valleys.  Lab Results   Component Value Date    A1C 6.3 07/29/2024    A1C 6.5 01/15/2024    A1C 6.4 01/15/2024    A1C 6.1 08/07/2023    A1C 8.6 05/08/2023    A1C 7.2 12/18/2020    A1C 6.7 06/30/2020    A1C 8.1 03/25/2020    A1C 6.8 10/14/2019    A1C 7.1 06/20/2019     Diabetic eye exam , patient is current with this one  Diabetes neuropathy - problem is stable and ongoing monitoring almost 20 years or since about , performs regular Self foot examinations are emphasized     Blood pressure is awesome    Wt Readings from Last 5 Encounters:   07/29/24 112.7 kg (248 lb 6.4 oz)   06/04/24 113.4 kg (250 lb)   01/15/24 113.4 kg (250 lb)   12/22/23 112.6 kg (248 lb 4 oz)   05/26/23 109.5 kg (241 lb 6.4 oz)     Body mass index is 33.92 kg/m .    Plays golf 3 days per week, using a golf cart    2 weeks ago while on the golf course he had a numb lower lip and he  "did develop a lip sore. Base don history alone this sounds like probably an aphthous ulcer    Health Maintenance Due   Topic Date Due    ZOSTER IMMUNIZATION (1 of 2) Never done    RSV VACCINE (Pregnancy & 60+) (1 - 1-dose 60+ series) Never done    COVID-19 Vaccine (4 - 2023-24 season) 09/01/2023    DIABETIC FOOT EXAM  05/08/2024            Review of Systems  Constitutional, HEENT, cardiovascular, pulmonary, gi and gu systems are negative, except as otherwise noted.      Objective    /81   Pulse 74   Temp 97.9  F (36.6  C) (Temporal)   Resp 16   Ht 1.822 m (5' 11.75\")   Wt 112.7 kg (248 lb 6.4 oz)   SpO2 96%   BMI 33.92 kg/m    Body mass index is 33.92 kg/m .  Physical Exam   GENERAL: alert and no distress  EYES: Eyes grossly normal to inspection, PERRL and conjunctivae and sclerae normal  RESP: lungs clear to auscultation - no rales, rhonchi or wheezes  CV: regular rate and rhythm, normal S1 S2, no S3 or S4, no murmur, click or rub, no peripheral edema  ABDOMEN: soft, nontender, no hepatosplenomegaly, no masses and bowel sounds normal  MS: no gross musculoskeletal defects noted, no edema  Diabetic foot exam: normal DP and PT pulses, no trophic changes or ulcerative lesions, and evidence of mild bilateral diabetes neuropathy with no anatomic abnormality otherwise , patient says stable for past 15 years now    Orders Placed This Encounter   Procedures    FOOT EXAM    HEMOGLOBIN A1C    Basic metabolic panel  (Ca, Cl, CO2, Creat, Gluc, K, Na, BUN)    Lipid panel reflex to direct LDL Fasting    Vitamin D Deficiency             Signed Electronically by: Cecilio Pro MD    "

## 2024-10-23 DIAGNOSIS — E11.42 CONTROLLED TYPE 2 DIABETES MELLITUS WITH DIABETIC POLYNEUROPATHY, WITHOUT LONG-TERM CURRENT USE OF INSULIN (H): ICD-10-CM

## 2024-11-03 ENCOUNTER — HEALTH MAINTENANCE LETTER (OUTPATIENT)
Age: 69
End: 2024-11-03

## 2024-11-14 DIAGNOSIS — E11.42 CONTROLLED TYPE 2 DIABETES MELLITUS WITH DIABETIC POLYNEUROPATHY, WITHOUT LONG-TERM CURRENT USE OF INSULIN (H): ICD-10-CM

## 2024-11-14 RX ORDER — GLIMEPIRIDE 4 MG/1
TABLET ORAL
Qty: 90 TABLET | Refills: 1 | Status: SHIPPED | OUTPATIENT
Start: 2024-11-14

## 2025-01-16 ENCOUNTER — OFFICE VISIT (OUTPATIENT)
Dept: INTERNAL MEDICINE | Facility: CLINIC | Age: 70
End: 2025-01-16
Payer: COMMERCIAL

## 2025-01-16 VITALS
WEIGHT: 252 LBS | HEART RATE: 73 BPM | SYSTOLIC BLOOD PRESSURE: 132 MMHG | HEIGHT: 72 IN | TEMPERATURE: 97.6 F | DIASTOLIC BLOOD PRESSURE: 78 MMHG | BODY MASS INDEX: 34.13 KG/M2 | OXYGEN SATURATION: 98 % | RESPIRATION RATE: 16 BRPM

## 2025-01-16 DIAGNOSIS — N18.1 CKD (CHRONIC KIDNEY DISEASE) STAGE 1, GFR 90 ML/MIN OR GREATER: ICD-10-CM

## 2025-01-16 DIAGNOSIS — E11.42 CONTROLLED TYPE 2 DIABETES MELLITUS WITH DIABETIC POLYNEUROPATHY, WITHOUT LONG-TERM CURRENT USE OF INSULIN (H): ICD-10-CM

## 2025-01-16 DIAGNOSIS — Z23 NEEDS FLU SHOT: ICD-10-CM

## 2025-01-16 DIAGNOSIS — Z23 NEED FOR TDAP VACCINATION: ICD-10-CM

## 2025-01-16 DIAGNOSIS — Z00.00 ENCOUNTER FOR MEDICARE ANNUAL WELLNESS EXAM: Primary | ICD-10-CM

## 2025-01-16 DIAGNOSIS — Z23 NEED FOR SHINGLES VACCINE: ICD-10-CM

## 2025-01-16 DIAGNOSIS — L30.9 ECZEMA, UNSPECIFIED TYPE: ICD-10-CM

## 2025-01-16 DIAGNOSIS — Z29.11 NEED FOR VACCINATION AGAINST RESPIRATORY SYNCYTIAL VIRUS: ICD-10-CM

## 2025-01-16 DIAGNOSIS — Z12.5 SCREENING PSA (PROSTATE SPECIFIC ANTIGEN): ICD-10-CM

## 2025-01-16 LAB
EST. AVERAGE GLUCOSE BLD GHB EST-MCNC: 151 MG/DL
HBA1C MFR BLD: 6.9 % (ref 0–5.6)

## 2025-01-16 RX ORDER — PEN NEEDLE, DIABETIC 32GX 5/32"
NEEDLE, DISPOSABLE MISCELLANEOUS
Qty: 100 EACH | Refills: 10 | Status: SHIPPED | OUTPATIENT
Start: 2025-01-16

## 2025-01-16 RX ORDER — INSULIN GLARGINE 100 [IU]/ML
60 INJECTION, SOLUTION SUBCUTANEOUS AT BEDTIME
Qty: 60 ML | Refills: 5 | Status: SHIPPED | OUTPATIENT
Start: 2025-01-16

## 2025-01-16 SDOH — HEALTH STABILITY: PHYSICAL HEALTH: ON AVERAGE, HOW MANY DAYS PER WEEK DO YOU ENGAGE IN MODERATE TO STRENUOUS EXERCISE (LIKE A BRISK WALK)?: 2 DAYS

## 2025-01-16 SDOH — HEALTH STABILITY: PHYSICAL HEALTH: ON AVERAGE, HOW MANY MINUTES DO YOU ENGAGE IN EXERCISE AT THIS LEVEL?: 150+ MIN

## 2025-01-16 ASSESSMENT — SOCIAL DETERMINANTS OF HEALTH (SDOH): HOW OFTEN DO YOU GET TOGETHER WITH FRIENDS OR RELATIVES?: TWICE A WEEK

## 2025-01-16 NOTE — PATIENT INSTRUCTIONS
Hi, nice to see you today     We reviewed the preventative healthcare maintenance goals and see you are advised to consider immunizations for    TDAP vaccination   Vaccine for Coronavirus (COVID-19)   Prophylactic vaccination and inoculation against influenza   ShingRx vaccination against herpes zoster   RSV vaccine   Patient Education   Preventive Care Advice   This is general advice given by our system to help you stay healthy. However, your care team may have specific advice just for you. Please talk to your care team about your preventive care needs.  Nutrition  Eat 5 or more servings of fruits and vegetables each day.  Try wheat bread, brown rice and whole grain pasta (instead of white bread, rice, and pasta).  Get enough calcium and vitamin D. Check the label on foods and aim for 100% of the RDA (recommended daily allowance).  Lifestyle  Exercise at least 150 minutes each week  (30 minutes a day, 5 days a week).  Do muscle strengthening activities 2 days a week. These help control your weight and prevent disease.  No smoking.  Wear sunscreen to prevent skin cancer.  Have a dental exam and cleaning every 6 months.  Yearly exams  See your health care team every year to talk about:  Any changes in your health.  Any medicines your care team has prescribed.  Preventive care, family planning, and ways to prevent chronic diseases.  Shots (vaccines)   HPV shots (up to age 26), if you've never had them before.  Hepatitis B shots (up to age 59), if you've never had them before.  COVID-19 shot: Get this shot when it's due.  Flu shot: Get a flu shot every year.  Tetanus shot: Get a tetanus shot every 10 years.  Pneumococcal, hepatitis A, and RSV shots: Ask your care team if you need these based on your risk.  Shingles shot (for age 50 and up)  General health tests  Diabetes screening:  Starting at age 35, Get screened for diabetes at least every 3 years.  If you are younger than age 35, ask your care team if you should be  screened for diabetes.  Cholesterol test: At age 39, start having a cholesterol test every 5 years, or more often if advised.  Bone density scan (DEXA): At age 50, ask your care team if you should have this scan for osteoporosis (brittle bones).  Hepatitis C: Get tested at least once in your life.  STIs (sexually transmitted infections)  Before age 24: Ask your care team if you should be screened for STIs.  After age 24: Get screened for STIs if you're at risk. You are at risk for STIs (including HIV) if:  You are sexually active with more than one person.  You don't use condoms every time.  You or a partner was diagnosed with a sexually transmitted infection.  If you are at risk for HIV, ask about PrEP medicine to prevent HIV.  Get tested for HIV at least once in your life, whether you are at risk for HIV or not.  Cancer screening tests  Cervical cancer screening: If you have a cervix, begin getting regular cervical cancer screening tests starting at age 21.  Breast cancer scan (mammogram): If you've ever had breasts, begin having regular mammograms starting at age 40. This is a scan to check for breast cancer.  Colon cancer screening: It is important to start screening for colon cancer at age 45.  Have a colonoscopy test every 10 years (or more often if you're at risk) Or, ask your provider about stool tests like a FIT test every year or Cologuard test every 3 years.  To learn more about your testing options, visit:   .  For help making a decision, visit:   https://bit.ly/yp03389.  Prostate cancer screening test: If you have a prostate, ask your care team if a prostate cancer screening test (PSA) at age 55 is right for you.  Lung cancer screening: If you are a current or former smoker ages 50 to 80, ask your care team if ongoing lung cancer screenings are right for you.  For informational purposes only. Not to replace the advice of your health care provider. Copyright   2023 [employer]. All rights  reserved. Clinically reviewed by the [facility]. [place] [id number] - REV 01/24.
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
## 2025-03-05 DIAGNOSIS — E79.0 HYPERURICEMIA: ICD-10-CM

## 2025-03-05 RX ORDER — ALLOPURINOL 300 MG/1
300 TABLET ORAL DAILY
Qty: 90 TABLET | Refills: 3 | OUTPATIENT
Start: 2025-03-05

## 2025-04-19 ENCOUNTER — HEALTH MAINTENANCE LETTER (OUTPATIENT)
Age: 70
End: 2025-04-19

## 2025-05-10 DIAGNOSIS — E11.42 CONTROLLED TYPE 2 DIABETES MELLITUS WITH DIABETIC POLYNEUROPATHY, WITHOUT LONG-TERM CURRENT USE OF INSULIN (H): ICD-10-CM

## 2025-05-12 RX ORDER — GLIMEPIRIDE 4 MG/1
TABLET ORAL
Qty: 90 TABLET | Refills: 0 | Status: SHIPPED | OUTPATIENT
Start: 2025-05-12

## 2025-06-03 DIAGNOSIS — E79.0 HYPERURICEMIA: ICD-10-CM

## 2025-06-03 RX ORDER — ALLOPURINOL 300 MG/1
1 TABLET ORAL DAILY
Qty: 90 TABLET | Refills: 1 | Status: SHIPPED | OUTPATIENT
Start: 2025-06-03

## 2025-06-07 PROBLEM — R07.9 CHEST PAIN, UNSPECIFIED TYPE: Status: RESOLVED | Noted: 2022-12-13 | Resolved: 2025-06-07

## 2025-06-16 ENCOUNTER — OFFICE VISIT (OUTPATIENT)
Dept: OPTOMETRY | Facility: CLINIC | Age: 70
End: 2025-06-16
Payer: COMMERCIAL

## 2025-06-16 DIAGNOSIS — Z46.0 CONTACT LENS/GLASSES FITTING: ICD-10-CM

## 2025-06-16 DIAGNOSIS — H52.223 REGULAR ASTIGMATISM OF BOTH EYES: ICD-10-CM

## 2025-06-16 DIAGNOSIS — Z01.01 ENCOUNTER FOR EXAMINATION OF EYES AND VISION WITH ABNORMAL FINDINGS: Primary | ICD-10-CM

## 2025-06-16 DIAGNOSIS — H52.4 PRESBYOPIA: ICD-10-CM

## 2025-06-16 DIAGNOSIS — H25.13 NUCLEAR AGE-RELATED CATARACT, BOTH EYES: ICD-10-CM

## 2025-06-16 DIAGNOSIS — E11.9 TYPE 2 DIABETES MELLITUS WITHOUT RETINOPATHY (H): ICD-10-CM

## 2025-06-16 DIAGNOSIS — H52.13 MYOPIA OF BOTH EYES: ICD-10-CM

## 2025-06-16 PROCEDURE — 2023F DILAT RTA XM W/O RTNOPTHY: CPT | Performed by: OPTOMETRIST

## 2025-06-16 PROCEDURE — 92015 DETERMINE REFRACTIVE STATE: CPT | Performed by: OPTOMETRIST

## 2025-06-16 PROCEDURE — 92310 CONTACT LENS FITTING OU: CPT | Mod: GA | Performed by: OPTOMETRIST

## 2025-06-16 PROCEDURE — 92014 COMPRE OPH EXAM EST PT 1/>: CPT | Performed by: OPTOMETRIST

## 2025-06-16 ASSESSMENT — CONF VISUAL FIELD
OS_INFERIOR_NASAL_RESTRICTION: 0
OD_SUPERIOR_TEMPORAL_RESTRICTION: 0
OD_INFERIOR_NASAL_RESTRICTION: 0
OD_NORMAL: 1
OD_INFERIOR_TEMPORAL_RESTRICTION: 0
METHOD: COUNTING FINGERS
OS_SUPERIOR_TEMPORAL_RESTRICTION: 0
OS_NORMAL: 1
OD_SUPERIOR_NASAL_RESTRICTION: 0
OS_SUPERIOR_NASAL_RESTRICTION: 0
OS_INFERIOR_TEMPORAL_RESTRICTION: 0

## 2025-06-16 ASSESSMENT — REFRACTION_CURRENTRX
OS_SPHERE: -4.00
OD_CYLINDER: -0.75
OS_BRAND: COOPER BIOFINITY TORIC BC 8.7, D 14.5
OD_BRAND: COOPER BIOFINITY TORIC BC 8.7, D 14.5
OD_SPHERE: -4.00
OS_AXIS: 030
OS_CYLINDER: -1.25
OD_AXIS: 160

## 2025-06-16 ASSESSMENT — CUP TO DISC RATIO
OS_RATIO: 0.35
OD_RATIO: 0.45

## 2025-06-16 ASSESSMENT — VISUAL ACUITY
METHOD: SNELLEN - LINEAR
OS_CC+: +2
OD_CC: 20/20 -1
OS_CC: 20/30
OD_CC: 20/20
OS_CC: 20/40 -2
OD_CC+: -1

## 2025-06-16 ASSESSMENT — REFRACTION_WEARINGRX
SPECS_TYPE: PAL
OD_CYLINDER: +1.00
OS_SPHERE: -4.75
OS_CYLINDER: +0.75
OD_SPHERE: -4.75
OD_AXIS: 064
OD_ADD: +2.75
OS_AXIS: 131
OS_ADD: +2.75

## 2025-06-16 ASSESSMENT — REFRACTION_MANIFEST
OD_ADD: +2.75
OD_AXIS: 065
OS_ADD: +2.75
OS_CYLINDER: +0.75
OS_AXIS: 125
OS_SPHERE: -4.50
OD_SPHERE: -4.50
OD_CYLINDER: +0.50

## 2025-06-16 ASSESSMENT — SLIT LAMP EXAM - LIDS
COMMENTS: 1+ DERMATOCHALASIS
COMMENTS: 1+ DERMATOCHALASIS

## 2025-06-16 ASSESSMENT — TONOMETRY
OD_IOP_MMHG: 15
IOP_METHOD: APPLANATION
OS_IOP_MMHG: 14

## 2025-06-16 ASSESSMENT — EXTERNAL EXAM - LEFT EYE: OS_EXAM: NORMAL

## 2025-06-16 ASSESSMENT — EXTERNAL EXAM - RIGHT EYE: OD_EXAM: NORMAL

## 2025-06-16 NOTE — PROGRESS NOTES
Chief Complaint   Patient presents with    Diabetic Eye Exam        Chief Complaint(s) and History of Present Illness(es)       Diabetic Eye Exam              Diabetes Type: Type 2, on insulin and taking oral medications    Duration: years    Blood Sugars: is controlled                   Lab Results   Component Value Date    A1C 6.9 01/16/2025    A1C 6.3 07/29/2024    A1C 6.5 01/15/2024    A1C 6.4 01/15/2024    A1C 6.1 08/07/2023    A1C 7.2 12/18/2020    A1C 6.7 06/30/2020    A1C 8.1 03/25/2020    A1C 6.8 10/14/2019    A1C 7.1 06/20/2019       Last Eye Exam: 06/13/2024  Dilated Previously: Yes    What are you currently using to see?  Glasses and contacts    Distance Vision Acuity: Satisfied with vision    Near Vision Acuity: Satisfied with vision while reading and using computer with glasses and Contacts    Eye Comfort: itchy when has seasonal allergies  Do you use eye drops? : No  Occupation or Hobbies: Retired    Melinda Bailon  Optometric Assistant       Medical, surgical and family histories reviewed and updated 6/16/2025.       OBJECTIVE: See Ophthalmology exam    ASSESSMENT:    ICD-10-CM    1. Encounter for examination of eyes and vision with abnormal findings  Z01.01       2. Type 2 diabetes mellitus without retinopathy (H)  E11.9       3. Nuclear age-related cataract, both eyes  H25.13       4. Myopia of both eyes  H52.13       5. Regular astigmatism of both eyes  H52.223       6. Presbyopia  H52.4       7. Contact lens/glasses fitting  Z46.0           PLAN:    Florian Lawrence aware  eye exam results will be sent to Cecilio Pro.  Patient Instructions   Patient educated on importance of good blood sugar control.  Letter sent to primary care provider with diabetic eye exam report.     You have the start of mild cataracts.  You may notice some blurred vision or glare with night driving.  It is important that you wear good sunglasses to protect your eyes from the ultraviolet light from the sun.     Updated  glasses and contact lens prescriptions provided today.     Maximum wear-time of 12 hours/day of the contact lenses.   Clean the lenses nightly in contact lens solution.   No sleeping or swimming in the contact lenses.   Replace the lenses monthly.     Return in 1 year for a comprehensive eye exam, or sooner if needed.      The effects of the dilating drops last for 4- 6 hours.  You will be more sensitive to light and vision will be blurry up close.  Mydriatic sunglasses were given if needed.     Garry Nelson, HEVER  [address] [place]  [address]    [phone]

## 2025-06-16 NOTE — PATIENT INSTRUCTIONS
Patient educated on importance of good blood sugar control.  Letter sent to primary care provider with diabetic eye exam report.     You have the start of mild cataracts.  You may notice some blurred vision or glare with night driving.  It is important that you wear good sunglasses to protect your eyes from the ultraviolet light from the sun.     Updated glasses and contact lens prescriptions provided today.     Maximum wear-time of 12 hours/day of the contact lenses.   Clean the lenses nightly in contact lens solution.   No sleeping or swimming in the contact lenses.   Replace the lenses monthly.     Return in 1 year for a comprehensive eye exam, or sooner if needed.      The effects of the dilating drops last for 4- 6 hours.  You will be more sensitive to light and vision will be blurry up close.  Mydriatic sunglasses were given if needed.     Garry Nelson, OD  [address] [place]  [place]  [address]    [phone]

## 2025-06-16 NOTE — LETTER
6/16/2025      Florian Lawrence  [address] [facility] [address]      Dear Colleague,    Thank you for referring your patient, Florian Lawrence, to the [facility]. Please see a copy of my visit note below.    Chief Complaint   Patient presents with     Diabetic Eye Exam        Chief Complaint(s) and History of Present Illness(es)       Diabetic Eye Exam              Diabetes Type: Type 2, on insulin and taking oral medications    Duration: years    Blood Sugars: is controlled                   Lab Results   Component Value Date    A1C 6.9 01/16/2025    A1C 6.3 07/29/2024    A1C 6.5 01/15/2024    A1C 6.4 01/15/2024    A1C 6.1 08/07/2023    A1C 7.2 12/18/2020    A1C 6.7 06/30/2020    A1C 8.1 03/25/2020    A1C 6.8 10/14/2019    A1C 7.1 06/20/2019       Last Eye Exam: 06/13/2024  Dilated Previously: Yes    What are you currently using to see?  Glasses and contacts    Distance Vision Acuity: Satisfied with vision    Near Vision Acuity: Satisfied with vision while reading and using computer with glasses and Contacts    Eye Comfort: itchy when has seasonal allergies  Do you use eye drops? : No  Occupation or Hobbies: Retired    Melinda Bailon  Optometric Assistant       Medical, surgical and family histories reviewed and updated 6/16/2025.       OBJECTIVE: See Ophthalmology exam    ASSESSMENT:    ICD-10-CM    1. Encounter for examination of eyes and vision with abnormal findings  Z01.01       2. Type 2 diabetes mellitus without retinopathy (H)  E11.9       3. Nuclear age-related cataract, both eyes  H25.13       4. Myopia of both eyes  H52.13       5. Regular astigmatism of both eyes  H52.223       6. Presbyopia  H52.4       7. Contact lens/glasses fitting  Z46.0           PLAN:    Florian Lawrence aware  eye exam results will be sent to [facility].  Patient Instructions   Patient educated on importance of good blood sugar control.  Letter sent to primary care provider with diabetic eye  exam report.     You have the start of mild cataracts.  You may notice some blurred vision or glare with night driving.  It is important that you wear good sunglasses to protect your eyes from the ultraviolet light from the sun.     Updated glasses and contact lens prescriptions provided today.     Maximum wear-time of 12 hours/day of the contact lenses.   Clean the lenses nightly in contact lens solution.   No sleeping or swimming in the contact lenses.   Replace the lenses monthly.     Return in 1 year for a comprehensive eye exam, or sooner if needed.      The effects of the dilating drops last for 4- 6 hours.  You will be more sensitive to light and vision will be blurry up close.  Mydriatic sunglasses were given if needed.     Garry Nelson OD  [address] [place]  [id number] [phone]           Again, thank you for allowing me to participate in the care of your patient.        Sincerely,        Garry Nelson OD    Electronically signed

## 2025-06-18 DIAGNOSIS — E11.42 CONTROLLED TYPE 2 DIABETES MELLITUS WITH DIABETIC POLYNEUROPATHY, WITHOUT LONG-TERM CURRENT USE OF INSULIN (H): ICD-10-CM

## 2025-06-18 DIAGNOSIS — E78.5 HYPERLIPIDEMIA LDL GOAL <100: ICD-10-CM

## 2025-06-18 RX ORDER — SIMVASTATIN 20 MG
20 TABLET ORAL DAILY
Qty: 90 TABLET | Refills: 1 | Status: SHIPPED | OUTPATIENT
Start: 2025-06-18

## 2025-08-02 ENCOUNTER — HEALTH MAINTENANCE LETTER (OUTPATIENT)
Age: 70
End: 2025-08-02

## 2025-08-09 DIAGNOSIS — E11.42 CONTROLLED TYPE 2 DIABETES MELLITUS WITH DIABETIC POLYNEUROPATHY, WITHOUT LONG-TERM CURRENT USE OF INSULIN (H): ICD-10-CM

## 2025-08-09 DIAGNOSIS — N18.1 CKD (CHRONIC KIDNEY DISEASE) STAGE 1, GFR 90 ML/MIN OR GREATER: ICD-10-CM

## 2025-08-09 DIAGNOSIS — E11.29 MICROALBUMINURIA DUE TO TYPE 2 DIABETES MELLITUS (H): ICD-10-CM

## 2025-08-09 DIAGNOSIS — R80.9 MICROALBUMINURIA DUE TO TYPE 2 DIABETES MELLITUS (H): ICD-10-CM

## 2025-08-11 RX ORDER — LOSARTAN POTASSIUM 50 MG/1
50 TABLET ORAL DAILY
Qty: 90 TABLET | Refills: 0 | Status: SHIPPED | OUTPATIENT
Start: 2025-08-11

## 2025-08-18 DIAGNOSIS — E11.42 CONTROLLED TYPE 2 DIABETES MELLITUS WITH DIABETIC POLYNEUROPATHY, WITHOUT LONG-TERM CURRENT USE OF INSULIN (H): ICD-10-CM

## 2025-08-18 RX ORDER — GLIMEPIRIDE 4 MG/1
TABLET ORAL
Qty: 90 TABLET | Refills: 0 | Status: SHIPPED | OUTPATIENT
Start: 2025-08-18 | End: 2025-08-21

## 2025-08-19 DIAGNOSIS — E11.42 CONTROLLED TYPE 2 DIABETES MELLITUS WITH DIABETIC POLYNEUROPATHY, WITHOUT LONG-TERM CURRENT USE OF INSULIN (H): ICD-10-CM

## 2025-08-21 ENCOUNTER — OFFICE VISIT (OUTPATIENT)
Dept: INTERNAL MEDICINE | Facility: CLINIC | Age: 70
End: 2025-08-21
Payer: COMMERCIAL

## 2025-08-21 VITALS
TEMPERATURE: 98.9 F | WEIGHT: 247 LBS | HEART RATE: 76 BPM | BODY MASS INDEX: 33.46 KG/M2 | OXYGEN SATURATION: 96 % | RESPIRATION RATE: 16 BRPM | DIASTOLIC BLOOD PRESSURE: 70 MMHG | SYSTOLIC BLOOD PRESSURE: 125 MMHG | HEIGHT: 72 IN

## 2025-08-21 DIAGNOSIS — Z00.00 ENCOUNTER FOR MEDICARE ANNUAL WELLNESS EXAM: Primary | ICD-10-CM

## 2025-08-21 DIAGNOSIS — N18.1 CKD (CHRONIC KIDNEY DISEASE) STAGE 1, GFR 90 ML/MIN OR GREATER: ICD-10-CM

## 2025-08-21 DIAGNOSIS — E11.42 CONTROLLED TYPE 2 DIABETES MELLITUS WITH DIABETIC POLYNEUROPATHY, WITHOUT LONG-TERM CURRENT USE OF INSULIN (H): ICD-10-CM

## 2025-08-21 DIAGNOSIS — Z23 NEED FOR VACCINATION: ICD-10-CM

## 2025-08-21 DIAGNOSIS — R80.9 MICROALBUMINURIA DUE TO TYPE 2 DIABETES MELLITUS (H): ICD-10-CM

## 2025-08-21 DIAGNOSIS — Z13.6 SCREENING FOR CARDIOVASCULAR CONDITION: ICD-10-CM

## 2025-08-21 DIAGNOSIS — E11.29 MICROALBUMINURIA DUE TO TYPE 2 DIABETES MELLITUS (H): ICD-10-CM

## 2025-08-21 LAB
EST. AVERAGE GLUCOSE BLD GHB EST-MCNC: 134 MG/DL
HBA1C MFR BLD: 6.3 % (ref 0–5.6)

## 2025-08-21 RX ORDER — LOSARTAN POTASSIUM 50 MG/1
50 TABLET ORAL DAILY
Qty: 90 TABLET | Refills: 1 | Status: SHIPPED | OUTPATIENT
Start: 2025-08-21

## 2025-08-21 RX ORDER — GLIMEPIRIDE 4 MG/1
4 TABLET ORAL
Qty: 90 TABLET | Refills: 1 | Status: SHIPPED | OUTPATIENT
Start: 2025-08-21

## 2025-08-21 RX ORDER — INSULIN GLARGINE 100 [IU]/ML
50 INJECTION, SOLUTION SUBCUTANEOUS AT BEDTIME
Qty: 30 ML | Refills: 2 | Status: SHIPPED | OUTPATIENT
Start: 2025-08-21

## 2025-08-21 SDOH — HEALTH STABILITY: PHYSICAL HEALTH: ON AVERAGE, HOW MANY DAYS PER WEEK DO YOU ENGAGE IN MODERATE TO STRENUOUS EXERCISE (LIKE A BRISK WALK)?: 3 DAYS

## (undated) DEVICE — SOL WATER IRRIG 1000ML BOTTLE 07139-09

## (undated) DEVICE — PREP CHLORAPREP 26ML TINTED ORANGE  260815

## (undated) RX ORDER — LIDOCAINE HYDROCHLORIDE 10 MG/ML
INJECTION, SOLUTION EPIDURAL; INFILTRATION; INTRACAUDAL; PERINEURAL
Status: DISPENSED
Start: 2020-08-27

## (undated) RX ORDER — FENTANYL CITRATE 50 UG/ML
INJECTION, SOLUTION INTRAMUSCULAR; INTRAVENOUS
Status: DISPENSED
Start: 2017-12-08

## (undated) RX ORDER — SIMETHICONE 40MG/0.6ML
SUSPENSION, DROPS(FINAL DOSAGE FORM)(ML) ORAL
Status: DISPENSED
Start: 2017-12-08

## (undated) RX ORDER — FENTANYL CITRATE 50 UG/ML
INJECTION, SOLUTION INTRAMUSCULAR; INTRAVENOUS
Status: DISPENSED
Start: 2021-12-10